# Patient Record
Sex: FEMALE | Race: WHITE | NOT HISPANIC OR LATINO
[De-identification: names, ages, dates, MRNs, and addresses within clinical notes are randomized per-mention and may not be internally consistent; named-entity substitution may affect disease eponyms.]

---

## 2014-12-08 RX ORDER — LOSARTAN POTASSIUM 100 MG/1
1 TABLET, FILM COATED ORAL
Qty: 0 | Refills: 0 | COMMUNITY
Start: 2014-12-08

## 2014-12-08 RX ORDER — ATORVASTATIN CALCIUM 80 MG/1
1 TABLET, FILM COATED ORAL
Qty: 0 | Refills: 0 | COMMUNITY
Start: 2014-12-08

## 2017-01-03 ENCOUNTER — CHART COPY (OUTPATIENT)
Age: 72
End: 2017-01-03

## 2017-02-08 ENCOUNTER — APPOINTMENT (OUTPATIENT)
Dept: PHYSICAL MEDICINE AND REHAB | Facility: CLINIC | Age: 72
End: 2017-02-08

## 2017-02-08 VITALS
TEMPERATURE: 98.4 F | HEART RATE: 95 BPM | SYSTOLIC BLOOD PRESSURE: 170 MMHG | DIASTOLIC BLOOD PRESSURE: 85 MMHG | OXYGEN SATURATION: 98 % | BODY MASS INDEX: 18.89 KG/M2 | HEIGHT: 58 IN | WEIGHT: 90 LBS

## 2017-04-12 ENCOUNTER — APPOINTMENT (OUTPATIENT)
Dept: PHYSICAL MEDICINE AND REHAB | Facility: CLINIC | Age: 72
End: 2017-04-12

## 2017-04-12 VITALS
HEART RATE: 97 BPM | SYSTOLIC BLOOD PRESSURE: 150 MMHG | TEMPERATURE: 98.7 F | OXYGEN SATURATION: 93 % | DIASTOLIC BLOOD PRESSURE: 79 MMHG

## 2017-05-02 ENCOUNTER — APPOINTMENT (OUTPATIENT)
Dept: PHYSICAL MEDICINE AND REHAB | Facility: CLINIC | Age: 72
End: 2017-05-02

## 2017-05-02 VITALS
TEMPERATURE: 98 F | OXYGEN SATURATION: 96 % | SYSTOLIC BLOOD PRESSURE: 154 MMHG | HEART RATE: 99 BPM | DIASTOLIC BLOOD PRESSURE: 81 MMHG

## 2017-05-02 DIAGNOSIS — R47.01 APHASIA: ICD-10-CM

## 2017-05-02 DIAGNOSIS — I63.411 CEREBRAL INFARCTION DUE TO EMBOLISM OF RIGHT MIDDLE CEREBRAL ARTERY: ICD-10-CM

## 2017-05-02 RX ORDER — ALENDRONATE SODIUM 70 MG/1
70 TABLET ORAL
Refills: 0 | Status: ACTIVE | COMMUNITY

## 2017-05-09 ENCOUNTER — RX RENEWAL (OUTPATIENT)
Age: 72
End: 2017-05-09

## 2017-05-09 RX ORDER — BUPROPION HYDROCHLORIDE 150 MG/1
150 TABLET, FILM COATED, EXTENDED RELEASE ORAL DAILY
Qty: 90 | Refills: 0 | Status: ACTIVE | COMMUNITY
Start: 2017-02-08 | End: 1900-01-01

## 2017-05-25 ENCOUNTER — RX RENEWAL (OUTPATIENT)
Age: 72
End: 2017-05-25

## 2017-06-07 ENCOUNTER — APPOINTMENT (OUTPATIENT)
Dept: PHYSICAL MEDICINE AND REHAB | Facility: CLINIC | Age: 72
End: 2017-06-07

## 2017-06-07 VITALS
HEIGHT: 58 IN | HEART RATE: 85 BPM | SYSTOLIC BLOOD PRESSURE: 152 MMHG | DIASTOLIC BLOOD PRESSURE: 91 MMHG | BODY MASS INDEX: 18.26 KG/M2 | TEMPERATURE: 85 F | WEIGHT: 87 LBS | OXYGEN SATURATION: 96 %

## 2017-06-12 ENCOUNTER — CHART COPY (OUTPATIENT)
Age: 72
End: 2017-06-12

## 2017-06-13 ENCOUNTER — CHART COPY (OUTPATIENT)
Age: 72
End: 2017-06-13

## 2017-06-13 ENCOUNTER — FORM ENCOUNTER (OUTPATIENT)
Age: 72
End: 2017-06-13

## 2017-06-13 ENCOUNTER — APPOINTMENT (OUTPATIENT)
Dept: PHYSICAL MEDICINE AND REHAB | Facility: CLINIC | Age: 72
End: 2017-06-13

## 2017-06-13 DIAGNOSIS — M25.532 PAIN IN LEFT WRIST: ICD-10-CM

## 2017-06-13 RX ORDER — CEPHALEXIN 750 MG/1
750 CAPSULE ORAL
Qty: 10 | Refills: 0 | Status: ACTIVE | COMMUNITY
Start: 2017-06-13 | End: 1900-01-01

## 2017-06-14 ENCOUNTER — OUTPATIENT (OUTPATIENT)
Dept: OUTPATIENT SERVICES | Facility: HOSPITAL | Age: 72
LOS: 1 days | End: 2017-06-14
Payer: MEDICARE

## 2017-06-14 ENCOUNTER — CHART COPY (OUTPATIENT)
Age: 72
End: 2017-06-14

## 2017-06-14 ENCOUNTER — APPOINTMENT (OUTPATIENT)
Dept: RADIOLOGY | Facility: CLINIC | Age: 72
End: 2017-06-14

## 2017-06-14 VITALS
HEART RATE: 95 BPM | DIASTOLIC BLOOD PRESSURE: 74 MMHG | SYSTOLIC BLOOD PRESSURE: 135 MMHG | TEMPERATURE: 98.3 F | OXYGEN SATURATION: 94 %

## 2017-06-14 DIAGNOSIS — M25.532 PAIN IN LEFT WRIST: ICD-10-CM

## 2017-06-14 PROCEDURE — 73110 X-RAY EXAM OF WRIST: CPT

## 2017-07-12 ENCOUNTER — APPOINTMENT (OUTPATIENT)
Dept: PHYSICAL MEDICINE AND REHAB | Facility: CLINIC | Age: 72
End: 2017-07-12

## 2017-08-07 ENCOUNTER — RX RENEWAL (OUTPATIENT)
Age: 72
End: 2017-08-07

## 2017-10-11 ENCOUNTER — APPOINTMENT (OUTPATIENT)
Dept: PHYSICAL MEDICINE AND REHAB | Facility: CLINIC | Age: 72
End: 2017-10-11
Payer: MEDICARE

## 2017-10-11 VITALS
SYSTOLIC BLOOD PRESSURE: 184 MMHG | DIASTOLIC BLOOD PRESSURE: 74 MMHG | HEART RATE: 85 BPM | TEMPERATURE: 98 F | OXYGEN SATURATION: 96 %

## 2017-10-11 PROCEDURE — 99213 OFFICE O/P EST LOW 20 MIN: CPT | Mod: GC

## 2017-10-17 ENCOUNTER — RX RENEWAL (OUTPATIENT)
Age: 72
End: 2017-10-17

## 2017-11-07 ENCOUNTER — CHART COPY (OUTPATIENT)
Age: 72
End: 2017-11-07

## 2017-11-13 ENCOUNTER — RX RENEWAL (OUTPATIENT)
Age: 72
End: 2017-11-13

## 2017-11-13 RX ORDER — ONABOTULINUMTOXINA 100 [USP'U]/1
100 INJECTION, POWDER, LYOPHILIZED, FOR SOLUTION INTRADERMAL; INTRAMUSCULAR
Qty: 4 | Refills: 0 | Status: ACTIVE | OUTPATIENT
Start: 2017-11-13

## 2017-11-16 ENCOUNTER — CHART COPY (OUTPATIENT)
Age: 72
End: 2017-11-16

## 2017-11-16 RX ORDER — ONABOTULINUMTOXINA 100 [USP'U]/1
100 INJECTION, POWDER, LYOPHILIZED, FOR SOLUTION INTRADERMAL; INTRAMUSCULAR
Qty: 4 | Refills: 0 | Status: ACTIVE | OUTPATIENT
Start: 2017-11-16

## 2017-12-07 ENCOUNTER — APPOINTMENT (OUTPATIENT)
Dept: PHYSICAL MEDICINE AND REHAB | Facility: CLINIC | Age: 72
End: 2017-12-07
Payer: MEDICARE

## 2017-12-07 VITALS
SYSTOLIC BLOOD PRESSURE: 136 MMHG | TEMPERATURE: 98 F | HEART RATE: 89 BPM | DIASTOLIC BLOOD PRESSURE: 76 MMHG | OXYGEN SATURATION: 96 %

## 2017-12-07 VITALS — WEIGHT: 89 LBS | BODY MASS INDEX: 18.6 KG/M2

## 2017-12-07 PROCEDURE — 64643 CHEMODENERV 1 EXTREM 1-4 EA: CPT

## 2017-12-07 PROCEDURE — 95874 GUIDE NERV DESTR NEEDLE EMG: CPT | Mod: LT

## 2017-12-07 PROCEDURE — 64642 CHEMODENERV 1 EXTREMITY 1-4: CPT

## 2017-12-20 ENCOUNTER — APPOINTMENT (OUTPATIENT)
Dept: PHYSICAL MEDICINE AND REHAB | Facility: CLINIC | Age: 72
End: 2017-12-20

## 2018-01-24 ENCOUNTER — APPOINTMENT (OUTPATIENT)
Dept: PHYSICAL MEDICINE AND REHAB | Facility: CLINIC | Age: 73
End: 2018-01-24
Payer: MEDICARE

## 2018-01-24 VITALS — OXYGEN SATURATION: 93 % | HEART RATE: 105 BPM | TEMPERATURE: 97.9 F

## 2018-01-24 PROCEDURE — 99213 OFFICE O/P EST LOW 20 MIN: CPT | Mod: GC

## 2018-01-24 RX ORDER — ONABOTULINUMTOXINA 100 [USP'U]/1
100 INJECTION, POWDER, LYOPHILIZED, FOR SOLUTION INTRADERMAL; INTRAMUSCULAR
Qty: 4 | Refills: 0 | Status: ACTIVE | OUTPATIENT
Start: 2018-01-24

## 2018-01-30 ENCOUNTER — APPOINTMENT (OUTPATIENT)
Dept: RADIOLOGY | Facility: IMAGING CENTER | Age: 73
End: 2018-01-30
Payer: MEDICARE

## 2018-01-30 ENCOUNTER — OUTPATIENT (OUTPATIENT)
Dept: OUTPATIENT SERVICES | Facility: HOSPITAL | Age: 73
LOS: 1 days | End: 2018-01-30
Payer: MEDICARE

## 2018-01-30 DIAGNOSIS — R05 COUGH: ICD-10-CM

## 2018-01-30 DIAGNOSIS — J47.9 BRONCHIECTASIS, UNCOMPLICATED: ICD-10-CM

## 2018-01-30 PROCEDURE — 71046 X-RAY EXAM CHEST 2 VIEWS: CPT | Mod: 26

## 2018-01-30 PROCEDURE — 71046 X-RAY EXAM CHEST 2 VIEWS: CPT

## 2018-02-05 ENCOUNTER — APPOINTMENT (OUTPATIENT)
Dept: PHYSICAL MEDICINE AND REHAB | Facility: CLINIC | Age: 73
End: 2018-02-05

## 2018-03-27 ENCOUNTER — CHART COPY (OUTPATIENT)
Age: 73
End: 2018-03-27

## 2018-03-27 ENCOUNTER — APPOINTMENT (OUTPATIENT)
Dept: PHYSICAL MEDICINE AND REHAB | Facility: CLINIC | Age: 73
End: 2018-03-27
Payer: MEDICARE

## 2018-03-27 VITALS
SYSTOLIC BLOOD PRESSURE: 154 MMHG | OXYGEN SATURATION: 96 % | TEMPERATURE: 98 F | HEART RATE: 98 BPM | DIASTOLIC BLOOD PRESSURE: 79 MMHG

## 2018-03-27 PROCEDURE — 95874 GUIDE NERV DESTR NEEDLE EMG: CPT | Mod: 59

## 2018-03-27 PROCEDURE — 64642 CHEMODENERV 1 EXTREMITY 1-4: CPT

## 2018-03-27 PROCEDURE — 64643 CHEMODENERV 1 EXTREM 1-4 EA: CPT

## 2018-05-02 ENCOUNTER — APPOINTMENT (OUTPATIENT)
Dept: PHYSICAL MEDICINE AND REHAB | Facility: CLINIC | Age: 73
End: 2018-05-02
Payer: MEDICARE

## 2018-05-02 VITALS — DIASTOLIC BLOOD PRESSURE: 74 MMHG | HEART RATE: 96 BPM | SYSTOLIC BLOOD PRESSURE: 128 MMHG | OXYGEN SATURATION: 94 %

## 2018-05-02 PROCEDURE — 99213 OFFICE O/P EST LOW 20 MIN: CPT

## 2018-05-02 RX ORDER — ONABOTULINUMTOXINA 100 [USP'U]/1
100 INJECTION, POWDER, LYOPHILIZED, FOR SOLUTION INTRADERMAL; INTRAMUSCULAR
Qty: 4 | Refills: 0 | Status: ACTIVE | OUTPATIENT
Start: 2018-05-02

## 2018-06-01 ENCOUNTER — APPOINTMENT (OUTPATIENT)
Dept: RADIOLOGY | Facility: IMAGING CENTER | Age: 73
End: 2018-06-01
Payer: MEDICARE

## 2018-06-01 ENCOUNTER — OUTPATIENT (OUTPATIENT)
Dept: OUTPATIENT SERVICES | Facility: HOSPITAL | Age: 73
LOS: 1 days | End: 2018-06-01
Payer: MEDICARE

## 2018-06-01 DIAGNOSIS — R91.1 SOLITARY PULMONARY NODULE: ICD-10-CM

## 2018-06-01 PROCEDURE — 71046 X-RAY EXAM CHEST 2 VIEWS: CPT | Mod: 26

## 2018-06-01 PROCEDURE — 71046 X-RAY EXAM CHEST 2 VIEWS: CPT

## 2018-06-15 DIAGNOSIS — R91.8 OTHER NONSPECIFIC ABNORMAL FINDING OF LUNG FIELD: ICD-10-CM

## 2018-07-03 ENCOUNTER — APPOINTMENT (OUTPATIENT)
Dept: PHYSICAL MEDICINE AND REHAB | Facility: CLINIC | Age: 73
End: 2018-07-03
Payer: MEDICARE

## 2018-07-03 VITALS
SYSTOLIC BLOOD PRESSURE: 132 MMHG | DIASTOLIC BLOOD PRESSURE: 73 MMHG | TEMPERATURE: 98 F | OXYGEN SATURATION: 95 % | HEART RATE: 82 BPM

## 2018-07-03 VITALS — BODY MASS INDEX: 20.69 KG/M2 | WEIGHT: 99 LBS

## 2018-07-03 PROCEDURE — 95874 GUIDE NERV DESTR NEEDLE EMG: CPT

## 2018-07-03 PROCEDURE — 64643 CHEMODENERV 1 EXTREM 1-4 EA: CPT

## 2018-07-03 PROCEDURE — 64642 CHEMODENERV 1 EXTREMITY 1-4: CPT

## 2018-07-03 RX ORDER — IPRATROPIUM BROMIDE 42 UG/1
0.06 SPRAY NASAL
Qty: 15 | Refills: 0 | Status: ACTIVE | COMMUNITY
Start: 2018-03-25

## 2018-07-03 RX ORDER — FLUTICASONE PROPIONATE AND SALMETEROL XINAFOATE 115; 21 UG/1; UG/1
115-21 AEROSOL, METERED RESPIRATORY (INHALATION)
Qty: 36 | Refills: 0 | Status: ACTIVE | COMMUNITY
Start: 2018-01-31

## 2018-07-03 RX ORDER — TIOTROPIUM BROMIDE INHALATION SPRAY 3.12 UG/1
2.5 SPRAY, METERED RESPIRATORY (INHALATION)
Qty: 4 | Refills: 0 | Status: ACTIVE | COMMUNITY
Start: 2017-12-27

## 2018-07-03 RX ORDER — WARFARIN 1 MG/1
1 TABLET ORAL
Qty: 129 | Refills: 0 | Status: ACTIVE | COMMUNITY
Start: 2017-07-18

## 2018-07-03 RX ORDER — LOSARTAN POTASSIUM 25 MG/1
25 TABLET, FILM COATED ORAL
Qty: 90 | Refills: 0 | Status: ACTIVE | COMMUNITY
Start: 2018-04-18

## 2018-08-22 ENCOUNTER — APPOINTMENT (OUTPATIENT)
Dept: PHYSICAL MEDICINE AND REHAB | Facility: CLINIC | Age: 73
End: 2018-08-22
Payer: MEDICARE

## 2018-08-22 VITALS
SYSTOLIC BLOOD PRESSURE: 153 MMHG | HEART RATE: 111 BPM | OXYGEN SATURATION: 97 % | DIASTOLIC BLOOD PRESSURE: 79 MMHG | TEMPERATURE: 98.1 F

## 2018-08-22 PROCEDURE — 99213 OFFICE O/P EST LOW 20 MIN: CPT

## 2018-08-22 RX ORDER — IPRATROPIUM BROMIDE 21 UG/1
0.03 SPRAY NASAL
Qty: 30 | Refills: 0 | Status: COMPLETED | COMMUNITY
Start: 2018-04-04

## 2018-08-22 RX ORDER — ATORVASTATIN CALCIUM 10 MG/1
10 TABLET, FILM COATED ORAL
Qty: 90 | Refills: 0 | Status: ACTIVE | COMMUNITY
Start: 2018-04-10

## 2018-08-22 RX ORDER — ONABOTULINUMTOXINA 100 [USP'U]/1
100 INJECTION, POWDER, LYOPHILIZED, FOR SOLUTION INTRADERMAL; INTRAMUSCULAR
Qty: 4 | Refills: 0 | Status: ACTIVE | OUTPATIENT
Start: 2018-08-22

## 2018-10-02 ENCOUNTER — APPOINTMENT (OUTPATIENT)
Dept: PHYSICAL MEDICINE AND REHAB | Facility: CLINIC | Age: 73
End: 2018-10-02
Payer: MEDICARE

## 2018-10-02 VITALS — WEIGHT: 98.31 LBS | DIASTOLIC BLOOD PRESSURE: 79 MMHG | BODY MASS INDEX: 20.55 KG/M2 | SYSTOLIC BLOOD PRESSURE: 146 MMHG

## 2018-10-02 PROCEDURE — 99213 OFFICE O/P EST LOW 20 MIN: CPT

## 2018-10-10 ENCOUNTER — INPATIENT (INPATIENT)
Facility: HOSPITAL | Age: 73
LOS: 20 days | Discharge: HOME | DRG: 56 | End: 2018-10-31
Attending: INTERNAL MEDICINE | Admitting: SPECIALIST
Payer: MEDICARE

## 2018-10-10 VITALS
HEART RATE: 116 BPM | SYSTOLIC BLOOD PRESSURE: 169 MMHG | OXYGEN SATURATION: 96 % | RESPIRATION RATE: 18 BRPM | TEMPERATURE: 97 F | HEIGHT: 59 IN | WEIGHT: 95.02 LBS | DIASTOLIC BLOOD PRESSURE: 95 MMHG

## 2018-10-10 DIAGNOSIS — R56.9 UNSPECIFIED CONVULSIONS: ICD-10-CM

## 2018-10-10 LAB
ALBUMIN SERPL ELPH-MCNC: 3.9 G/DL — SIGNIFICANT CHANGE UP (ref 3.3–5)
ALBUMIN SERPL ELPH-MCNC: 4 G/DL — SIGNIFICANT CHANGE UP (ref 3.3–5)
ALP SERPL-CCNC: 106 U/L — SIGNIFICANT CHANGE UP (ref 40–120)
ALP SERPL-CCNC: 116 U/L — SIGNIFICANT CHANGE UP (ref 40–120)
ALT FLD-CCNC: 16 U/L — SIGNIFICANT CHANGE UP (ref 10–45)
ALT FLD-CCNC: 16 U/L — SIGNIFICANT CHANGE UP (ref 10–45)
ANION GAP SERPL CALC-SCNC: 13 MMOL/L — SIGNIFICANT CHANGE UP (ref 5–17)
ANION GAP SERPL CALC-SCNC: 8 MMOL/L — SIGNIFICANT CHANGE UP (ref 5–17)
APPEARANCE UR: CLEAR — SIGNIFICANT CHANGE UP
APTT BLD: 29.9 SEC — SIGNIFICANT CHANGE UP (ref 27.5–37.4)
APTT BLD: 34.5 SEC — SIGNIFICANT CHANGE UP (ref 27.5–37.4)
AST SERPL-CCNC: 21 U/L — SIGNIFICANT CHANGE UP (ref 10–40)
AST SERPL-CCNC: 25 U/L — SIGNIFICANT CHANGE UP (ref 10–40)
BACTERIA # UR AUTO: NEGATIVE — SIGNIFICANT CHANGE UP
BASOPHILS # BLD AUTO: 0 K/UL — SIGNIFICANT CHANGE UP (ref 0–0.2)
BASOPHILS NFR BLD AUTO: 0.5 % — SIGNIFICANT CHANGE UP (ref 0–2)
BILIRUB SERPL-MCNC: 0.4 MG/DL — SIGNIFICANT CHANGE UP (ref 0.2–1.2)
BILIRUB SERPL-MCNC: 0.7 MG/DL — SIGNIFICANT CHANGE UP (ref 0.2–1.2)
BILIRUB UR-MCNC: NEGATIVE — SIGNIFICANT CHANGE UP
BUN SERPL-MCNC: 10 MG/DL — SIGNIFICANT CHANGE UP (ref 7–23)
BUN SERPL-MCNC: 12 MG/DL — SIGNIFICANT CHANGE UP (ref 7–23)
CALCIUM SERPL-MCNC: 8.8 MG/DL — SIGNIFICANT CHANGE UP (ref 8.4–10.5)
CALCIUM SERPL-MCNC: 9.3 MG/DL — SIGNIFICANT CHANGE UP (ref 8.4–10.5)
CHLORIDE SERPL-SCNC: 102 MMOL/L — SIGNIFICANT CHANGE UP (ref 96–108)
CHLORIDE SERPL-SCNC: 105 MMOL/L — SIGNIFICANT CHANGE UP (ref 96–108)
CK MB CFR SERPL CALC: 3.8 NG/ML — SIGNIFICANT CHANGE UP (ref 0–3.8)
CK SERPL-CCNC: 435 U/L — HIGH (ref 25–170)
CO2 SERPL-SCNC: 21 MMOL/L — LOW (ref 22–31)
CO2 SERPL-SCNC: 28 MMOL/L — SIGNIFICANT CHANGE UP (ref 22–31)
COLOR SPEC: COLORLESS — SIGNIFICANT CHANGE UP
CREAT SERPL-MCNC: 0.59 MG/DL — SIGNIFICANT CHANGE UP (ref 0.5–1.3)
CREAT SERPL-MCNC: 0.8 MG/DL — SIGNIFICANT CHANGE UP (ref 0.5–1.3)
DIFF PNL FLD: ABNORMAL
EOSINOPHIL # BLD AUTO: 0.2 K/UL — SIGNIFICANT CHANGE UP (ref 0–0.5)
EOSINOPHIL NFR BLD AUTO: 3.6 % — SIGNIFICANT CHANGE UP (ref 0–6)
EPI CELLS # UR: 0 /HPF — SIGNIFICANT CHANGE UP
ETHANOL SERPL-MCNC: SIGNIFICANT CHANGE UP MG/DL (ref 0–10)
GAS PNL BLDA: SIGNIFICANT CHANGE UP
GLUCOSE BLDC GLUCOMTR-MCNC: 138 MG/DL — HIGH (ref 70–99)
GLUCOSE SERPL-MCNC: 100 MG/DL — HIGH (ref 70–99)
GLUCOSE SERPL-MCNC: 145 MG/DL — HIGH (ref 70–99)
GLUCOSE UR QL: ABNORMAL
HBA1C BLD-MCNC: 5.4 % — SIGNIFICANT CHANGE UP (ref 4–5.6)
HCT VFR BLD CALC: 39.9 % — SIGNIFICANT CHANGE UP (ref 34.5–45)
HCT VFR BLD CALC: 41.6 % — SIGNIFICANT CHANGE UP (ref 34.5–45)
HGB BLD-MCNC: 13.8 G/DL — SIGNIFICANT CHANGE UP (ref 11.5–15.5)
HGB BLD-MCNC: 14.3 G/DL — SIGNIFICANT CHANGE UP (ref 11.5–15.5)
HYALINE CASTS # UR AUTO: 0 /LPF — SIGNIFICANT CHANGE UP (ref 0–2)
INR BLD: 1.11 RATIO — SIGNIFICANT CHANGE UP (ref 0.88–1.16)
INR BLD: 2.63 RATIO — HIGH (ref 0.88–1.16)
KETONES UR-MCNC: NEGATIVE — SIGNIFICANT CHANGE UP
LEUKOCYTE ESTERASE UR-ACNC: NEGATIVE — SIGNIFICANT CHANGE UP
LYMPHOCYTES # BLD AUTO: 1.8 K/UL — SIGNIFICANT CHANGE UP (ref 1–3.3)
LYMPHOCYTES # BLD AUTO: 27.1 % — SIGNIFICANT CHANGE UP (ref 13–44)
MAGNESIUM SERPL-MCNC: 1.7 MG/DL — SIGNIFICANT CHANGE UP (ref 1.6–2.6)
MCHC RBC-ENTMCNC: 32 PG — SIGNIFICANT CHANGE UP (ref 27–34)
MCHC RBC-ENTMCNC: 32.5 PG — SIGNIFICANT CHANGE UP (ref 27–34)
MCHC RBC-ENTMCNC: 34.3 GM/DL — SIGNIFICANT CHANGE UP (ref 32–36)
MCHC RBC-ENTMCNC: 34.5 GM/DL — SIGNIFICANT CHANGE UP (ref 32–36)
MCV RBC AUTO: 93.2 FL — SIGNIFICANT CHANGE UP (ref 80–100)
MCV RBC AUTO: 94.3 FL — SIGNIFICANT CHANGE UP (ref 80–100)
MONOCYTES # BLD AUTO: 0.6 K/UL — SIGNIFICANT CHANGE UP (ref 0–0.9)
MONOCYTES NFR BLD AUTO: 8.8 % — SIGNIFICANT CHANGE UP (ref 2–14)
NEUTROPHILS # BLD AUTO: 4 K/UL — SIGNIFICANT CHANGE UP (ref 1.8–7.4)
NEUTROPHILS NFR BLD AUTO: 59.9 % — SIGNIFICANT CHANGE UP (ref 43–77)
NITRITE UR-MCNC: NEGATIVE — SIGNIFICANT CHANGE UP
PCP SPEC-MCNC: SIGNIFICANT CHANGE UP
PH UR: 7.5 — SIGNIFICANT CHANGE UP (ref 5–8)
PHOSPHATE SERPL-MCNC: 1.5 MG/DL — LOW (ref 2.5–4.5)
PLATELET # BLD AUTO: 267 K/UL — SIGNIFICANT CHANGE UP (ref 150–400)
PLATELET # BLD AUTO: 274 K/UL — SIGNIFICANT CHANGE UP (ref 150–400)
POTASSIUM SERPL-MCNC: 3.7 MMOL/L — SIGNIFICANT CHANGE UP (ref 3.5–5.3)
POTASSIUM SERPL-MCNC: 4 MMOL/L — SIGNIFICANT CHANGE UP (ref 3.5–5.3)
POTASSIUM SERPL-SCNC: 3.7 MMOL/L — SIGNIFICANT CHANGE UP (ref 3.5–5.3)
POTASSIUM SERPL-SCNC: 4 MMOL/L — SIGNIFICANT CHANGE UP (ref 3.5–5.3)
PROT SERPL-MCNC: 7.2 G/DL — SIGNIFICANT CHANGE UP (ref 6–8.3)
PROT SERPL-MCNC: 7.3 G/DL — SIGNIFICANT CHANGE UP (ref 6–8.3)
PROT UR-MCNC: SIGNIFICANT CHANGE UP
PROTHROM AB SERPL-ACNC: 12.1 SEC — SIGNIFICANT CHANGE UP (ref 9.8–12.7)
PROTHROM AB SERPL-ACNC: 29.2 SEC — HIGH (ref 9.8–12.7)
RBC # BLD: 4.23 M/UL — SIGNIFICANT CHANGE UP (ref 3.8–5.2)
RBC # BLD: 4.47 M/UL — SIGNIFICANT CHANGE UP (ref 3.8–5.2)
RBC # FLD: 11.2 % — SIGNIFICANT CHANGE UP (ref 10.3–14.5)
RBC # FLD: 11.3 % — SIGNIFICANT CHANGE UP (ref 10.3–14.5)
RBC CASTS # UR COMP ASSIST: 2 /HPF — SIGNIFICANT CHANGE UP (ref 0–4)
SALICYLATES SERPL-MCNC: <2 MG/DL — LOW (ref 15–30)
SODIUM SERPL-SCNC: 136 MMOL/L — SIGNIFICANT CHANGE UP (ref 135–145)
SODIUM SERPL-SCNC: 141 MMOL/L — SIGNIFICANT CHANGE UP (ref 135–145)
SP GR SPEC: 1.02 — SIGNIFICANT CHANGE UP (ref 1.01–1.02)
TROPONIN T, HIGH SENSITIVITY RESULT: 16 NG/L — SIGNIFICANT CHANGE UP (ref 0–51)
UROBILINOGEN FLD QL: NEGATIVE — SIGNIFICANT CHANGE UP
WBC # BLD: 18.6 K/UL — HIGH (ref 3.8–10.5)
WBC # BLD: 6.6 K/UL — SIGNIFICANT CHANGE UP (ref 3.8–10.5)
WBC # FLD AUTO: 18.6 K/UL — HIGH (ref 3.8–10.5)
WBC # FLD AUTO: 6.6 K/UL — SIGNIFICANT CHANGE UP (ref 3.8–10.5)
WBC UR QL: 0 /HPF — SIGNIFICANT CHANGE UP (ref 0–5)

## 2018-10-10 PROCEDURE — 99291 CRITICAL CARE FIRST HOUR: CPT

## 2018-10-10 PROCEDURE — 72125 CT NECK SPINE W/O DYE: CPT | Mod: 26

## 2018-10-10 PROCEDURE — 93010 ELECTROCARDIOGRAM REPORT: CPT

## 2018-10-10 PROCEDURE — 71260 CT THORAX DX C+: CPT | Mod: 26

## 2018-10-10 PROCEDURE — 31500 INSERT EMERGENCY AIRWAY: CPT

## 2018-10-10 PROCEDURE — 70486 CT MAXILLOFACIAL W/O DYE: CPT | Mod: 26

## 2018-10-10 PROCEDURE — 70450 CT HEAD/BRAIN W/O DYE: CPT | Mod: 26

## 2018-10-10 PROCEDURE — 71045 X-RAY EXAM CHEST 1 VIEW: CPT | Mod: 26

## 2018-10-10 PROCEDURE — 95951: CPT | Mod: 26

## 2018-10-10 PROCEDURE — 95819 EEG AWAKE AND ASLEEP: CPT | Mod: 26

## 2018-10-10 PROCEDURE — 74177 CT ABD & PELVIS W/CONTRAST: CPT | Mod: 26

## 2018-10-10 PROCEDURE — 99291 CRITICAL CARE FIRST HOUR: CPT | Mod: 25

## 2018-10-10 RX ORDER — MONTELUKAST 4 MG/1
1 TABLET, CHEWABLE ORAL
Qty: 0 | Refills: 0 | COMMUNITY

## 2018-10-10 RX ORDER — BUPROPION HYDROCHLORIDE 150 MG/1
150 TABLET, EXTENDED RELEASE ORAL
Qty: 0 | Refills: 0 | Status: DISCONTINUED | OUTPATIENT
Start: 2018-10-10 | End: 2018-10-11

## 2018-10-10 RX ORDER — PROPOFOL 10 MG/ML
10 INJECTION, EMULSION INTRAVENOUS
Qty: 500 | Refills: 0 | Status: DISCONTINUED | OUTPATIENT
Start: 2018-10-10 | End: 2018-10-11

## 2018-10-10 RX ORDER — SODIUM CHLORIDE 9 MG/ML
1000 INJECTION INTRAMUSCULAR; INTRAVENOUS; SUBCUTANEOUS
Qty: 0 | Refills: 0 | Status: DISCONTINUED | OUTPATIENT
Start: 2018-10-10 | End: 2018-10-11

## 2018-10-10 RX ORDER — FENTANYL CITRATE 50 UG/ML
50 INJECTION INTRAVENOUS ONCE
Qty: 0 | Refills: 0 | Status: DISCONTINUED | OUTPATIENT
Start: 2018-10-10 | End: 2018-10-10

## 2018-10-10 RX ORDER — ETOMIDATE 2 MG/ML
20 INJECTION INTRAVENOUS ONCE
Qty: 0 | Refills: 0 | Status: COMPLETED | OUTPATIENT
Start: 2018-10-10 | End: 2018-10-10

## 2018-10-10 RX ORDER — DEXTROSE 50 % IN WATER 50 %
25 SYRINGE (ML) INTRAVENOUS ONCE
Qty: 0 | Refills: 0 | Status: DISCONTINUED | OUTPATIENT
Start: 2018-10-10 | End: 2018-10-31

## 2018-10-10 RX ORDER — PHYTONADIONE (VIT K1) 5 MG
10 TABLET ORAL ONCE
Qty: 0 | Refills: 0 | Status: DISCONTINUED | OUTPATIENT
Start: 2018-10-10 | End: 2018-10-10

## 2018-10-10 RX ORDER — MAGNESIUM SULFATE 500 MG/ML
1 VIAL (ML) INJECTION ONCE
Qty: 0 | Refills: 0 | Status: COMPLETED | OUTPATIENT
Start: 2018-10-10 | End: 2018-10-10

## 2018-10-10 RX ORDER — PHENYLEPHRINE HYDROCHLORIDE 10 MG/ML
0.4 INJECTION INTRAVENOUS
Qty: 160 | Refills: 0 | Status: DISCONTINUED | OUTPATIENT
Start: 2018-10-10 | End: 2018-10-12

## 2018-10-10 RX ORDER — DEXMEDETOMIDINE HYDROCHLORIDE IN 0.9% SODIUM CHLORIDE 4 UG/ML
0.2 INJECTION INTRAVENOUS
Qty: 200 | Refills: 0 | Status: DISCONTINUED | OUTPATIENT
Start: 2018-10-10 | End: 2018-10-12

## 2018-10-10 RX ORDER — SODIUM CHLORIDE 9 MG/ML
1000 INJECTION, SOLUTION INTRAVENOUS
Qty: 0 | Refills: 0 | Status: DISCONTINUED | OUTPATIENT
Start: 2018-10-10 | End: 2018-10-11

## 2018-10-10 RX ORDER — LEVETIRACETAM 250 MG/1
1000 TABLET, FILM COATED ORAL EVERY 12 HOURS
Qty: 0 | Refills: 0 | Status: DISCONTINUED | OUTPATIENT
Start: 2018-10-10 | End: 2018-10-10

## 2018-10-10 RX ORDER — POTASSIUM PHOSPHATE, MONOBASIC POTASSIUM PHOSPHATE, DIBASIC 236; 224 MG/ML; MG/ML
30 INJECTION, SOLUTION INTRAVENOUS ONCE
Qty: 0 | Refills: 0 | Status: COMPLETED | OUTPATIENT
Start: 2018-10-10 | End: 2018-10-10

## 2018-10-10 RX ORDER — INSULIN LISPRO 100/ML
VIAL (ML) SUBCUTANEOUS EVERY 6 HOURS
Qty: 0 | Refills: 0 | Status: DISCONTINUED | OUTPATIENT
Start: 2018-10-10 | End: 2018-10-12

## 2018-10-10 RX ORDER — ACETAMINOPHEN 500 MG
1000 TABLET ORAL ONCE
Qty: 0 | Refills: 0 | Status: DISCONTINUED | OUTPATIENT
Start: 2018-10-10 | End: 2018-10-10

## 2018-10-10 RX ORDER — DEXTROSE 50 % IN WATER 50 %
12.5 SYRINGE (ML) INTRAVENOUS ONCE
Qty: 0 | Refills: 0 | Status: DISCONTINUED | OUTPATIENT
Start: 2018-10-10 | End: 2018-10-31

## 2018-10-10 RX ORDER — MAGNESIUM SULFATE 500 MG/ML
2 VIAL (ML) INJECTION ONCE
Qty: 0 | Refills: 0 | Status: COMPLETED | OUTPATIENT
Start: 2018-10-10 | End: 2018-10-10

## 2018-10-10 RX ORDER — ROCURONIUM BROMIDE 10 MG/ML
100 VIAL (ML) INTRAVENOUS ONCE
Qty: 0 | Refills: 0 | Status: DISCONTINUED | OUTPATIENT
Start: 2018-10-10 | End: 2018-10-10

## 2018-10-10 RX ORDER — PROPOFOL 10 MG/ML
10 INJECTION, EMULSION INTRAVENOUS
Qty: 500 | Refills: 0 | Status: DISCONTINUED | OUTPATIENT
Start: 2018-10-10 | End: 2018-10-10

## 2018-10-10 RX ORDER — DEXTROSE 50 % IN WATER 50 %
15 SYRINGE (ML) INTRAVENOUS ONCE
Qty: 0 | Refills: 0 | Status: DISCONTINUED | OUTPATIENT
Start: 2018-10-10 | End: 2018-10-31

## 2018-10-10 RX ORDER — GLUCAGON INJECTION, SOLUTION 0.5 MG/.1ML
1 INJECTION, SOLUTION SUBCUTANEOUS ONCE
Qty: 0 | Refills: 0 | Status: DISCONTINUED | OUTPATIENT
Start: 2018-10-10 | End: 2018-10-31

## 2018-10-10 RX ORDER — ALENDRONATE SODIUM 70 MG/1
1 TABLET ORAL
Qty: 0 | Refills: 0 | COMMUNITY

## 2018-10-10 RX ORDER — NICARDIPINE HYDROCHLORIDE 30 MG/1
5 CAPSULE, EXTENDED RELEASE ORAL
Qty: 40 | Refills: 0 | Status: DISCONTINUED | OUTPATIENT
Start: 2018-10-10 | End: 2018-10-10

## 2018-10-10 RX ORDER — PROTHROMBIN COMPLEX CONCENTRATE (HUMAN) 25.5; 16.5; 24; 22; 22; 26 [IU]/ML; [IU]/ML; [IU]/ML; [IU]/ML; [IU]/ML; [IU]/ML
1500 POWDER, FOR SOLUTION INTRAVENOUS ONCE
Qty: 0 | Refills: 0 | Status: DISCONTINUED | OUTPATIENT
Start: 2018-10-10 | End: 2018-10-10

## 2018-10-10 RX ORDER — LEVETIRACETAM 250 MG/1
500 TABLET, FILM COATED ORAL EVERY 12 HOURS
Qty: 0 | Refills: 0 | Status: DISCONTINUED | OUTPATIENT
Start: 2018-10-10 | End: 2018-10-15

## 2018-10-10 RX ORDER — BUPROPION HYDROCHLORIDE 150 MG/1
1 TABLET, EXTENDED RELEASE ORAL
Qty: 0 | Refills: 0 | COMMUNITY

## 2018-10-10 RX ORDER — IPRATROPIUM/ALBUTEROL SULFATE 18-103MCG
3 AEROSOL WITH ADAPTER (GRAM) INHALATION EVERY 6 HOURS
Qty: 0 | Refills: 0 | Status: DISCONTINUED | OUTPATIENT
Start: 2018-10-10 | End: 2018-10-13

## 2018-10-10 RX ORDER — SODIUM CHLORIDE 9 MG/ML
1000 INJECTION, SOLUTION INTRAVENOUS
Qty: 0 | Refills: 0 | Status: DISCONTINUED | OUTPATIENT
Start: 2018-10-10 | End: 2018-10-31

## 2018-10-10 RX ADMIN — SODIUM CHLORIDE 75 MILLILITER(S): 9 INJECTION, SOLUTION INTRAVENOUS at 22:19

## 2018-10-10 RX ADMIN — Medication 50 GRAM(S): at 17:46

## 2018-10-10 RX ADMIN — FENTANYL CITRATE 50 MICROGRAM(S): 50 INJECTION INTRAVENOUS at 15:08

## 2018-10-10 RX ADMIN — SODIUM CHLORIDE 75 MILLILITER(S): 9 INJECTION INTRAMUSCULAR; INTRAVENOUS; SUBCUTANEOUS at 17:07

## 2018-10-10 RX ADMIN — PROPOFOL 2.59 MICROGRAM(S)/KG/MIN: 10 INJECTION, EMULSION INTRAVENOUS at 16:03

## 2018-10-10 RX ADMIN — NICARDIPINE HYDROCHLORIDE 25 MG/HR: 30 CAPSULE, EXTENDED RELEASE ORAL at 17:06

## 2018-10-10 RX ADMIN — POTASSIUM PHOSPHATE, MONOBASIC POTASSIUM PHOSPHATE, DIBASIC 83.33 MILLIMOLE(S): 236; 224 INJECTION, SOLUTION INTRAVENOUS at 21:06

## 2018-10-10 RX ADMIN — FENTANYL CITRATE 50 MICROGRAM(S): 50 INJECTION INTRAVENOUS at 15:00

## 2018-10-10 RX ADMIN — LEVETIRACETAM 400 MILLIGRAM(S): 250 TABLET, FILM COATED ORAL at 16:03

## 2018-10-10 RX ADMIN — ETOMIDATE 20 MILLIGRAM(S): 2 INJECTION INTRAVENOUS at 14:53

## 2018-10-10 RX ADMIN — PHENYLEPHRINE HYDROCHLORIDE 3.21 MICROGRAM(S)/KG/MIN: 10 INJECTION INTRAVENOUS at 21:18

## 2018-10-10 RX ADMIN — DEXMEDETOMIDINE HYDROCHLORIDE IN 0.9% SODIUM CHLORIDE 2.14 MICROGRAM(S)/KG/HR: 4 INJECTION INTRAVENOUS at 21:18

## 2018-10-10 RX ADMIN — Medication 2 MILLIGRAM(S): at 19:40

## 2018-10-10 RX ADMIN — PROPOFOL 2.59 MICROGRAM(S)/KG/MIN: 10 INJECTION, EMULSION INTRAVENOUS at 17:07

## 2018-10-10 NOTE — ED ADULT NURSE NOTE - OBJECTIVE STATEMENT
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 Pt has bruising to the left eye and was found down on her left side. + a/c. Pt denies any CP or SOB no N/V/D no cough fever or chills. Pts  and aide at the bedside.

## 2018-10-10 NOTE — CHART NOTE - NSCHARTNOTEFT_GEN_A_CORE
MICU Accept Note    CHIEF COMPLAINT:     HPI / INTERVAL HISTORY:  73t old female PMHx of Afib on coumadin, left spastic hemiparesis presented after a fall, unwitnessed.  Patient was reportedly found by family on the ground.  She was reportedly shaking and then confused, but then returned to baseline per her family. She was transported to the ED. Initial CT head / c-spine which were negative. Approximately three hours later, the patient seized again and was intubated for airway protection and admitted to the SICU.    Patient's vitals were notable for tachycardia and hypertension to the SBP 230s started on Nicardipine ggt. Labs significant for leukocytosis to the 18s with normal lactate of 1.2. Patient started on Keppra mitch neurology. MICU consulted for possible seizures     PAST MEDICAL & SURGICAL HISTORY:  DVT (deep venous thrombosis)  HLD (hyperlipidemia)  COPD (chronic obstructive pulmonary disease)  No significant past surgical history      FAMILY HISTORY:  No pertinent family history      SOCIAL HISTORY:  Smoking: [  ] Never Smoked  [  ] Former Smoker (# packs x # years)  [  ] Current Smoker (# packs x # years)  Substance Use:   EtOH Use:   Marital Status: [  ] Single  [  ]   [  ]   [  ]   Sexual History:   Occupation:  Recent Travel:  Country of Birth:   Advance Directives:     HOME MEDICATIONS:      Allergies    No Known Allergies    Intolerances          REVIEW OF SYSTEMS:  [X ] Unable to assess ROS because intubated and sedated     OBJECTIVE:  ICU Vital Signs Last 24 Hrs  T(C): 36.3 (10 Oct 2018 15:30), Max: 36.9 (10 Oct 2018 12:45)  T(F): 97.3 (10 Oct 2018 15:30), Max: 98.5 (10 Oct 2018 12:45)  HR: 123 (10 Oct 2018 17:30) (78 - 140)  BP: 150/71 (10 Oct 2018 17:30) (143/77 - 226/123)  BP(mean): 101 (10 Oct 2018 17:30) (101 - 162)  ABP: --  ABP(mean): --  RR: 14 (10 Oct 2018 17:30) (14 - 18)  SpO2: 100% (10 Oct 2018 17:30) (95% - 100%)    Mode: AC/ CMV (Assist Control/ Continuous Mandatory Ventilation), RR (machine): 14, TV (machine): 450, FiO2: 100, PEEP: 5, ITime: 1, MAP: 8, PIP: 19    10-10 @ 07:01  -  10-10 @ 17:49  --------------------------------------------------------  IN: 300.6 mL / OUT: 1000 mL / NET: -699.4 mL      CAPILLARY BLOOD GLUCOSE      POCT Blood Glucose.: 100 mg/dL (10 Oct 2018 14:28)      PHYSICAL EXAM:  GENERAL: intubated and sedated.  HEAD:  hematoma over left orbit  EYES: EOMI, PERRLA, conjunctiva and sclera clear  NECK: Supple, No JVD  CHEST/LUNG: Clear to auscultation bilaterally; No wheeze  HEART: Regular rate and rhythm; No murmurs, rubs, or gallops  ABDOMEN: Soft, Nontender, Nondistended; Bowel sounds present  EXTREMITIES:  2+ Peripheral Pulses, No clubbing, cyanosis, or edema  PSYCH: AAOx3  NEUROLOGY: intubated and sedated. left pupil constricted and nonreactive   SKIN: No rashes or lesions      LINES:     HOSPITAL MEDICATIONS:  MEDICATIONS  (STANDING):  acetaminophen  IVPB .. 1000 milliGRAM(s) IV Intermittent once  dextrose 5%. 1000 milliLiter(s) (50 mL/Hr) IV Continuous <Continuous>  dextrose 50% Injectable 12.5 Gram(s) IV Push once  dextrose 50% Injectable 25 Gram(s) IV Push once  dextrose 50% Injectable 25 Gram(s) IV Push once  insulin lispro (HumaLOG) corrective regimen sliding scale   SubCutaneous every 6 hours  levETIRAcetam  IVPB 500 milliGRAM(s) IV Intermittent every 12 hours  multivitamin/thiamine/folic acid in sodium chloride 0.9% 1000 milliLiter(s) (75 mL/Hr) IV Continuous <Continuous>  niCARdipine Infusion 5 mG/Hr (25 mL/Hr) IV Continuous <Continuous>  phytonadione  IVPB 10 milliGRAM(s) IV Intermittent once  potassium phosphate IVPB 30 milliMole(s) IV Intermittent once  propofol Infusion 10 MICROgram(s)/kG/Min (2.586 mL/Hr) IV Continuous <Continuous>  prothrombin complex concentrate IVPB (KCENTRA) 1500 International Unit(s) IV Intermittent once  sodium chloride 0.9%. 1000 milliLiter(s) (75 mL/Hr) IV Continuous <Continuous>    MEDICATIONS  (PRN):  dextrose 40% Gel 15 Gram(s) Oral once PRN Blood Glucose LESS THAN 70 milliGRAM(s)/deciLiter  glucagon  Injectable 1 milliGRAM(s) IntraMuscular once PRN Glucose <70 milliGRAM(s)/deciLiter      LABS:                        14.3   18.6  )-----------( 267      ( 10 Oct 2018 16:27 )             41.6     Hgb Trend: 14.3<--, 13.8<--  10-10    136  |  102  |  10  ----------------------------<  145<H>  3.7   |  21<L>  |  0.59    Ca    8.8      10 Oct 2018 16:27  Phos  1.5     10-10  Mg     1.7     10-10    TPro  7.2  /  Alb  4.0  /  TBili  0.7  /  DBili  x   /  AST  25  /  ALT  16  /  AlkPhos  116  10-10    Creatinine Trend: 0.59<--, 0.80<--  PT/INR - ( 10 Oct 2018 16:27 )   PT: 12.1 sec;   INR: 1.11 ratio         PTT - ( 10 Oct 2018 16:27 )  PTT:29.9 sec  Urinalysis Basic - ( 10 Oct 2018 16:27 )    Color: Colorless / Appearance: Clear / S.020 / pH: x  Gluc: x / Ketone: Negative  / Bili: Negative / Urobili: Negative   Blood: x / Protein: Trace / Nitrite: Negative   Leuk Esterase: Negative / RBC: 2 /hpf / WBC 0 /hpf   Sq Epi: x / Non Sq Epi: 0 /hpf / Bacteria: Negative      Arterial Blood Gas:  10-10 @ 16:28  7.42/38/212/24/100/.6  ABG lactate: --        MICROBIOLOGY:     RADIOLOGY & ADDITIONAL TESTS:        A/P:  73F Hx Afib on coumadin, CVA with residual right weakness presented after unwitnessed fall and seizure activity s/p intubation.     Neuro:  Seizure:  - CT head x 2 did not show any acute changes  - started on Keppra for seizure per neurology, rene recs  - EEG ordered  - Will check urine and blood tox, alcohol and salicylate level  - Propofol for sedation  - Tylenol IV     CV:  HTN:  - Patient currently hypertensive and has history of hypertension, SBP was 230s  - on Nicardipine ggt, with BP goal of 180/100 for now  - Will order TSH level and lipid profile  - trend trop    Respiratory:  - Intubated for airway protection in active seizure  - Continue mechanical ventilation  - Will trend ABG's    GI:  - NPO  - Will place OG tube  - Will start protonix after 48 hours  - CT abd was benign for acute trauma    Renal:  - Stict I's O's  - C/w soria  - Electrolyte WNL, will replete prn    Heme:  - Patient received K-centra and Vitamin K  - INR 2.5 -> 1.1    ID:  - Leukocytosis to 18.6, lactate 1.2  - BCx sent    Endo:  - uses insulin at home  - start SSI with q6hr finger sticks  - check A1C MICU Accept Note    CHIEF COMPLAINT:     HPI / INTERVAL HISTORY:  73t old female PMHx of Afib on coumadin, left spastic hemiparesis presented after a fall, unwitnessed.  Patient was reportedly found by family on the ground.  She was reportedly shaking and then confused, but then returned to baseline per her family. She was transported to the ED. Initial CT head / c-spine which were negative. Approximately three hours later, the patient seized again and was intubated for airway protection and admitted to the SICU.    Patient's vitals were notable for tachycardia and hypertension to the SBP 230s started on Nicardipine ggt. Labs significant for leukocytosis to the 18s with normal lactate of 1.2. Patient started on Keppra mitch neurology. MICU consulted for possible seizures     PAST MEDICAL & SURGICAL HISTORY:  DVT (deep venous thrombosis)  HLD (hyperlipidemia)  COPD (chronic obstructive pulmonary disease)  No significant past surgical history      FAMILY HISTORY:  No pertinent family history      SOCIAL HISTORY:  Smoking: [  ] Never Smoked  [  ] Former Smoker (# packs x # years)  [  ] Current Smoker (# packs x # years)  Substance Use:   EtOH Use:   Marital Status: [  ] Single  [  ]   [  ]   [  ]   Sexual History:   Occupation:  Recent Travel:  Country of Birth:   Advance Directives:     HOME MEDICATIONS:      Allergies    No Known Allergies    Intolerances          REVIEW OF SYSTEMS:  [X ] Unable to assess ROS because intubated and sedated     OBJECTIVE:  ICU Vital Signs Last 24 Hrs  T(C): 36.3 (10 Oct 2018 15:30), Max: 36.9 (10 Oct 2018 12:45)  T(F): 97.3 (10 Oct 2018 15:30), Max: 98.5 (10 Oct 2018 12:45)  HR: 123 (10 Oct 2018 17:30) (78 - 140)  BP: 150/71 (10 Oct 2018 17:30) (143/77 - 226/123)  BP(mean): 101 (10 Oct 2018 17:30) (101 - 162)  ABP: --  ABP(mean): --  RR: 14 (10 Oct 2018 17:30) (14 - 18)  SpO2: 100% (10 Oct 2018 17:30) (95% - 100%)    Mode: AC/ CMV (Assist Control/ Continuous Mandatory Ventilation), RR (machine): 14, TV (machine): 450, FiO2: 100, PEEP: 5, ITime: 1, MAP: 8, PIP: 19    10-10 @ 07:01  -  10-10 @ 17:49  --------------------------------------------------------  IN: 300.6 mL / OUT: 1000 mL / NET: -699.4 mL      CAPILLARY BLOOD GLUCOSE      POCT Blood Glucose.: 100 mg/dL (10 Oct 2018 14:28)      PHYSICAL EXAM:  GENERAL: intubated and sedated.  HEAD:  hematoma over left orbit  EYES: EOMI, PERRLA, conjunctiva and sclera clear  NECK: Supple, No JVD  CHEST/LUNG: Clear to auscultation bilaterally; No wheeze  HEART: Tachycardic. Irregularly irregular rhythm; No murmurs, rubs, or gallops  ABDOMEN: Soft, Nontender, Nondistended; Bowel sounds present  EXTREMITIES:  2+ Peripheral Pulses, No clubbing, cyanosis, or edema  NEUROLOGY: intubated and sedated. left pupil constricted and nonreactive   SKIN: No rashes or lesions      LINES:     HOSPITAL MEDICATIONS:  MEDICATIONS  (STANDING):  acetaminophen  IVPB .. 1000 milliGRAM(s) IV Intermittent once  dextrose 5%. 1000 milliLiter(s) (50 mL/Hr) IV Continuous <Continuous>  dextrose 50% Injectable 12.5 Gram(s) IV Push once  dextrose 50% Injectable 25 Gram(s) IV Push once  dextrose 50% Injectable 25 Gram(s) IV Push once  insulin lispro (HumaLOG) corrective regimen sliding scale   SubCutaneous every 6 hours  levETIRAcetam  IVPB 500 milliGRAM(s) IV Intermittent every 12 hours  multivitamin/thiamine/folic acid in sodium chloride 0.9% 1000 milliLiter(s) (75 mL/Hr) IV Continuous <Continuous>  niCARdipine Infusion 5 mG/Hr (25 mL/Hr) IV Continuous <Continuous>  phytonadione  IVPB 10 milliGRAM(s) IV Intermittent once  potassium phosphate IVPB 30 milliMole(s) IV Intermittent once  propofol Infusion 10 MICROgram(s)/kG/Min (2.586 mL/Hr) IV Continuous <Continuous>  prothrombin complex concentrate IVPB (KCENTRA) 1500 International Unit(s) IV Intermittent once  sodium chloride 0.9%. 1000 milliLiter(s) (75 mL/Hr) IV Continuous <Continuous>    MEDICATIONS  (PRN):  dextrose 40% Gel 15 Gram(s) Oral once PRN Blood Glucose LESS THAN 70 milliGRAM(s)/deciLiter  glucagon  Injectable 1 milliGRAM(s) IntraMuscular once PRN Glucose <70 milliGRAM(s)/deciLiter      LABS:                        14.3   18.6  )-----------( 267      ( 10 Oct 2018 16:27 )             41.6     Hgb Trend: 14.3<--, 13.8<--  10-10    136  |  102  |  10  ----------------------------<  145<H>  3.7   |  21<L>  |  0.59    Ca    8.8      10 Oct 2018 16:27  Phos  1.5     10-10  Mg     1.7     10-10    TPro  7.2  /  Alb  4.0  /  TBili  0.7  /  DBili  x   /  AST  25  /  ALT  16  /  AlkPhos  116  10-10    Creatinine Trend: 0.59<--, 0.80<--  PT/INR - ( 10 Oct 2018 16:27 )   PT: 12.1 sec;   INR: 1.11 ratio         PTT - ( 10 Oct 2018 16:27 )  PTT:29.9 sec  Urinalysis Basic - ( 10 Oct 2018 16:27 )    Color: Colorless / Appearance: Clear / S.020 / pH: x  Gluc: x / Ketone: Negative  / Bili: Negative / Urobili: Negative   Blood: x / Protein: Trace / Nitrite: Negative   Leuk Esterase: Negative / RBC: 2 /hpf / WBC 0 /hpf   Sq Epi: x / Non Sq Epi: 0 /hpf / Bacteria: Negative      Arterial Blood Gas:  10-10 @ 16:28  7.42/38/212/24/100/.6  ABG lactate: --        MICROBIOLOGY:     RADIOLOGY & ADDITIONAL TESTS:      < from: CT Abdomen and Pelvis w/ IV Cont (10.10.18 @ 16:25) >    IMPRESSION:   No acute traumatic injury.     Multiple pulmonary nodules measuring up to 0.4 cm, for which follow-up CT   is recommended in 3-6 months. If stable, consider CT at 2 years and 4   years.      < end of copied text >      < from: CT Head No Cont (10.10.18 @ 16:24) >    IMPRESSION: Age-appropriate involutional and ischemic gliotic changes.   Old right basal ganglia ischemic changes unchanged from 12:51 PM. No   hemorrhage.    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     #Neuro  Seizure  - patient with generalized tremors and subsequent confusion prior to presentation  - CT head x 2 did not show any acute changes  - started on Keppra for seizure per neurology  - vEEG ordered  - Will check urine and blood tox, alcohol and salicylate level  - will obtain MRI brain w/o con, and MRA head w/o cont, and MRA neck with con    #Resp  - Intubated for airway protection in active seizure  - Continue mechanical ventilation  - Will obtain ABG  COPD  - will start     #CV  HTN  - Patient with history of hypertension, currently SBP was 230s  - started on Nicardipine ggt. will hold for now   - BP goal of 180/100   - Will obtain CE, TSH level, and lipid profile  Atrial Fibrillation  - [id number] of 6  - Coumadin reversed in the ED with K-centra and VitK in the setting of possible trauma  - will hold Heparin gtt for now, and start in the AM  - patient currently not rate controlled. Will start Dilt PRN for HR >120  - will obtain TTE    #GI  - CT abd was benign for acute trauma  - will start tube feeds     #Renal  - stable  - c/w soria  - monitor electrolytes    #Heme/Onc  - stable    #Endo  DM  - on home insulin    - will start low dose ISS with q6hr finger sticks  - check A1C    #ID  Leukocytosis  - Leukocytosis to 18.6  - CXR negative for consolidations, UA negative  - likely reactive  - will check blood cx     #DVT ppx  - previously anticoagulated with Warfarin MICU Accept Note    CHIEF COMPLAINT:     HPI / INTERVAL HISTORY:  Patient is 73 year old female PMHx of Afib on coumadin, left spastic hemiparesis presented after a fall, unwitnessed.  Patient was reportedly found by family on the ground.  She was reportedly shaking and then confused, but then returned to baseline per her family. She was transported to the ED. Initial CT head / c-spine which were negative. Approximately three hours later, the patient seized again and was intubated for airway protection and admitted to the SICU.    Patient's vitals were notable for tachycardia and hypertension to the SBP 230s started on Nicardipine ggt. Labs significant for leukocytosis to the 18s with normal lactate of 1.2. Patient started on Keppra mitch neurology. MICU consulted for possible seizures     PAST MEDICAL & SURGICAL HISTORY:  DVT (deep venous thrombosis)  HLD (hyperlipidemia)  COPD (chronic obstructive pulmonary disease)  No significant past surgical history      FAMILY HISTORY:  No pertinent family history      SOCIAL HISTORY:  Smoking: [  ] Never Smoked  [ X ] Former Smoker (1 packs x 40 years)    Substance Use:   EtOH Use: History of EtOH abuse. Quit 25 years   Marital Status: [  ] Single  [  ]   [  ]   [  ]   Sexual History:   Occupation:  Recent Travel:  Country of Birth:   Advance Directives:     HOME MEDICATIONS:      Allergies    No Known Allergies    Intolerances          REVIEW OF SYSTEMS:  [X ] Unable to assess ROS because intubated and sedated     OBJECTIVE:  ICU Vital Signs Last 24 Hrs  T(C): 36.3 (10 Oct 2018 15:30), Max: 36.9 (10 Oct 2018 12:45)  T(F): 97.3 (10 Oct 2018 15:30), Max: 98.5 (10 Oct 2018 12:45)  HR: 123 (10 Oct 2018 17:30) (78 - 140)  BP: 150/71 (10 Oct 2018 17:30) (143/77 - 226/123)  BP(mean): 101 (10 Oct 2018 17:30) (101 - 162)  ABP: --  ABP(mean): --  RR: 14 (10 Oct 2018 17:30) (14 - 18)  SpO2: 100% (10 Oct 2018 17:30) (95% - 100%)    Mode: AC/ CMV (Assist Control/ Continuous Mandatory Ventilation), RR (machine): 14, TV (machine): 450, FiO2: 100, PEEP: 5, ITime: 1, MAP: 8, PIP: 19    10-10 @ 07:01  -  10-10 @ 17:49  --------------------------------------------------------  IN: 300.6 mL / OUT: 1000 mL / NET: -699.4 mL      CAPILLARY BLOOD GLUCOSE      POCT Blood Glucose.: 100 mg/dL (10 Oct 2018 14:28)      PHYSICAL EXAM:  GENERAL: intubated and sedated.  HEAD:  hematoma over left orbit  EYES: EOMI, PERRLA, conjunctiva and sclera clear  NECK: Supple, No JVD  CHEST/LUNG: Clear to auscultation bilaterally; No wheeze  HEART: Tachycardic. Irregularly irregular rhythm; No murmurs, rubs, or gallops  ABDOMEN: Soft, Nontender, Nondistended; Bowel sounds present  EXTREMITIES:  2+ Peripheral Pulses, No clubbing, cyanosis, or edema  NEUROLOGY: intubated and sedated. left pupil constricted and nonreactive   SKIN: No rashes or lesions      LINES:     HOSPITAL MEDICATIONS:  MEDICATIONS  (STANDING):  acetaminophen  IVPB .. 1000 milliGRAM(s) IV Intermittent once  dextrose 5%. 1000 milliLiter(s) (50 mL/Hr) IV Continuous <Continuous>  dextrose 50% Injectable 12.5 Gram(s) IV Push once  dextrose 50% Injectable 25 Gram(s) IV Push once  dextrose 50% Injectable 25 Gram(s) IV Push once  insulin lispro (HumaLOG) corrective regimen sliding scale   SubCutaneous every 6 hours  levETIRAcetam  IVPB 500 milliGRAM(s) IV Intermittent every 12 hours  multivitamin/thiamine/folic acid in sodium chloride 0.9% 1000 milliLiter(s) (75 mL/Hr) IV Continuous <Continuous>  niCARdipine Infusion 5 mG/Hr (25 mL/Hr) IV Continuous <Continuous>  phytonadione  IVPB 10 milliGRAM(s) IV Intermittent once  potassium phosphate IVPB 30 milliMole(s) IV Intermittent once  propofol Infusion 10 MICROgram(s)/kG/Min (2.586 mL/Hr) IV Continuous <Continuous>  prothrombin complex concentrate IVPB (KCENTRA) 1500 International Unit(s) IV Intermittent once  sodium chloride 0.9%. 1000 milliLiter(s) (75 mL/Hr) IV Continuous <Continuous>    MEDICATIONS  (PRN):  dextrose 40% Gel 15 Gram(s) Oral once PRN Blood Glucose LESS THAN 70 milliGRAM(s)/deciLiter  glucagon  Injectable 1 milliGRAM(s) IntraMuscular once PRN Glucose <70 milliGRAM(s)/deciLiter      LABS:                        14.3   18.6  )-----------( 267      ( 10 Oct 2018 16:27 )             41.6     Hgb Trend: 14.3<--, 13.8<--  10-10    136  |  102  |  10  ----------------------------<  145<H>  3.7   |  21<L>  |  0.59    Ca    8.8      10 Oct 2018 16:27  Phos  1.5     10-10  Mg     1.7     10-10    TPro  7.2  /  Alb  4.0  /  TBili  0.7  /  DBili  x   /  AST  25  /  ALT  16  /  AlkPhos  116  10-10    Creatinine Trend: 0.59<--, 0.80<--  PT/INR - ( 10 Oct 2018 16:27 )   PT: 12.1 sec;   INR: 1.11 ratio         PTT - ( 10 Oct 2018 16:27 )  PTT:29.9 sec  Urinalysis Basic - ( 10 Oct 2018 16:27 )    Color: Colorless / Appearance: Clear / S.020 / pH: x  Gluc: x / Ketone: Negative  / Bili: Negative / Urobili: Negative   Blood: x / Protein: Trace / Nitrite: Negative   Leuk Esterase: Negative / RBC: 2 /hpf / WBC 0 /hpf   Sq Epi: x / Non Sq Epi: 0 /hpf / Bacteria: Negative      Arterial Blood Gas:  10-10 @ 16:28  7.42/38/212/24/100/.6  ABG lactate: --        MICROBIOLOGY:     RADIOLOGY & ADDITIONAL TESTS:      < from: CT Abdomen and Pelvis w/ IV Cont (10.10.18 @ 16:25) >    IMPRESSION:   No acute traumatic injury.     Multiple pulmonary nodules measuring up to 0.4 cm, for which follow-up CT   is recommended in 3-6 months. If stable, consider CT at 2 years and 4   years.      < end of copied text >      < from: CT Head No Cont (10.10.18 @ 16:24) >    IMPRESSION: Age-appropriate involutional and ischemic gliotic changes.   Old right basal ganglia ischemic changes unchanged from 12:51 PM. No   hemorrhage.    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     #Neuro  Seizure  - patient with generalized tremors and subsequent confusion prior to presentation  - CT head x 2 did not show any acute changes  - started on Keppra for seizure per neurology  - vEEG ordered  - Will check urine and blood tox, alcohol and salicylate level  - will obtain MRI brain w/o con, and MRA head w/o cont, and MRA neck with con    #Resp  - Intubated for airway protection in active seizure  - Continue mechanical ventilation  - Will obtain ABG  COPD  - will start     #CV  HTN  - Patient with history of hypertension, currently SBP was 230s  - started on Nicardipine ggt. will hold for now   - BP goal of 180/100   - Will obtain CE, TSH level, and lipid profile  Atrial Fibrillation  - [id number] of 6  - Coumadin reversed in the ED with K-centra and VitK in the setting of possible trauma  - will hold Heparin gtt for now, and start in the AM  - patient currently not rate controlled. Will give Dilt PRN for HR >120  - will obtain TTE    #GI  - CT abd was benign for acute trauma  - will start tube feeds     #Renal  - stable  - c/w soria  - monitor electrolytes    #Heme/Onc  - stable    #Endo  DM  - on home insulin    - will start low dose ISS with q6hr finger sticks  - check A1C    #ID  Leukocytosis  - Leukocytosis to 18.6  - CXR negative for consolidations, UA negative  - likely reactive  - will check blood cx     #DVT ppx  - previously anticoagulated with Warfarin MICU Accept Note    CHIEF COMPLAINT:     HPI / INTERVAL HISTORY:  Patient is 73 year old female PMHx of Afib on coumadin, left spastic hemiparesis presented after a fall, unwitnessed.  Patient was reportedly found by family on the ground.  She was reportedly shaking and then confused, but then returned to baseline per her family. She was transported to the ED. Initial CT head / c-spine which were negative. Approximately three hours later, the patient seized again and was intubated for airway protection and admitted to the SICU.    Patient's vitals were notable for tachycardia and hypertension to the SBP 230s started on Nicardipine ggt. Labs significant for leukocytosis to the 18s with normal lactate of 1.2. Patient started on Keppra mitch neurology. MICU consulted for possible seizures     PAST MEDICAL & SURGICAL HISTORY:  DVT (deep venous thrombosis)  HLD (hyperlipidemia)  COPD (chronic obstructive pulmonary disease)  No significant past surgical history      FAMILY HISTORY:  No pertinent family history      SOCIAL HISTORY:  Smoking: [  ] Never Smoked  [ X ] Former Smoker (1 packs x 40 years)    Substance Use:   EtOH Use: History of EtOH abuse. Quit 25 years   Marital Status: [  ] Single  [  ]   [  ]   [  ]   Sexual History:   Occupation:  Recent Travel:  Country of Birth:   Advance Directives:     HOME MEDICATIONS:      Allergies    No Known Allergies    Intolerances          REVIEW OF SYSTEMS:  [X ] Unable to assess ROS because intubated and sedated     OBJECTIVE:  ICU Vital Signs Last 24 Hrs  T(C): 36.3 (10 Oct 2018 15:30), Max: 36.9 (10 Oct 2018 12:45)  T(F): 97.3 (10 Oct 2018 15:30), Max: 98.5 (10 Oct 2018 12:45)  HR: 123 (10 Oct 2018 17:30) (78 - 140)  BP: 150/71 (10 Oct 2018 17:30) (143/77 - 226/123)  BP(mean): 101 (10 Oct 2018 17:30) (101 - 162)  ABP: --  ABP(mean): --  RR: 14 (10 Oct 2018 17:30) (14 - 18)  SpO2: 100% (10 Oct 2018 17:30) (95% - 100%)    Mode: AC/ CMV (Assist Control/ Continuous Mandatory Ventilation), RR (machine): 14, TV (machine): 450, FiO2: 100, PEEP: 5, ITime: 1, MAP: 8, PIP: 19    10-10 @ 07:01  -  10-10 @ 17:49  --------------------------------------------------------  IN: 300.6 mL / OUT: 1000 mL / NET: -699.4 mL      CAPILLARY BLOOD GLUCOSE      POCT Blood Glucose.: 100 mg/dL (10 Oct 2018 14:28)      PHYSICAL EXAM:  GENERAL: intubated and sedated.  HEAD:  hematoma over left orbit  EYES: EOMI, PERRLA, conjunctiva and sclera clear  NECK: Supple, No JVD  CHEST/LUNG: Clear to auscultation bilaterally; No wheeze  HEART: Tachycardic. Irregularly irregular rhythm; No murmurs, rubs, or gallops  ABDOMEN: Soft, Nontender, Nondistended; Bowel sounds present  EXTREMITIES:  2+ Peripheral Pulses, No clubbing, cyanosis, or edema  NEUROLOGY: intubated and sedated. left pupil constricted and nonreactive   SKIN: No rashes or lesions      LINES:     HOSPITAL MEDICATIONS:  MEDICATIONS  (STANDING):  acetaminophen  IVPB .. 1000 milliGRAM(s) IV Intermittent once  dextrose 5%. 1000 milliLiter(s) (50 mL/Hr) IV Continuous <Continuous>  dextrose 50% Injectable 12.5 Gram(s) IV Push once  dextrose 50% Injectable 25 Gram(s) IV Push once  dextrose 50% Injectable 25 Gram(s) IV Push once  insulin lispro (HumaLOG) corrective regimen sliding scale   SubCutaneous every 6 hours  levETIRAcetam  IVPB 500 milliGRAM(s) IV Intermittent every 12 hours  multivitamin/thiamine/folic acid in sodium chloride 0.9% 1000 milliLiter(s) (75 mL/Hr) IV Continuous <Continuous>  niCARdipine Infusion 5 mG/Hr (25 mL/Hr) IV Continuous <Continuous>  phytonadione  IVPB 10 milliGRAM(s) IV Intermittent once  potassium phosphate IVPB 30 milliMole(s) IV Intermittent once  propofol Infusion 10 MICROgram(s)/kG/Min (2.586 mL/Hr) IV Continuous <Continuous>  prothrombin complex concentrate IVPB (KCENTRA) 1500 International Unit(s) IV Intermittent once  sodium chloride 0.9%. 1000 milliLiter(s) (75 mL/Hr) IV Continuous <Continuous>    MEDICATIONS  (PRN):  dextrose 40% Gel 15 Gram(s) Oral once PRN Blood Glucose LESS THAN 70 milliGRAM(s)/deciLiter  glucagon  Injectable 1 milliGRAM(s) IntraMuscular once PRN Glucose <70 milliGRAM(s)/deciLiter      LABS:                        14.3   18.6  )-----------( 267      ( 10 Oct 2018 16:27 )             41.6     Hgb Trend: 14.3<--, 13.8<--  10-10    136  |  102  |  10  ----------------------------<  145<H>  3.7   |  21<L>  |  0.59    Ca    8.8      10 Oct 2018 16:27  Phos  1.5     10-10  Mg     1.7     10-10    TPro  7.2  /  Alb  4.0  /  TBili  0.7  /  DBili  x   /  AST  25  /  ALT  16  /  AlkPhos  116  10-10    Creatinine Trend: 0.59<--, 0.80<--  PT/INR - ( 10 Oct 2018 16:27 )   PT: 12.1 sec;   INR: 1.11 ratio         PTT - ( 10 Oct 2018 16:27 )  PTT:29.9 sec  Urinalysis Basic - ( 10 Oct 2018 16:27 )    Color: Colorless / Appearance: Clear / S.020 / pH: x  Gluc: x / Ketone: Negative  / Bili: Negative / Urobili: Negative   Blood: x / Protein: Trace / Nitrite: Negative   Leuk Esterase: Negative / RBC: 2 /hpf / WBC 0 /hpf   Sq Epi: x / Non Sq Epi: 0 /hpf / Bacteria: Negative      Arterial Blood Gas:  10-10 @ 16:28  7.42/38/212/24/100/.6  ABG lactate: --        MICROBIOLOGY:     RADIOLOGY & ADDITIONAL TESTS:      < from: CT Abdomen and Pelvis w/ IV Cont (10.10.18 @ 16:25) >    IMPRESSION:   No acute traumatic injury.     Multiple pulmonary nodules measuring up to 0.4 cm, for which follow-up CT   is recommended in 3-6 months. If stable, consider CT at 2 years and 4   years.      < end of copied text >      < from: CT Head No Cont (10.10.18 @ 16:24) >    IMPRESSION: Age-appropriate involutional and ischemic gliotic changes.   Old right basal ganglia ischemic changes unchanged from 12:51 PM. No   hemorrhage.    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     #Neuro  Seizure  - patient with generalized tremors and subsequent confusion prior to presentation  - CT head x 2 did not show any acute changes  - started on Keppra for seizure per neurology  - vEEG ordered  - Will check urine and blood tox, alcohol and salicylate level  - will obtain MRI brain w/o con, and MRA head w/o cont, and MRA neck with con    #Resp  - Intubated for airway protection in active seizure  - Continue mechanical ventilation  - Will obtain ABG  COPD  - will start     #CV  HTN  - Patient with history of hypertension, currently SBP was 230s  - started on Nicardipine ggt. will hold for now   - BP goal of 180/100   - Will obtain CE, TSH level, and lipid profile  Atrial Fibrillation  - [id number] of 6  - Coumadin reversed in the ED with K-centra and VitK in the setting of possible trauma  - will hold Heparin gtt for now, and start in the AM  - patient currently not rate controlled. Will give Dilt PRN for HR >120  - will obtain TTE    #GI  - CT abd was benign for acute trauma  - will start tube feeds     #Renal  - stable  - c/w soria  - monitor electrolytes    #Heme/Onc  - stable    #Endo  DM  - on home insulin    - will start low dose ISS with q6hr finger sticks  - check A1C    #ID  Leukocytosis  - Leukocytosis to 18.6  - CXR negative for consolidations, UA negative  - likely reactive  - will check blood cx     #DVT ppx  - previously anticoagulated with Warfarin      Critical Care AttendinF with Hx COPD, Chronic AFib on AC, Rt BG-CVA with baseline Lt Hemiparesis and dysphagia, S/P PEG (revision done) presented with fall, metabolic encephalopathy, HTN urgency, seizure-like activity was intubated and transferred to SICU for R/O Trauma Level1 but initial W/U including CT head negative and seen and followed by Neuro/Stroke Service pending MRI and vEEG.   - Transfer to MICU for CVA management, Ventilator Support, Metabolic Encephalopathy and Seizure Prophylaxis and vEEG monitoring   - COPD medication coverage on ventilator    - Careful post CVA BP control plan and Neuro check per ICU protocol pending Neuro and MRI follow up   - NGT insertion and enteral feeding plan   - S/P Coagulopathy Reversal; Restart anticoagulation for chronic AFib with Heparin gtt in AM     Critical Care Time = 45 Min

## 2018-10-10 NOTE — H&P ADULT - NSHPPHYSICALEXAM_GEN_ALL_CORE
T(C): 37 (10-10-18 @ 19:00), Max: 37 (10-10-18 @ 19:00)  HR: 78 (10-10-18 @ 20:30) (78 - 140)  BP: 115/58 (10-10-18 @ 20:30) (89/44 - 226/123)  RR: 14 (10-10-18 @ 20:30) (14 - 39)  SpO2: 100% (10-10-18 @ 20:30) (95% - 100%)  POCT Blood Glucose.: 100 mg/dL (10 Oct 2018 14:28)      General: intubated, sedated, paralyzed  HEENT: PEERLA, 4mm, left periorbital ecchymosis, ETT in place,   Neck: Soft, midline trachea, atraumatic (cervical collar cleared by ER prior to trauma activation)  Chest: No chest wall tenderness, thorax stable, no ecchymosis.   Cardiac: tachycardic  Respiratory: Bilateral breath sounds, equal bilaterally.   Abdomen: Soft, non-distended, no ecchymosis.   Pelvis: Stable,   Ext: palp radial b/l UE, b/l DP palp in Lower Extrem, LUE with ecchymosis at base of 4th digit and on dorsum of hand  Back: no palpable runoff/stepoff/deformity, no abrasions.   Rectal: No ramirez blood

## 2018-10-10 NOTE — CONSULT NOTE ADULT - ASSESSMENT
72 y/o woman PMH of DM, COPD, CVA with left sided weakness at baseline presenting after unwitnessed fall. Patient was reportedly found on the ground with hematoma to left periorbital area. Family member noticed that pt started shaking whole body which lasted 1 minute and after she was confused for about 10-15 minutes. In the ED, she was back to baseline but then had another witnessed seizure and was intubated and loaded on Keppra. Patient is currently intubated and sedated. No previous seizure activity. CT not showing acute changes.    Plan:  -Start Keppra 500 BID  -vEEG  -MRI brain w/o con when stable

## 2018-10-10 NOTE — ED ADULT TRIAGE NOTE - CHIEF COMPLAINT QUOTE
new onset seizure, fell/ hematoma to face and pain to left hip, flank, nonverbal at baseline hx CVA, takes coumadin

## 2018-10-10 NOTE — H&P ADULT - HISTORY OF PRESENT ILLNESS
HPI: Patient is a 73y old  Female who presented after a fall, unwitnessed.  Patient was reportedly found by family on the ground.  She was reportedly shaking and then confused, but then acted like her self.  Per ER, she had a CT head / c-spine which were negative.  However, after the initial CT scans, she began to have seizure like activity and was intubated for airway protection.  Level 1 trauma was called at time of seizure like activity.      Primary Survey:   A - AMS so intubated, positive color change, bilateral breath sounds, later CXR confirmed good ETT position.  B - bilateral breath sounds  C - initial BP: 161/92 , HR: 78, palpable pulses in all extremities.  Hypertensive to SBPs 230s.    D - GCS 15 on arrival  Exposure obtained    Secondary Survey: ***  General: NAD  HEENT: Normocephalic, atraumatic, EOMI, PEERLA.  Neck: Soft, midline trachea, C-collar in place.   Chest: No chest wall tenderness, thorax stable, no ecchymosis.   Cardiac: S1, S2, RRR.   Respiratory: Bilateral breath sounds, clear and equal bilaterally.   Abdomen: Soft, non-distended, non-tender, no rebound, no guarding, no ecchymosis.   Pelvis: Stable, non-tender.   Ext: palp radial b/l UE, b/l DP palp in Lower Extrem.   Back: no TTP, no palpable runoff/stepoff/deformity, no abrasions.   Rectal: No ramirez blood, ENE with good tone.     He had 2 Fast Exams performed, which failed to reveal any free fluid in the abdomen, or any cardiac effusions/tamponade or any pneumothorax. HPI: Patient is a 73y old  Female who presented after a fall, unwitnessed.  Patient was reportedly found by family on the ground.  She was reportedly shaking and then confused, but then acted like her self.  Per ER, she had a CT head / c-spine which were negative.  However, after the initial CT scans, she began to have seizure like activity and was intubated for airway protection.  Level 1 trauma was called at time of seizure like activity.      Primary Survey:   A - AMS so intubated, positive color change, bilateral breath sounds, later CXR confirmed good ETT position.  B - bilateral breath sounds  C - initial BP: 161/92 , HR: 78, palpable pulses in all extremities.  Hypertensive to SBPs 230s.    D - GCS 15 on arrival  Exposure obtained    Secondary Survey:  General: intubated, sedated, paralyzed  HEENT: PEERLA, 4mm, left periorbital ecchymosis, ETT in place,   Neck: Soft, midline trachea, atraumatic (cervical collar cleared by ER prior to trauma activation)  Chest: No chest wall tenderness, thorax stable, no ecchymosis.   Cardiac: tachycardic  Respiratory: Bilateral breath sounds, equal bilaterally.   Abdomen: Soft, non-distended, no ecchymosis.   Pelvis: Stable,   Ext: palp radial b/l UE, b/l DP palp in Lower Extrem, LUE with ecchymosis at base of 4th digit and on dorsum of hand  Back: no palpable runoff/stepoff/deformity, no abrasions.   Rectal: No ramirez blood    He had Fast Exam performed, which failed to reveal any free fluid in the abdomen HPI: Patient is a 73y old  Female on coumadin who presented after a fall, unwitnessed.  Patient was reportedly found by family on the ground.  She was reportedly shaking and then confused, but then acted like herself.  Per ER, she had a CT head / c-spine which were negative.  However, after the initial CT scans, she began to have seizure like activity and was intubated for airway protection.  Level 1 trauma was called at time of seizure like activity.      Primary Survey:   A - AMS so intubated, positive color change, bilateral breath sounds, later CXR confirmed good ETT position.  B - bilateral breath sounds  C - initial BP: 161/92 , HR: 78, palpable pulses in all extremities.  Hypertensive to SBPs 230s.    D - GCS 15 on arrival, decreased to GCS 10 after ativan for seizure-like activity  Exposure obtained    Secondary Survey:  General: intubated, sedated, paralyzed  HEENT: PEERL, 4mm, left periorbital ecchymosis, ETT in place,   Neck: Soft, midline trachea, atraumatic (cervical collar cleared by ER staff when pt was still mentating well, prior to trauma activation)  Chest: No chest wall tenderness, thorax stable, no ecchymosis.   Cardiac: tachycardic  Respiratory: Bilateral breath sounds, equal bilaterally.   Abdomen: Soft, non-distended, no ecchymosis.   Pelvis: Stable,   Ext: palp radial b/l UE, b/l DP palp in Lower Extrem, LUE with ecchymosis at base of 4th digit and on dorsum of hand  Back: no palpable runoff/stepoff/deformity, no abrasions.   Rectal: No ramirez blood    He had Fast Exam performed, which failed to reveal any free fluid in the abdomen

## 2018-10-10 NOTE — ED PROVIDER NOTE - PMH
COPD (chronic obstructive pulmonary disease)    DVT (deep venous thrombosis)    HLD (hyperlipidemia)

## 2018-10-10 NOTE — CONSULT NOTE ADULT - SUBJECTIVE AND OBJECTIVE BOX
Neurology Consult    Name  DELTA WADE    HPI:  74 y/o woman PMH of DM, COPD, CVA with left sided weakness at baseline presenting after unwitnessed fall. Patient was reportedly found on the ground with hematoma to left periorbital area. Family member noticed that pt started shaking whole body which lasted 1 minute and after she was confused for about 10-15 minutes. In the ED, she was back to baseline but then had another witnessed seizure and was intubated and loaded on Keppra. Patient is currently intubated and sedated. Family denies previous seizure activity other than the 2 episodes today. Of note, patient is taking coumadin but reason is unknown. No bleed seen on CTH done in ED.      MEDICATIONS  (STANDING):  acetaminophen  IVPB .. 1000 milliGRAM(s) IV Intermittent once  fentaNYL    Injectable 50 MICROGram(s) IV Push once  fentaNYL    Injectable 50 MICROGram(s) IV Push once  levETIRAcetam  IVPB 1000 milliGRAM(s) IV Intermittent every 12 hours  LORazepam   Injectable 1 milliGRAM(s) IV Push Once  phytonadione  IVPB 10 milliGRAM(s) IV Intermittent once  prothrombin complex concentrate IVPB (KCENTRA) 1500 International Unit(s) IV Intermittent once  rocuronium Injectable 100 milliGRAM(s) IV Push once    MEDICATIONS  (PRN):      Allergies    No Known Allergies    Intolerances        Objective:   Vital Signs Last 24 Hrs  T(C): 36.9 (10 Oct 2018 15:00), Max: 36.9 (10 Oct 2018 12:45)  T(F): 98.4 (10 Oct 2018 15:00), Max: 98.5 (10 Oct 2018 12:45)  HR: 115 (10 Oct 2018 15:02) (78 - 140)  BP: 171/114 (10 Oct 2018 15:00) (161/92 - 183/95)  BP(mean): --  RR: 16 (10 Oct 2018 15:00) (16 - 18)  SpO2: 98% (10 Oct 2018 15:02) (95% - 100%)    patient is currently on propofol      General Exam:   General appearance: No acute distress                   Neurological Exam:  Mental Status: intubated, sedated, not following any commands, does not open eyes to vocal or noxious stimuli.     Cranial Nerves:  PERRL, facial symmetry intact    Motor: does not move extremities to noxious stimuli.     Sensation: Intact to LT throughout    Coordination: deferred due to MS      Labs:    10-10    141  |  105  |  12  ----------------------------<  100<H>  4.0   |  28  |  0.80    Ca    9.3      10 Oct 2018 13:00    TPro  7.3  /  Alb  3.9  /  TBili  0.4  /  DBili  x   /  AST  21  /  ALT  16  /  AlkPhos  106  10-10    LIVER FUNCTIONS - ( 10 Oct 2018 13:00 )  Alb: 3.9 g/dL / Pro: 7.3 g/dL / ALK PHOS: 106 U/L / ALT: 16 U/L / AST: 21 U/L / GGT: x           CBC Full  -  ( 10 Oct 2018 13:00 )  WBC Count : 6.6 K/uL  Hemoglobin : 13.8 g/dL  Hematocrit : 39.9 %  Platelet Count - Automated : 274 K/uL  Mean Cell Volume : 94.3 fl  Mean Cell Hemoglobin : 32.5 pg  Mean Cell Hemoglobin Concentration : 34.5 gm/dL  Auto Neutrophil # : 4.0 K/uL  Auto Lymphocyte # : 1.8 K/uL  Auto Monocyte # : 0.6 K/uL  Auto Eosinophil # : 0.2 K/uL  Auto Basophil # : 0.0 K/uL  Auto Neutrophil % : 59.9 %  Auto Lymphocyte % : 27.1 %  Auto Monocyte % : 8.8 %  Auto Eosinophil % : 3.6 %  Auto Basophil % : 0.5 %      Radiology  CTH: IMPRESSION: Age-appropriate involutional and ischemic gliotic changes.   Old right basal ganglia infarct with ex vacuo dilatation of the right   lateral ventricle unchanged since 4/13/2015. No facial fractures.

## 2018-10-10 NOTE — ED PROVIDER NOTE - OBJECTIVE STATEMENT
Attending Aiyana Escobar: 72 y/o female h/o DM, COPD, CVA with left sided weakness at baseline presenting after unwitnessed fall. pt reportedly found on the ground with hematoma to left periorbital area. per family member noticed that pt started shaking. lased a minutes and then afterward pt confused for a few minutes. currently acting like herself. no urinary or fecal incontinence. no h/o seizures. no reports of fall prior to today. no new medications. no fevers or chills. is on coumadin

## 2018-10-10 NOTE — H&P ADULT - NSHPLABSRESULTS_GEN_ALL_CORE
CBC (10-10 @ 16:27)                              14.3                           18.6<H>  )----------------(  267        --    % Neutrophils, --    % Lymphocytes, ANC: --                                  41.6    CBC (10-10 @ 13:00)                              13.8                           6.6     )----------------(  274        59.9  % Neutrophils, 27.1  % Lymphocytes, ANC: 4.0                                 39.9      BMP (10-10 @ 16:27)             136     |  102     |  10    		Ca++ --      Ca 8.8                ---------------------------------( 145<H>		Mg 1.7                3.7     |  21<L>   |  0.59  			Ph 1.5<L>  BMP (10-10 @ 13:00)             141     |  105     |  12    		Ca++ --      Ca 9.3                ---------------------------------( 100<H>		Mg --                 4.0     |  28      |  0.80  			Ph --        LFTs (10-10 @ 16:27)      TPro 7.2 / Alb 4.0 / TBili 0.7 / DBili -- / AST 25 / ALT 16 / AlkPhos 116  LFTs (10-10 @ 13:00)      TPro 7.3 / Alb 3.9 / TBili 0.4 / DBili -- / AST 21 / ALT 16 / AlkPhos 106    Coags (10-10 @ 16:27)  aPTT 29.9 / INR 1.11 / PT 12.1  Coags (10-10 @ 13:00)  aPTT 34.5 / INR 2.63<H> / PT 29.2<H>    Cardiac Markers (10-10 @ 19:03)     HSTrop: -- / CKMB: -- / CK: 435    ABG (10-10 @ 16:28)     7.42 / 38 / 212<H> / 24 / .6 / 100<H>%     Lactate:        ---------------------------------------------------------------------------------------------     IMAGING:  < from: CT Cervical Spine No Cont (10.10.18 @ 12:58) >  IMPRESSION: No acute fractures or dislocations. Degenerative changes.  < end of copied text >    < from: CT Head and Maxillofacial No Cont (10.10.18 @ 12:58) >  IMPRESSION: Age-appropriate involutional and ischemic gliotic changes.   Old right basal ganglia infarct with ex vacuo dilatation of the right   lateral ventricle unchanged since 4/13/2015. No facial fractures.   < end of copied text >    < from: CT Chest, Abdomen, Pelvis w/ IV Cont (10.10.18 @ 15:19) >    IMPRESSION:   No acute traumatic injury.     Multiple pulmonary nodules measuring up to 0.4 cm, for which follow-up CT   is recommended in 3-6 months. If stable, consider CT at 2 years and 4   years.  < end of copied text >    < from: CT Head No Cont (10.10.18 @ 16:24) >    IMPRESSION: Age-appropriate involutional and ischemic gliotic changes.   Old right basal ganglia ischemic changes unchanged from 12:51 PM. No   hemorrhage.    < end of copied text >

## 2018-10-10 NOTE — CONSULT NOTE ADULT - ASSESSMENT
73F Hx Afib on coumadin, CVA with residual right weakness presented after unwitnessed fall and seizure activity. 73F Hx Afib on coumadin, CVA with residual right weakness presented after unwitnessed fall and seizure activity.     Neuro:    CV:    Respiratory:    GI:    Renal:    Heme:    I 73F Hx Afib on coumadin, CVA with residual right weakness presented after unwitnessed fall and seizure activity.     Neuro:  EEG ordered  Propofol for sedation  Tylenol IV   Keppra for serisure activity per neurology    CV:  Patient currently hypertensive and has history of hypertension.   Will restart amlodipine and losartan     Respiratory:  Continue mechanical ventilation  Will trend ABG's    GI:  NPO  Will place OG tube  Will start protonix after 48 hours    Renal:  Stict I's O's  C/w soria  Electrolyte WNL, will replete prn    Heme:  Patient received K-centra and Vitamin K  INR ~2.5    Endo:  Blood glucose 100    Dispo:  SICU

## 2018-10-10 NOTE — ED PROVIDER NOTE - PHYSICAL EXAMINATION
Attending Aiyana Escobar: Gen: NAD, heent: left periorbital ecchymoses, swelling left periorbital area, eomi, perrla, mmm, op pink, uvula midline, neck; nttp, no nuchal rigidity, chest: nttp, no crepitus, cv: rrr, no murmurs, lungs: ctab, abd: soft, nontender, nondistended, no peritoneal signs, +BS, no guarding, ext: wwp, neg homans, skin: no rash, neuro: awake and alert, following commands, residual left sided deficits

## 2018-10-10 NOTE — ED PROVIDER NOTE - PROGRESS NOTE DETAILS
Attending Aiyana Escobar: pt had seizure activity with tachycardia placed on NRB. was also complaining of left sided chest wall pain. pt given ativan and loaded with keppa\ra. taken to ct scan for concern for possble rebleed as on blood thinners. while in hallwaypt hypoxic to 70's and declining. taken back to the trauma room and level 1 trauma called. Attending Aiyana Escobar: pt taken to the SICU after ct scans. given kcentra initially as concern with seizure activity and coumadin use for re-bleed. Repeat CT scan showed no sig bleed and neurology consulted who will see pt in the sicu

## 2018-10-10 NOTE — ED PROVIDER NOTE - MEDICAL DECISION MAKING DETAILS
Attending Aiyana Escobar: 74 y/o female h/o CVA on coumadin presenting after possible seizure activity. upon arrival pt well appearing, protecting her airway. has left sided deficits. pt has improved since arrival.  concern for possible intracranial pathology with recent trauma. abd soft and nontender. will obtain labs, CT head, neck, and consider neurology evaluation.

## 2018-10-10 NOTE — H&P ADULT - ASSESSMENT
ASSESSMENT: Patient is a 73y old f with ***    PLAN:  ***  -   -   -   -   - Patient seen/examined with attending.  - Plan to be discussed with Attending, Dr. Chloé Styles MD PGY3  Acute Care Surgery, #[id number] ASSESSMENT: Patient is a 73y old f with seizure-like activity, fall on coumadin    PLAN:   - No evidence of traumatic injury related to fall  - F/u with MICU, neurology regarding workup/treatment of seizure-like activity and altered mental status  - INR reversed during trauma activation with 1500U K centra and 10mg IV Vit. K for acute concerns for bleeding  - Patient seen/examined with and plan discussed with Attending, Dr. Barnett.    Chloé Styles MD PGY4  Acute Care Surgery, #[id number]

## 2018-10-10 NOTE — CONSULT NOTE ADULT - SUBJECTIVE AND OBJECTIVE BOX
SICU Consult  Consulting surgical team: SICU  Consulting attending: Musa    HPI:  HPI: Patient is a 73y old  Female Hx Afib on coumadin, who presented after a fall, unwitnessed.  Patient was reportedly found by family on the ground.  She was reportedly shaking and then confused, but then returned to baseline per her family. She was transported to the ED. Initial CT head / c-spine which were negative. Approximately three hours later, the patient seized again and was intubated for airway protection (level 1 trauma ). After the patient was intubated she was taken for a CT head and subsequently transferred to the SICU. Family reports patient never had seizures in the past.     PAST MEDICAL HISTORY:  Atrial fibrillation  Anxiety/Depression  DVT (deep venous thrombosis)  HLD (hyperlipidemia)  HTN  COPD (chronic obstructive pulmonary disease)    PAST SURGICAL HISTORY:  No significant past surgical history      MEDICATIONS:  acetaminophen  IVPB .. 1000 milliGRAM(s) IV Intermittent once  levETIRAcetam  IVPB 1000 milliGRAM(s) IV Intermittent every 12 hours  levETIRAcetam  IVPB 500 milliGRAM(s) IV Intermittent every 12 hours  LORazepam   Injectable 1 milliGRAM(s) IV Push Once  phytonadione  IVPB 10 milliGRAM(s) IV Intermittent once  propofol Infusion 10 MICROgram(s)/kG/Min IV Continuous <Continuous>  propofol Infusion 10 MICROgram(s)/kG/Min IV Continuous <Continuous>  prothrombin complex concentrate IVPB (KCENTRA) 1500 International Unit(s) IV Intermittent once  rocuronium Injectable 100 milliGRAM(s) IV Push once  sodium chloride 0.9%. 1000 milliLiter(s) IV Continuous <Continuous>      ALLERGIES:  No Known Allergies      VITALS & I/Os:  Vital Signs Last 24 Hrs  T(C): 36.9 (10 Oct 2018 15:00), Max: 36.9 (10 Oct 2018 12:45)  T(F): 98.4 (10 Oct 2018 15:00), Max: 98.5 (10 Oct 2018 12:45)  HR: 132 (10 Oct 2018 15:35) (78 - 140)  BP: 171/114 (10 Oct 2018 15:00) (161/92 - 183/95)  BP(mean): --  RR: 16 (10 Oct 2018 15:00) (16 - 18)  SpO2: 98% (10 Oct 2018 15:35) (95% - 100%)  Mode: AC/ CMV (Assist Control/ Continuous Mandatory Ventilation)  RR (machine): 14  TV (machine): 450  FiO2: 100  PEEP: 5  ITime: 1  MAP: 8  PIP: 19    I&O's Summary      PHYSICAL EXAM:  General: No acute distress. Left radhika-orbital swelling present.  Respiratory: Nonlabored, mechanically ventilated. No rib fractures noted.   Cardiovascular: RRR, S1 S2 present  Abdominal: Soft, nondistended, nontender. No rebound or guarding. No organomegaly, no palpable mass.  Extremities: Warm    LABS:                        13.8   6.6   )-----------( 274      ( 10 Oct 2018 13:00 )             39.9     10-10    141  |  105  |  12  ----------------------------<  100<H>  4.0   |  28  |  0.80    Ca    9.3      10 Oct 2018 13:00    TPro  7.3  /  Alb  3.9  /  TBili  0.4  /  DBili  x   /  AST  21  /  ALT  16  /  AlkPhos  106  10-10    Lactate:    PT/INR - ( 10 Oct 2018 13:00 )   PT: 29.2 sec;   INR: 2.63 ratio         PTT - ( 10 Oct 2018 13:00 )  PTT:34.5 sec      IMAGING:  < from: Xray Chest 1 View- PORTABLE-Urgent (10.10.18 @ 15:08) >  INTERPRETATION:  A single chest x-ray was obtained on October 10, 2018.    Indication: Trauma.    Impression:    The heart is normal in size. The lungs appear to be clear. Endotracheal   tube is in good position. No acute fractures could be identified. No   pneumothorax. If clinically warranted dedicated rib films should be   obtained.    < end of copied text >    < from: CT Maxillofacial No Cont (10.10.18 @ 12:58) >      IMPRESSION: No acute fractures or dislocations. Degenerative changes.    < end of copied text >    < from: CT Cervical Spine No Cont (10.10.18 @ 12:58) >  IMPRESSION: No acute fractures or dislocations. Degenerative changes.    < end of copied text >    < from: CT Head No Cont (10.10.18 @ 12:58) >  IMPRESSION: Age-appropriate involutional and ischemic gliotic changes.   Old right basal ganglia infarct with ex vacuo dilatation of the right   lateral ventricle unchanged since 4/13/2015. No facial fractures.     < end of copied text > SICU Consult  Consulting surgical team: SICU  Consulting attending: Musa    HPI:  HPI: Patient is a 73y old  Female Hx Afib on coumadin, who presented after a fall, unwitnessed.  Patient was reportedly found by family on the ground.  She was reportedly shaking and then confused, but then returned to baseline per her family. She was transported to the ED. Initial CT head / c-spine which were negative. Approximately three hours later, the patient seized again and was intubated for airway protection (level 1 trauma ). After the patient was intubated she was taken for a CT head and subsequently transferred to the SICU. Family reports patient never had seizures in the past.     PAST MEDICAL HISTORY:  Atrial fibrillation  Anxiety/Depression  DVT (deep venous thrombosis)  HLD (hyperlipidemia)  HTN  COPD (chronic obstructive pulmonary disease)    PAST SURGICAL HISTORY:  No significant past surgical history      MEDICATIONS:  acetaminophen  IVPB .. 1000 milliGRAM(s) IV Intermittent once  levETIRAcetam  IVPB 1000 milliGRAM(s) IV Intermittent every 12 hours  levETIRAcetam  IVPB 500 milliGRAM(s) IV Intermittent every 12 hours  LORazepam   Injectable 1 milliGRAM(s) IV Push Once  phytonadione  IVPB 10 milliGRAM(s) IV Intermittent once  propofol Infusion 10 MICROgram(s)/kG/Min IV Continuous <Continuous>  propofol Infusion 10 MICROgram(s)/kG/Min IV Continuous <Continuous>  prothrombin complex concentrate IVPB (KCENTRA) 1500 International Unit(s) IV Intermittent once  rocuronium Injectable 100 milliGRAM(s) IV Push once  sodium chloride 0.9%. 1000 milliLiter(s) IV Continuous <Continuous>      ALLERGIES:  No Known Allergies      VITALS & I/Os:  Vital Signs Last 24 Hrs  T(C): 36.9 (10 Oct 2018 15:00), Max: 36.9 (10 Oct 2018 12:45)  T(F): 98.4 (10 Oct 2018 15:00), Max: 98.5 (10 Oct 2018 12:45)  HR: 132 (10 Oct 2018 15:35) (78 - 140)  BP: 171/114 (10 Oct 2018 15:00) (161/92 - 183/95)  BP(mean): --  RR: 16 (10 Oct 2018 15:00) (16 - 18)  SpO2: 98% (10 Oct 2018 15:35) (95% - 100%)  Mode: AC/ CMV (Assist Control/ Continuous Mandatory Ventilation)  RR (machine): 14  TV (machine): 450  FiO2: 100  PEEP: 5  ITime: 1  MAP: 8  PIP: 19    I&O's Summary      PHYSICAL EXAM:  General: No acute distress. Left radhika-orbital swelling present.  Respiratory: Nonlabored, mechanically ventilated. No rib fractures noted.   Cardiovascular: RRR, S1 S2 present  Abdominal: Soft, nondistended, nontender. No rebound or guarding. No organomegaly, no palpable mass.  Extremities: Warm, no injuries or deformities noted.     LABS:                        13.8   6.6   )-----------( 274      ( 10 Oct 2018 13:00 )             39.9     10-10    141  |  105  |  12  ----------------------------<  100<H>  4.0   |  28  |  0.80    Ca    9.3      10 Oct 2018 13:00    TPro  7.3  /  Alb  3.9  /  TBili  0.4  /  DBili  x   /  AST  21  /  ALT  16  /  AlkPhos  106  10-10    Lactate:    PT/INR - ( 10 Oct 2018 13:00 )   PT: 29.2 sec;   INR: 2.63 ratio         PTT - ( 10 Oct 2018 13:00 )  PTT:34.5 sec      IMAGING:  < from: Xray Chest 1 View- PORTABLE-Urgent (10.10.18 @ 15:08) >  INTERPRETATION:  A single chest x-ray was obtained on October 10, 2018.    Indication: Trauma.    Impression:    The heart is normal in size. The lungs appear to be clear. Endotracheal   tube is in good position. No acute fractures could be identified. No   pneumothorax. If clinically warranted dedicated rib films should be   obtained.    < end of copied text >    < from: CT Maxillofacial No Cont (10.10.18 @ 12:58) >      IMPRESSION: No acute fractures or dislocations. Degenerative changes.    < end of copied text >    < from: CT Cervical Spine No Cont (10.10.18 @ 12:58) >  IMPRESSION: No acute fractures or dislocations. Degenerative changes.    < end of copied text >    < from: CT Head No Cont (10.10.18 @ 12:58) >  IMPRESSION: Age-appropriate involutional and ischemic gliotic changes.   Old right basal ganglia infarct with ex vacuo dilatation of the right   lateral ventricle unchanged since 4/13/2015. No facial fractures.     < end of copied text >

## 2018-10-10 NOTE — CONSULT NOTE ADULT - ASSESSMENT
73F Hx Afib on coumadin, CVA with residual right weakness presented after unwitnessed fall and seizure activity s/p intubation.     Neuro:  Seizure:  - CT head x 2 did not show any acute changes  - started on Keppra for seizure per neurology, rene recs  - EEG ordered  - Will check urine and blood tox, alcohol and salicylate level  - Propofol for sedation  - Tylenol IV     CV:  HTN:  - Patient currently hypertensive and has history of hypertension, SBP was 230s  - on Nicardipine ggt, with BP goal of 180/100 for now  - Will order TSH level and lipid profile  - trend trop    Respiratory:  - Intubated for airway protection in active seizure  - Continue mechanical ventilation  - Will trend ABG's    GI:  - NPO  - Will place OG tube  - Will start protonix after 48 hours  - CT abd was benign for acute trauma    Renal:  - Stict I's O's  - C/w soria  - Electrolyte WNL, will replete prn    Heme:  - Patient received K-centra and Vitamin K  - INR 2.5 -> 1.1    ID:  - Leukocytosis to 18.6, lactate 1.2  - BCx sent    Endo:  - uses insulin at home  - start SSI with q6hr finger sticks  - check A1C

## 2018-10-11 LAB
ALBUMIN SERPL ELPH-MCNC: 4 G/DL — SIGNIFICANT CHANGE UP (ref 3.3–5)
ALP SERPL-CCNC: 121 U/L — HIGH (ref 40–120)
ALT FLD-CCNC: 17 U/L — SIGNIFICANT CHANGE UP (ref 10–45)
ANION GAP SERPL CALC-SCNC: 13 MMOL/L — SIGNIFICANT CHANGE UP (ref 5–17)
APPEARANCE UR: CLEAR — SIGNIFICANT CHANGE UP
APTT BLD: 149.3 SEC — CRITICAL HIGH (ref 27.5–37.4)
APTT BLD: 27.6 SEC — SIGNIFICANT CHANGE UP (ref 27.5–37.4)
AST SERPL-CCNC: 26 U/L — SIGNIFICANT CHANGE UP (ref 10–40)
BASOPHILS # BLD AUTO: 0.1 K/UL — SIGNIFICANT CHANGE UP (ref 0–0.2)
BASOPHILS # BLD AUTO: 0.1 K/UL — SIGNIFICANT CHANGE UP (ref 0–0.2)
BASOPHILS NFR BLD AUTO: 0.3 % — SIGNIFICANT CHANGE UP (ref 0–2)
BASOPHILS NFR BLD AUTO: 0.5 % — SIGNIFICANT CHANGE UP (ref 0–2)
BILIRUB SERPL-MCNC: 0.7 MG/DL — SIGNIFICANT CHANGE UP (ref 0.2–1.2)
BILIRUB UR-MCNC: NEGATIVE — SIGNIFICANT CHANGE UP
BUN SERPL-MCNC: 10 MG/DL — SIGNIFICANT CHANGE UP (ref 7–23)
CALCIUM SERPL-MCNC: 8.4 MG/DL — SIGNIFICANT CHANGE UP (ref 8.4–10.5)
CHLORIDE SERPL-SCNC: 107 MMOL/L — SIGNIFICANT CHANGE UP (ref 96–108)
CHOLEST SERPL-MCNC: 179 MG/DL — SIGNIFICANT CHANGE UP (ref 10–199)
CO2 SERPL-SCNC: 22 MMOL/L — SIGNIFICANT CHANGE UP (ref 22–31)
COLOR SPEC: SIGNIFICANT CHANGE UP
CREAT SERPL-MCNC: 0.73 MG/DL — SIGNIFICANT CHANGE UP (ref 0.5–1.3)
DIFF PNL FLD: ABNORMAL
EOSINOPHIL # BLD AUTO: 0.1 K/UL — SIGNIFICANT CHANGE UP (ref 0–0.5)
EOSINOPHIL # BLD AUTO: 0.1 K/UL — SIGNIFICANT CHANGE UP (ref 0–0.5)
EOSINOPHIL NFR BLD AUTO: 0.4 % — SIGNIFICANT CHANGE UP (ref 0–6)
EOSINOPHIL NFR BLD AUTO: 0.7 % — SIGNIFICANT CHANGE UP (ref 0–6)
GLUCOSE BLDC GLUCOMTR-MCNC: 103 MG/DL — HIGH (ref 70–99)
GLUCOSE BLDC GLUCOMTR-MCNC: 125 MG/DL — HIGH (ref 70–99)
GLUCOSE BLDC GLUCOMTR-MCNC: 134 MG/DL — HIGH (ref 70–99)
GLUCOSE SERPL-MCNC: 154 MG/DL — HIGH (ref 70–99)
GLUCOSE UR QL: ABNORMAL
HCT VFR BLD CALC: 39.8 % — SIGNIFICANT CHANGE UP (ref 34.5–45)
HCT VFR BLD CALC: 45 % — SIGNIFICANT CHANGE UP (ref 34.5–45)
HDLC SERPL-MCNC: 32 MG/DL — LOW
HGB BLD-MCNC: 13.5 G/DL — SIGNIFICANT CHANGE UP (ref 11.5–15.5)
HGB BLD-MCNC: 15.3 G/DL — SIGNIFICANT CHANGE UP (ref 11.5–15.5)
INR BLD: 1.04 RATIO — SIGNIFICANT CHANGE UP (ref 0.88–1.16)
INR BLD: 1.13 RATIO — SIGNIFICANT CHANGE UP (ref 0.88–1.16)
KETONES UR-MCNC: ABNORMAL
LEUKOCYTE ESTERASE UR-ACNC: NEGATIVE — SIGNIFICANT CHANGE UP
LIPID PNL WITH DIRECT LDL SERPL: SIGNIFICANT CHANGE UP
LYMPHOCYTES # BLD AUTO: 1.6 K/UL — SIGNIFICANT CHANGE UP (ref 1–3.3)
LYMPHOCYTES # BLD AUTO: 10.5 % — LOW (ref 13–44)
LYMPHOCYTES # BLD AUTO: 13.9 % — SIGNIFICANT CHANGE UP (ref 13–44)
LYMPHOCYTES # BLD AUTO: 2.1 K/UL — SIGNIFICANT CHANGE UP (ref 1–3.3)
MAGNESIUM SERPL-MCNC: 2.8 MG/DL — HIGH (ref 1.6–2.6)
MCHC RBC-ENTMCNC: 31.7 PG — SIGNIFICANT CHANGE UP (ref 27–34)
MCHC RBC-ENTMCNC: 32.1 PG — SIGNIFICANT CHANGE UP (ref 27–34)
MCHC RBC-ENTMCNC: 33.9 GM/DL — SIGNIFICANT CHANGE UP (ref 32–36)
MCHC RBC-ENTMCNC: 34 GM/DL — SIGNIFICANT CHANGE UP (ref 32–36)
MCV RBC AUTO: 93.6 FL — SIGNIFICANT CHANGE UP (ref 80–100)
MCV RBC AUTO: 94.3 FL — SIGNIFICANT CHANGE UP (ref 80–100)
MONOCYTES # BLD AUTO: 1.2 K/UL — HIGH (ref 0–0.9)
MONOCYTES # BLD AUTO: 1.4 K/UL — HIGH (ref 0–0.9)
MONOCYTES NFR BLD AUTO: 8 % — SIGNIFICANT CHANGE UP (ref 2–14)
MONOCYTES NFR BLD AUTO: 9.6 % — SIGNIFICANT CHANGE UP (ref 2–14)
NEUTROPHILS # BLD AUTO: 11.3 K/UL — HIGH (ref 1.8–7.4)
NEUTROPHILS # BLD AUTO: 12.4 K/UL — HIGH (ref 1.8–7.4)
NEUTROPHILS NFR BLD AUTO: 75.5 % — SIGNIFICANT CHANGE UP (ref 43–77)
NEUTROPHILS NFR BLD AUTO: 80.5 % — HIGH (ref 43–77)
NITRITE UR-MCNC: NEGATIVE — SIGNIFICANT CHANGE UP
PH UR: 6.5 — SIGNIFICANT CHANGE UP (ref 5–8)
PHOSPHATE SERPL-MCNC: 4.8 MG/DL — HIGH (ref 2.5–4.5)
PLATELET # BLD AUTO: 278 K/UL — SIGNIFICANT CHANGE UP (ref 150–400)
PLATELET # BLD AUTO: 351 K/UL — SIGNIFICANT CHANGE UP (ref 150–400)
POTASSIUM SERPL-MCNC: 5.2 MMOL/L — SIGNIFICANT CHANGE UP (ref 3.5–5.3)
POTASSIUM SERPL-SCNC: 5.2 MMOL/L — SIGNIFICANT CHANGE UP (ref 3.5–5.3)
PROT SERPL-MCNC: 7.4 G/DL — SIGNIFICANT CHANGE UP (ref 6–8.3)
PROT UR-MCNC: ABNORMAL
PROTHROM AB SERPL-ACNC: 11.3 SEC — SIGNIFICANT CHANGE UP (ref 9.8–12.7)
PROTHROM AB SERPL-ACNC: 12.3 SEC — SIGNIFICANT CHANGE UP (ref 9.8–12.7)
RBC # BLD: 4.22 M/UL — SIGNIFICANT CHANGE UP (ref 3.8–5.2)
RBC # BLD: 4.81 M/UL — SIGNIFICANT CHANGE UP (ref 3.8–5.2)
RBC # FLD: 11.3 % — SIGNIFICANT CHANGE UP (ref 10.3–14.5)
RBC # FLD: 11.5 % — SIGNIFICANT CHANGE UP (ref 10.3–14.5)
SODIUM SERPL-SCNC: 142 MMOL/L — SIGNIFICANT CHANGE UP (ref 135–145)
SP GR SPEC: 1.02 — SIGNIFICANT CHANGE UP (ref 1.01–1.02)
TOTAL CHOLESTEROL/HDL RATIO MEASUREMENT: 5.6 RATIO — SIGNIFICANT CHANGE UP (ref 3.3–7.1)
TRIGL SERPL-MCNC: 3027 MG/DL — HIGH (ref 10–149)
TSH SERPL-MCNC: 3.63 UIU/ML — SIGNIFICANT CHANGE UP (ref 0.27–4.2)
UROBILINOGEN FLD QL: NEGATIVE — SIGNIFICANT CHANGE UP
WBC # BLD: 15 K/UL — HIGH (ref 3.8–10.5)
WBC # BLD: 15.4 K/UL — HIGH (ref 3.8–10.5)
WBC # FLD AUTO: 15 K/UL — HIGH (ref 3.8–10.5)
WBC # FLD AUTO: 15.4 K/UL — HIGH (ref 3.8–10.5)

## 2018-10-11 PROCEDURE — 71045 X-RAY EXAM CHEST 1 VIEW: CPT | Mod: 26

## 2018-10-11 PROCEDURE — 95951: CPT | Mod: 26

## 2018-10-11 PROCEDURE — 93306 TTE W/DOPPLER COMPLETE: CPT | Mod: 26

## 2018-10-11 PROCEDURE — 99223 1ST HOSP IP/OBS HIGH 75: CPT | Mod: GC

## 2018-10-11 RX ORDER — HEPARIN SODIUM 5000 [USP'U]/ML
5000 INJECTION INTRAVENOUS; SUBCUTANEOUS EVERY 12 HOURS
Qty: 0 | Refills: 0 | Status: DISCONTINUED | OUTPATIENT
Start: 2018-10-11 | End: 2018-10-11

## 2018-10-11 RX ORDER — HEPARIN SODIUM 5000 [USP'U]/ML
700 INJECTION INTRAVENOUS; SUBCUTANEOUS
Qty: 25000 | Refills: 0 | Status: DISCONTINUED | OUTPATIENT
Start: 2018-10-11 | End: 2018-10-12

## 2018-10-11 RX ORDER — POLYETHYLENE GLYCOL 3350 17 G/17G
17 POWDER, FOR SOLUTION ORAL DAILY
Qty: 0 | Refills: 0 | Status: DISCONTINUED | OUTPATIENT
Start: 2018-10-11 | End: 2018-10-14

## 2018-10-11 RX ORDER — WARFARIN SODIUM 2.5 MG/1
2 TABLET ORAL ONCE
Qty: 0 | Refills: 0 | Status: DISCONTINUED | OUTPATIENT
Start: 2018-10-11 | End: 2018-10-11

## 2018-10-11 RX ORDER — HEPARIN SODIUM 5000 [USP'U]/ML
1500 INJECTION INTRAVENOUS; SUBCUTANEOUS EVERY 6 HOURS
Qty: 0 | Refills: 0 | Status: DISCONTINUED | OUTPATIENT
Start: 2018-10-11 | End: 2018-10-12

## 2018-10-11 RX ORDER — HEPARIN SODIUM 5000 [USP'U]/ML
INJECTION INTRAVENOUS; SUBCUTANEOUS
Qty: 25000 | Refills: 0 | Status: DISCONTINUED | OUTPATIENT
Start: 2018-10-11 | End: 2018-10-11

## 2018-10-11 RX ORDER — SODIUM CHLORIDE 9 MG/ML
1000 INJECTION, SOLUTION INTRAVENOUS
Qty: 0 | Refills: 0 | Status: DISCONTINUED | OUTPATIENT
Start: 2018-10-11 | End: 2018-10-11

## 2018-10-11 RX ORDER — PROPOFOL 10 MG/ML
5 INJECTION, EMULSION INTRAVENOUS
Qty: 500 | Refills: 0 | Status: DISCONTINUED | OUTPATIENT
Start: 2018-10-11 | End: 2018-10-15

## 2018-10-11 RX ORDER — ACETAMINOPHEN 500 MG
650 TABLET ORAL EVERY 6 HOURS
Qty: 0 | Refills: 0 | Status: DISCONTINUED | OUTPATIENT
Start: 2018-10-11 | End: 2018-10-31

## 2018-10-11 RX ORDER — SODIUM CHLORIDE 9 MG/ML
1000 INJECTION INTRAMUSCULAR; INTRAVENOUS; SUBCUTANEOUS
Qty: 0 | Refills: 0 | Status: DISCONTINUED | OUTPATIENT
Start: 2018-10-11 | End: 2018-10-11

## 2018-10-11 RX ORDER — PIPERACILLIN AND TAZOBACTAM 4; .5 G/20ML; G/20ML
3.38 INJECTION, POWDER, LYOPHILIZED, FOR SOLUTION INTRAVENOUS EVERY 8 HOURS
Qty: 0 | Refills: 0 | Status: DISCONTINUED | OUTPATIENT
Start: 2018-10-11 | End: 2018-10-12

## 2018-10-11 RX ORDER — SENNA PLUS 8.6 MG/1
10 TABLET ORAL AT BEDTIME
Qty: 0 | Refills: 0 | Status: DISCONTINUED | OUTPATIENT
Start: 2018-10-11 | End: 2018-10-14

## 2018-10-11 RX ORDER — HEPARIN SODIUM 5000 [USP'U]/ML
3500 INJECTION INTRAVENOUS; SUBCUTANEOUS EVERY 6 HOURS
Qty: 0 | Refills: 0 | Status: DISCONTINUED | OUTPATIENT
Start: 2018-10-11 | End: 2018-10-12

## 2018-10-11 RX ORDER — HEPARIN SODIUM 5000 [USP'U]/ML
1500 INJECTION INTRAVENOUS; SUBCUTANEOUS EVERY 6 HOURS
Qty: 0 | Refills: 0 | Status: DISCONTINUED | OUTPATIENT
Start: 2018-10-11 | End: 2018-10-11

## 2018-10-11 RX ORDER — HEPARIN SODIUM 5000 [USP'U]/ML
3500 INJECTION INTRAVENOUS; SUBCUTANEOUS EVERY 6 HOURS
Qty: 0 | Refills: 0 | Status: DISCONTINUED | OUTPATIENT
Start: 2018-10-11 | End: 2018-10-11

## 2018-10-11 RX ADMIN — HEPARIN SODIUM 700 UNIT(S)/HR: 5000 INJECTION INTRAVENOUS; SUBCUTANEOUS at 17:24

## 2018-10-11 RX ADMIN — DEXMEDETOMIDINE HYDROCHLORIDE IN 0.9% SODIUM CHLORIDE 2.14 MICROGRAM(S)/KG/HR: 4 INJECTION INTRAVENOUS at 21:05

## 2018-10-11 RX ADMIN — Medication 650 MILLIGRAM(S): at 20:00

## 2018-10-11 RX ADMIN — Medication 650 MILLIGRAM(S): at 19:07

## 2018-10-11 RX ADMIN — PIPERACILLIN AND TAZOBACTAM 25 GRAM(S): 4; .5 INJECTION, POWDER, LYOPHILIZED, FOR SOLUTION INTRAVENOUS at 22:06

## 2018-10-11 RX ADMIN — LEVETIRACETAM 400 MILLIGRAM(S): 250 TABLET, FILM COATED ORAL at 17:25

## 2018-10-11 RX ADMIN — HEPARIN SODIUM 800 UNIT(S)/HR: 5000 INJECTION INTRAVENOUS; SUBCUTANEOUS at 08:50

## 2018-10-11 RX ADMIN — SENNA PLUS 10 MILLILITER(S): 8.6 TABLET ORAL at 21:05

## 2018-10-11 RX ADMIN — LEVETIRACETAM 400 MILLIGRAM(S): 250 TABLET, FILM COATED ORAL at 05:13

## 2018-10-11 NOTE — PROGRESS NOTE ADULT - ASSESSMENT
Patient is a 72 yo F with PMHx of COPD, Bronchitis, Afib (on home coumadin), Right basal ganglion infarct (with resulting dysphagia s/p PEG and residual left spastic hemiparesis) who presented to the ED after unwitnessed fall and possible seizure activity s/p intubation.     #Neuro  Seizure  - patient with generalized tremors and subsequent confusion prior to presentation  - CT head x 2 did not show any acute changes  - started on Keppra for seizure prophylaxis per neurology  - c/w vEEG. Will f/u report   - Utox, alcohol and salicylate levels all negative   - will obtain MRI brain w/o con, and MRA head w/o cont, and MRA neck with con    #Resp  - Intubated for airway protection in setting of possible seizure  - Continue mechanical ventilation  COPD  - Duonebs q6 PRN    #CV  HTN  - Patient with history of hypertension, with SBP in the 220s on admission  - currently on Phenylephrine with goal SBP of 160  - Will obtain CE, TSH level, and lipid profile  Atrial Fibrillation  - [id number] of 6  - Coumadin reversed in the ED with K-centra and VitK in the setting of possible trauma  - will hold Heparin gtt for now, and start in the AM  - patient currently not rate controlled. Will give Dilt PRN for HR >120  - will obtain TTE    #GI  - CT abd was benign for acute trauma  - will start tube feeds     #Renal  - stable  - c/w soria  - monitor electrolytes    #Heme/Onc  - stable    #Endo  DM  - on home insulin    - will start low dose ISS with q6hr finger sticks  - check A1C    #ID  Leukocytosis  - Leukocytosis to 18.6  - CXR negative for consolidations, UA negative  - likely reactive  - will check blood cx     #DVT ppx  - previously anticoagulated with Warfarin Patient is a 74 yo F with PMHx of COPD, Bronchitis, Afib (on home coumadin), Right basal ganglion infarct (with resulting dysphagia s/p PEG and residual left spastic hemiparesis) who presented to the ED after unwitnessed fall and possible seizure activity s/p intubation.     #Neuro  Seizure  - patient with generalized tremors and subsequent confusion prior to presentation  - CT head x 2 did not show any acute changes  - started on Keppra for seizure prophylaxis per neurology  - c/w vEEG. Will f/u report   - Utox, alcohol and salicylate levels all negative   - will obtain MRI brain w/o con, and MRA head w/o cont, and MRA neck with con    #Resp  - Intubated for airway protection in setting of possible seizure  - Continue mechanical ventilation  COPD  - Duonebs q6 PRN    #CV  HTN  - Patient with history of hypertension, with SBP in the 220s on admission  - currently on Phenylephrine due to BP below goals, currently with goal SBP of 160  - will continue to monitor   Atrial Fibrillation  - [id number] of 6  - Coumadin reversed in the ED with K-centra and VitK in the setting of possible trauma  - will start Hep gtt   - will obtain TTE    #GI  - CT abd was benign for acute trauma  - will start tube feeds     #Renal  - stable  - c/w soria  - monitor electrolytes    #Heme/Onc  - stable    #Endo  DM  - FS WNL  - continue low dose ISS with q6hr finger sticks  - check A1C    #ID  Leukocytosis  - Neutrophil predominant leukocytosis at 18.6 on admission  - now improving  - likely reactive  - CXR negative for consolidations, UA negative  - will check blood cx     #DVT ppx  - will start Hep gtt and bridge to Warfarin

## 2018-10-11 NOTE — PROGRESS NOTE ADULT - SUBJECTIVE AND OBJECTIVE BOX
*******************************  Chao Oly, PGY-2  Pager [phone]  *******************************    INTERVAL HPI/OVERNIGHT EVENTS:    SUBJECTIVE: Patient seen and examined at bedside.     CONSTITUTIONAL: No weakness, fevers or chills  EYES/ENT: No visual changes;  No vertigo or throat pain   NECK: No pain or stiffness  RESPIRATORY: No cough, wheezing, hemoptysis; No shortness of breath  CARDIOVASCULAR: No chest pain or palpitations  GASTROINTESTINAL: No abdominal or epigastric pain. No nausea, vomiting, or hematemesis; No diarrhea or constipation. No melena or hematochezia.  GENITOURINARY: No dysuria, frequency or hematuria  NEUROLOGICAL: No numbness or weakness  SKIN: No itching, rashes    OBJECTIVE:    VITAL SIGNS:  ICU Vital Signs Last 24 Hrs  T(C): 37 (11 Oct 2018 07:30), Max: 37.4 (11 Oct 2018 04:00)  T(F): 98.6 (11 Oct 2018 07:30), Max: 99.3 (11 Oct 2018 04:00)  HR: 70 (11 Oct 2018 07:45) (60 - 140)  BP: 162/83 (11 Oct 2018 07:45) (89/44 - 226/123)  BP(mean): 114 (11 Oct 2018 07:45) (64 - 162)  ABP: --  ABP(mean): --  RR: 15 (11 Oct 2018 07:45) (14 - 39)  SpO2: 100% (11 Oct 2018 07:45) (95% - 100%)    Mode: AC/ CMV (Assist Control/ Continuous Mandatory Ventilation), RR (machine): 14, TV (machine): 450, FiO2: 30, PEEP: 5, ITime: 1, MAP: 9, PIP: 25    10-10 @ 07:01  -  10-11 @ 07:00  --------------------------------------------------------  IN: 2279.7 mL / OUT: 2450 mL / NET: -170.3 mL    10-11 @ 07:01  -  10-11 @ 08:21  --------------------------------------------------------  IN: 92.7 mL / OUT: 75 mL / NET: 17.7 mL      CAPILLARY BLOOD GLUCOSE      POCT Blood Glucose.: 134 mg/dL (11 Oct 2018 05:33)      PHYSICAL EXAM:    General: NAD  HEENT: NC/AT; PERRL, clear conjunctiva  Neck: supple  Respiratory: CTA b/l  Cardiovascular: +S1/S2; RRR  Abdomen: soft, NT/ND; +BS x4  Extremities: WWP, 2+ peripheral pulses b/l; no LE edema  Skin: normal color and turgor; no rash  Neurological:    MEDICATIONS:  MEDICATIONS  (STANDING):  buPROPion XL . 150 milliGRAM(s) Oral <User Schedule>  dexmedetomidine Infusion 0.2 MICROgram(s)/kG/Hr (2.14 mL/Hr) IV Continuous <Continuous>  dextrose 5%. 1000 milliLiter(s) (50 mL/Hr) IV Continuous <Continuous>  dextrose 50% Injectable 12.5 Gram(s) IV Push once  dextrose 50% Injectable 25 Gram(s) IV Push once  dextrose 50% Injectable 25 Gram(s) IV Push once  heparin  Infusion.  Unit(s)/Hr (8 mL/Hr) IV Continuous <Continuous>  insulin lispro (HumaLOG) corrective regimen sliding scale   SubCutaneous every 6 hours  levETIRAcetam  IVPB 500 milliGRAM(s) IV Intermittent every 12 hours  multivitamin/thiamine/folic acid in sodium chloride 0.9% 1000 milliLiter(s) (75 mL/Hr) IV Continuous <Continuous>  phenylephrine    Infusion 0.4 MICROgram(s)/kG/Min (3.21 mL/Hr) IV Continuous <Continuous>  propofol Infusion 10 MICROgram(s)/kG/Min (2.586 mL/Hr) IV Continuous <Continuous>  sodium chloride 0.9%. 1000 milliLiter(s) (75 mL/Hr) IV Continuous <Continuous>    MEDICATIONS  (PRN):  ALBUTerol/ipratropium for Nebulization 3 milliLiter(s) Nebulizer every 6 hours PRN Shortness of Breath and/or Wheezing  dextrose 40% Gel 15 Gram(s) Oral once PRN Blood Glucose LESS THAN 70 milliGRAM(s)/deciLiter  glucagon  Injectable 1 milliGRAM(s) IntraMuscular once PRN Glucose <70 milliGRAM(s)/deciLiter  heparin  Injectable 3500 Unit(s) IV Push every 6 hours PRN For aPTT less than 40  heparin  Injectable 1500 Unit(s) IV Push every 6 hours PRN For aPTT between 40 - 57  LORazepam   Injectable 2 milliGRAM(s) IV Push every 6 hours PRN Agitation      ALLERGIES:  Allergies    No Known Allergies    Intolerances        LABS:                        15.3   15.0  )-----------( 351      ( 11 Oct 2018 01:26 )             45.0     10-11    142  |  107  |  10  ----------------------------<  154<H>  5.2   |  22  |  0.73    Ca    8.4      11 Oct 2018 01:26  Phos  4.8     10-11  Mg     2.8     10-11    TPro  7.4  /  Alb  4.0  /  TBili  0.7  /  DBili  x   /  AST  26  /  ALT  17  /  AlkPhos  121<H>  10-11    PT/INR - ( 11 Oct 2018 01:26 )   PT: 11.3 sec;   INR: 1.04 ratio         PTT - ( 11 Oct 2018 01:26 )  PTT:27.6 sec  Urinalysis Basic - ( 10 Oct 2018 16:27 )    Color: Colorless / Appearance: Clear / S.020 / pH: x  Gluc: x / Ketone: Negative  / Bili: Negative / Urobili: Negative   Blood: x / Protein: Trace / Nitrite: Negative   Leuk Esterase: Negative / RBC: 2 /hpf / WBC 0 /hpf   Sq Epi: x / Non Sq Epi: 0 /hpf / Bacteria: Negative        RADIOLOGY & ADDITIONAL TESTS: Reviewed. *******************************  Chao Oly, PGY-2  Pager [phone]  *******************************    INTERVAL HPI/OVERNIGHT EVENTS:    SUBJECTIVE: Patient seen and examined at bedside.     ROS could not be assessed as patient is intubated and sedated    OBJECTIVE:    VITAL SIGNS:  ICU Vital Signs Last 24 Hrs  T(C): 37 (11 Oct 2018 07:30), Max: 37.4 (11 Oct 2018 04:00)  T(F): 98.6 (11 Oct 2018 07:30), Max: 99.3 (11 Oct 2018 04:00)  HR: 70 (11 Oct 2018 07:45) (60 - 140)  BP: 162/83 (11 Oct 2018 07:45) (89/44 - 226/123)  BP(mean): 114 (11 Oct 2018 07:45) (64 - 162)  ABP: --  ABP(mean): --  RR: 15 (11 Oct 2018 07:45) (14 - 39)  SpO2: 100% (11 Oct 2018 07:45) (95% - 100%)    Mode: AC/ CMV (Assist Control/ Continuous Mandatory Ventilation), RR (machine): 14, TV (machine): 450, FiO2: 30, PEEP: 5, ITime: 1, MAP: 9, PIP: 25    10-10 @ :01  -  10-11 @ 07:00  --------------------------------------------------------  IN: 2279.7 mL / OUT: 2450 mL / NET: -170.3 mL    10-11 @ :01  -  10-11 @ 08:21  --------------------------------------------------------  IN: 92.7 mL / OUT: 75 mL / NET: 17.7 mL      CAPILLARY BLOOD GLUCOSE      POCT Blood Glucose.: 134 mg/dL (11 Oct 2018 05:33)        PHYSICAL EXAM:  GENERAL: NAD, well-developed  HEAD:  Atraumatic, Normocephalic  EYES: EOMI, PERRLA, conjunctiva and sclera clear  NECK: Supple, No JVD  CHEST/LUNG: Clear to auscultation bilaterally; No wheeze  HEART: Irregularly irregular rhythm; No murmurs, rubs, or gallops  ABDOMEN: Soft, Nontender, Nondistended; Bowel sounds present  EXTREMITIES:  2+ Peripheral Pulses, No clubbing, cyanosis, or edema  PSYCH: AAOx3  NEUROLOGY: non-focal  SKIN: No rashes or lesions      MEDICATIONS:  MEDICATIONS  (STANDING):  buPROPion XL . 150 milliGRAM(s) Oral <User Schedule>  dexmedetomidine Infusion 0.2 MICROgram(s)/kG/Hr (2.14 mL/Hr) IV Continuous <Continuous>  dextrose 5%. 1000 milliLiter(s) (50 mL/Hr) IV Continuous <Continuous>  dextrose 50% Injectable 12.5 Gram(s) IV Push once  dextrose 50% Injectable 25 Gram(s) IV Push once  dextrose 50% Injectable 25 Gram(s) IV Push once  heparin  Infusion.  Unit(s)/Hr (8 mL/Hr) IV Continuous <Continuous>  insulin lispro (HumaLOG) corrective regimen sliding scale   SubCutaneous every 6 hours  levETIRAcetam  IVPB 500 milliGRAM(s) IV Intermittent every 12 hours  multivitamin/thiamine/folic acid in sodium chloride 0.9% 1000 milliLiter(s) (75 mL/Hr) IV Continuous <Continuous>  phenylephrine    Infusion 0.4 MICROgram(s)/kG/Min (3.21 mL/Hr) IV Continuous <Continuous>  propofol Infusion 10 MICROgram(s)/kG/Min (2.586 mL/Hr) IV Continuous <Continuous>  sodium chloride 0.9%. 1000 milliLiter(s) (75 mL/Hr) IV Continuous <Continuous>    MEDICATIONS  (PRN):  ALBUTerol/ipratropium for Nebulization 3 milliLiter(s) Nebulizer every 6 hours PRN Shortness of Breath and/or Wheezing  dextrose 40% Gel 15 Gram(s) Oral once PRN Blood Glucose LESS THAN 70 milliGRAM(s)/deciLiter  glucagon  Injectable 1 milliGRAM(s) IntraMuscular once PRN Glucose <70 milliGRAM(s)/deciLiter  heparin  Injectable 3500 Unit(s) IV Push every 6 hours PRN For aPTT less than 40  heparin  Injectable 1500 Unit(s) IV Push every 6 hours PRN For aPTT between 40 - 57  LORazepam   Injectable 2 milliGRAM(s) IV Push every 6 hours PRN Agitation      ALLERGIES:  Allergies    No Known Allergies    Intolerances        LABS:                        15.3   15.0  )-----------( 351      ( 11 Oct 2018 01:26 )             45.0     10-11    142  |  107  |  10  ----------------------------<  154<H>  5.2   |  22  |  0.73    Ca    8.4      11 Oct 2018 01:26  Phos  4.8     10-11  Mg     2.8     10-11    TPro  7.4  /  Alb  4.0  /  TBili  0.7  /  DBili  x   /  AST  26  /  ALT  17  /  AlkPhos  121<H>  10-11    PT/INR - ( 11 Oct 2018 01:26 )   PT: 11.3 sec;   INR: 1.04 ratio         PTT - ( 11 Oct 2018 01:26 )  PTT:27.6 sec  Urinalysis Basic - ( 10 Oct 2018 16:27 )    Color: Colorless / Appearance: Clear / S.020 / pH: x  Gluc: x / Ketone: Negative  / Bili: Negative / Urobili: Negative   Blood: x / Protein: Trace / Nitrite: Negative   Leuk Esterase: Negative / RBC: 2 /hpf / WBC 0 /hpf   Sq Epi: x / Non Sq Epi: 0 /hpf / Bacteria: Negative        RADIOLOGY & ADDITIONAL TESTS: Reviewed.

## 2018-10-11 NOTE — PROGRESS NOTE ADULT - ATTENDING COMMENTS
prior CVA/ new onset SZ/ resp failure  C/W AC ventilation. Can keep  or greater for possible acute CVA thou head CT neg x2, MRI brain today. AV EEG today. C/W Keppra 500 Q12H, Can wean propofol and wake up. Use Dex if needed. Heparin gtt for Afib. Neuro F/U appd cleared by trauma service.

## 2018-10-11 NOTE — EEG REPORT - NS EEG TEXT BOX
[facility]   COMPREHENSIVE EPILEPSY CENTER   REPORT OF ROUTINE VIDEO EEG     [facility]: [address] [place] [address] , [address], [place] [address], Ph#: [phone]  [facility]: [address], [place] [address], Ph#: [phone]  Office: [address] [place] [address], [facility] [address], [place] [address] Ph#: [phone]    Patient Name: DELTA WADE  Age and : 73y ([birth year])  MRN #: [id number]  Location: [facility] 517 W1  Referring Physician: Asher Elliott    Study Date: 10-10-18     _____________________________________________________________  TECHNICAL INFORMATION    Placement and Labeling of Electrodes:  The EEG was performed utilizing 20 channels referential EEG connections (coronal over temporal over parasagittal montage) using all standard 10-20 electrode placements with EKG.  Recording was at a sampling rate of 256 samples per second per channel.  Time synchronized digital video recording was done simultaneously with EEG recording.  A low light infrared camera was used for low light recording.  Adeel and seizure detection algorithms were utilized.    _____________________________________________________________  HISTORY    Patient is a 73y old  Female who presents with a chief complaint of Fall, AMS (11 Oct 2018 08:21)      PERTINENT MEDICATION:  levETIRAcetam  IVPB 500 milliGRAM(s) IV Intermittent every 12 hours  propofol Infusion 10 MICROgram(s)/kG/Min (2.586 mL/Hr) IV Continuous <Continuous>    _____________________________________________________________  STUDY INTERPRETATION    Findings: The background was continuous, spontaneously variable and non-reactive. Entire recording captured drowsiness and sleep. No posterior dominant rhythm seen.    Focal Slowing:   None were present.    Sleep Background:  Drowsiness was characterized by fragmentation, attenuation, and slowing of the background activity.    Sleep was characterized by the presence of asymmetric spindles and K-complexes better formed over the left hemisphere.    Other Non-Epileptiform Findings:  Attenuation of fast activity over the right hemisphere.     Interictal Epileptiform Activity:   None were present.    Events:  Clinical events: None recorded.  Seizures: None recorded.    Activation Procedures:   Hyperventilation was not performed.    Photic stimulation was not performed.     Artifacts:  Intermittent myogenic and movement artifacts were noted.    ECG:  The heart rate on single channel ECG was predominantly between 100-120 BPM.    _____________________________________________________________  EEG SUMMARY/CLASSIFICATION    Abnormal EEG in a sedated patient.  - Attenuation of fast activity over the right hemisphere.   - Moderate to severe generalized slowing.    _____________________________________________________________  EEG IMPRESSION/CLINICAL CORRELATE    Abnormal EEG study.  1. Cortical injury in the right hemisphere.   2. Moderate to severe nonspecific diffuse or multifocal cerebral dysfunction.   3. No epileptiform pattern or seizure seen.      Shayla Sanchez MD  Attending Physician, [facility] Epilepsy [place]
[facility]   COMPREHENSIVE EPILEPSY CENTER   REPORT OF CONTINUOUS VIDEO EEG     [facility]: [address] [place] [address] , [address], [place] [address], Ph#: [phone]  [facility]: [address], [place] [address], Ph#: [phone]  Office: [address] [place] [address], [facility] [address], [place] [address] Ph#: [phone]    Patient Name: DELTA WADE  Age and : 73y (49)  MRN #: [id number]  Location: [facility] 517 W1  Referring Physician: Asher Elliott    Study Date: 10-10-18 - 10-11-18    _____________________________________________________________  HISTORY    Patient is a 73y old  Female who presents with a chief complaint of Fall, AMS (11 Oct 2018 08:21)      PERTINENT MEDICATION:  levETIRAcetam  IVPB 500 milliGRAM(s) IV Intermittent every 12 hours  propofol Infusion 10 MICROgram(s)/kG/Min (2.586 mL/Hr) IV Continuous <Continuous>    _____________________________________________________________  STUDY INTERPRETATION    Findings: The background was continuous, spontaneously variable and reactive. Background predominantly consisted of theta, delta and faster activities. No posterior dominant rhythm seen.    Focal Slowing:   Continuous theta/delta slowing in the right hemisphere.    Sleep Background:  Drowsiness was characterized by fragmentation, attenuation, and slowing of the background activity.    Sleep was characterized by the presence of asymmetric sleep spindles and K-complexes better formed over the left hemisphere.    Other Non-Epileptiform Findings:  Attenuation of fast activity over the right hemisphere.     Interictal Epileptiform Activity:   Frequent right anterior-mid temporal (F8/T8) spike-wave discharges.    Events:  Clinical events: None recorded.  Seizures: None recorded.    Activation Procedures:   Hyperventilation was not performed.    Photic stimulation was not performed.     Artifacts:  Intermittent myogenic and movement artifacts were noted.    ECG:  The heart rate on single channel ECG was predominantly between 100-120 BPM.    _____________________________________________________________  EEG SUMMARY/CLASSIFICATION    Abnormal EEG in a sedated patient.  - Frequent right anterior-mid temporal (F8/T8) spike-wave discharges .  - Continuous theta/delta slowing in the right hemisphere.  - Attenuation of fast activity over the right hemisphere.   - Moderate generalized slowing.    _____________________________________________________________  EEG IMPRESSION/CLINICAL CORRELATE    Abnormal EEG study.  1. Potential epileptogenic focus in the right anterior-mid temporal region.  2. Structural abnormality and cortical injury in the right hemisphere.   3. Moderate nonspecific diffuse or multifocal cerebral dysfunction.   4. No seizure seen.      Shayla Sanchez MD  Attending Physician, [facility] Epilepsy [place]
[facility]   COMPREHENSIVE EPILEPSY CENTER   REPORT OF CONTINUOUS VIDEO EEG     [facility]: [address] [place] [address] , [address], [place] [address], Ph#: [phone]  [facility]: [address], [place] [address], Ph#: [phone]  Office: [address] [place] [address], [facility] [address], [place] [address] Ph#: [phone]    Patient Name: DELTA WADE  Age and : 73y (49)  MRN #: [id number]  Location: [facility] 517 W1  Referring Physician: Asher Elliott    Study Date: 10-11-18    _____________________________________________________________  HISTORY    Patient is a 73y old  Female who presents with a chief complaint of Fall, AMS (11 Oct 2018 08:21)      PERTINENT MEDICATION:  levETIRAcetam  IVPB 500 milliGRAM(s) IV Intermittent every 12 hours  propofol Infusion 10 MICROgram(s)/kG/Min (2.586 mL/Hr) IV Continuous <Continuous>    _____________________________________________________________  STUDY INTERPRETATION    Findings: The background was continuous, spontaneously variable and reactive. Background predominantly consisted of theta, delta and faster activities. No posterior dominant rhythm seen.    Focal Slowing:   Continuous theta/delta slowing in the right hemisphere.    Sleep Background:  Drowsiness was characterized by fragmentation, attenuation, and slowing of the background activity.    Sleep was characterized by the presence of asymmetric sleep spindles and K-complexes better formed over the left hemisphere.    Other Non-Epileptiform Findings:  Attenuation of fast activity over the right hemisphere.     Interictal Epileptiform Activity:   Frequent right anterior-mid temporal (F8/T8) spike-wave discharges.    Events:  Clinical events: None recorded.  Seizures: None recorded.    Activation Procedures:   Hyperventilation was not performed.    Photic stimulation was not performed.     Artifacts:  Intermittent myogenic and movement artifacts were noted.    ECG:  The heart rate on single channel ECG was predominantly between 100-120 BPM.    _____________________________________________________________  EEG SUMMARY/CLASSIFICATION    Abnormal EEG in a sedated patient.  - Frequent right anterior-mid temporal (F8/T8) spike-wave discharges .  - Continuous theta/delta slowing in the right hemisphere.  - Attenuation of fast activity over the right hemisphere.   - Moderate generalized slowing.    _____________________________________________________________  EEG IMPRESSION/CLINICAL CORRELATE    Abnormal EEG study.  1. Potential epileptogenic focus in the right anterior-mid temporal region.  2. Structural abnormality and cortical injury in the right hemisphere.   3. Moderate nonspecific diffuse or multifocal cerebral dysfunction.   4. No seizure seen.      Shayla Sanchez MD  Attending Physician, [facility] Epilepsy [place]

## 2018-10-12 LAB
ALBUMIN SERPL ELPH-MCNC: 3 G/DL — LOW (ref 3.3–5)
ALBUMIN SERPL ELPH-MCNC: 3.3 G/DL — SIGNIFICANT CHANGE UP (ref 3.3–5)
ALP SERPL-CCNC: 105 U/L — SIGNIFICANT CHANGE UP (ref 40–120)
ALP SERPL-CCNC: 87 U/L — SIGNIFICANT CHANGE UP (ref 40–120)
ALT FLD-CCNC: 11 U/L — SIGNIFICANT CHANGE UP (ref 10–45)
ALT FLD-CCNC: 12 U/L — SIGNIFICANT CHANGE UP (ref 10–45)
ANION GAP SERPL CALC-SCNC: 10 MMOL/L — SIGNIFICANT CHANGE UP (ref 5–17)
ANION GAP SERPL CALC-SCNC: 11 MMOL/L — SIGNIFICANT CHANGE UP (ref 5–17)
APPEARANCE CSF: CLEAR — SIGNIFICANT CHANGE UP
APPEARANCE SPUN FLD: COLORLESS — SIGNIFICANT CHANGE UP
APTT BLD: 65.1 SEC — HIGH (ref 27.5–37.4)
APTT BLD: 89.5 SEC — HIGH (ref 27.5–37.4)
AST SERPL-CCNC: 17 U/L — SIGNIFICANT CHANGE UP (ref 10–40)
AST SERPL-CCNC: 24 U/L — SIGNIFICANT CHANGE UP (ref 10–40)
BASE EXCESS BLDV CALC-SCNC: -1.6 MMOL/L — SIGNIFICANT CHANGE UP (ref -2–2)
BASOPHILS # BLD AUTO: 0.1 K/UL — SIGNIFICANT CHANGE UP (ref 0–0.2)
BASOPHILS NFR BLD AUTO: 0.3 % — SIGNIFICANT CHANGE UP (ref 0–2)
BILIRUB SERPL-MCNC: 0.6 MG/DL — SIGNIFICANT CHANGE UP (ref 0.2–1.2)
BILIRUB SERPL-MCNC: 0.7 MG/DL — SIGNIFICANT CHANGE UP (ref 0.2–1.2)
BUN SERPL-MCNC: 13 MG/DL — SIGNIFICANT CHANGE UP (ref 7–23)
BUN SERPL-MCNC: 13 MG/DL — SIGNIFICANT CHANGE UP (ref 7–23)
CALCIUM SERPL-MCNC: 8.2 MG/DL — LOW (ref 8.4–10.5)
CALCIUM SERPL-MCNC: 8.3 MG/DL — LOW (ref 8.4–10.5)
CHLORIDE SERPL-SCNC: 109 MMOL/L — HIGH (ref 96–108)
CHLORIDE SERPL-SCNC: 110 MMOL/L — HIGH (ref 96–108)
CO2 BLDV-SCNC: 26 MMOL/L — SIGNIFICANT CHANGE UP (ref 22–30)
CO2 SERPL-SCNC: 19 MMOL/L — LOW (ref 22–31)
CO2 SERPL-SCNC: 22 MMOL/L — SIGNIFICANT CHANGE UP (ref 22–31)
COLOR CSF: ABNORMAL
CREAT SERPL-MCNC: 0.73 MG/DL — SIGNIFICANT CHANGE UP (ref 0.5–1.3)
CREAT SERPL-MCNC: 0.73 MG/DL — SIGNIFICANT CHANGE UP (ref 0.5–1.3)
EOSINOPHIL # BLD AUTO: 0.1 K/UL — SIGNIFICANT CHANGE UP (ref 0–0.5)
EOSINOPHIL NFR BLD AUTO: 0.8 % — SIGNIFICANT CHANGE UP (ref 0–6)
GAS PNL BLDA: SIGNIFICANT CHANGE UP
GAS PNL BLDV: SIGNIFICANT CHANGE UP
GLUCOSE BLDC GLUCOMTR-MCNC: 135 MG/DL — HIGH (ref 70–99)
GLUCOSE BLDC GLUCOMTR-MCNC: 149 MG/DL — HIGH (ref 70–99)
GLUCOSE CSF-MCNC: 105 MG/DL — HIGH (ref 40–70)
GLUCOSE SERPL-MCNC: 149 MG/DL — HIGH (ref 70–99)
GLUCOSE SERPL-MCNC: 162 MG/DL — HIGH (ref 70–99)
GRAM STN FLD: SIGNIFICANT CHANGE UP
GRAM STN FLD: SIGNIFICANT CHANGE UP
HCO3 BLDV-SCNC: 24 MMOL/L — SIGNIFICANT CHANGE UP (ref 21–29)
HCT VFR BLD CALC: 41.7 % — SIGNIFICANT CHANGE UP (ref 34.5–45)
HGB BLD-MCNC: 13.5 G/DL — SIGNIFICANT CHANGE UP (ref 11.5–15.5)
HOROWITZ INDEX BLDV+IHG-RTO: 60 — SIGNIFICANT CHANGE UP
LDH CSF L TO P-CCNC: 28 U/L — SIGNIFICANT CHANGE UP
LDH FLD-CCNC: 28 U/L — SIGNIFICANT CHANGE UP
LYMPHOCYTES # BLD AUTO: 14.2 % — SIGNIFICANT CHANGE UP (ref 13–44)
LYMPHOCYTES # BLD AUTO: 2.6 K/UL — SIGNIFICANT CHANGE UP (ref 1–3.3)
LYMPHOCYTES # CSF: 18 % — LOW (ref 40–80)
MAGNESIUM SERPL-MCNC: 2.2 MG/DL — SIGNIFICANT CHANGE UP (ref 1.6–2.6)
MCHC RBC-ENTMCNC: 30.6 PG — SIGNIFICANT CHANGE UP (ref 27–34)
MCHC RBC-ENTMCNC: 32.5 GM/DL — SIGNIFICANT CHANGE UP (ref 32–36)
MCV RBC AUTO: 94 FL — SIGNIFICANT CHANGE UP (ref 80–100)
MONOCYTES # BLD AUTO: 1.7 K/UL — HIGH (ref 0–0.9)
MONOCYTES NFR BLD AUTO: 9.2 % — SIGNIFICANT CHANGE UP (ref 2–14)
MONOS+MACROS NFR CSF: 17 % — SIGNIFICANT CHANGE UP (ref 15–45)
NEUTROPHILS # BLD AUTO: 13.9 K/UL — HIGH (ref 1.8–7.4)
NEUTROPHILS # CSF: 65 % — HIGH (ref 0–6)
NEUTROPHILS NFR BLD AUTO: 75.5 % — SIGNIFICANT CHANGE UP (ref 43–77)
NRBC NFR CSF: 2 /UL — SIGNIFICANT CHANGE UP (ref 0–5)
PCO2 BLDV: 47 MMHG — SIGNIFICANT CHANGE UP (ref 35–50)
PH BLDV: 7.33 — LOW (ref 7.35–7.45)
PHOSPHATE SERPL-MCNC: 2.7 MG/DL — SIGNIFICANT CHANGE UP (ref 2.5–4.5)
PLATELET # BLD AUTO: 281 K/UL — SIGNIFICANT CHANGE UP (ref 150–400)
PO2 BLDV: 41 MMHG — SIGNIFICANT CHANGE UP (ref 25–45)
POTASSIUM SERPL-MCNC: 3.6 MMOL/L — SIGNIFICANT CHANGE UP (ref 3.5–5.3)
POTASSIUM SERPL-MCNC: 3.7 MMOL/L — SIGNIFICANT CHANGE UP (ref 3.5–5.3)
POTASSIUM SERPL-SCNC: 3.6 MMOL/L — SIGNIFICANT CHANGE UP (ref 3.5–5.3)
POTASSIUM SERPL-SCNC: 3.7 MMOL/L — SIGNIFICANT CHANGE UP (ref 3.5–5.3)
PROT CSF-MCNC: 30 MG/DL — SIGNIFICANT CHANGE UP (ref 15–45)
PROT SERPL-MCNC: 6.1 G/DL — SIGNIFICANT CHANGE UP (ref 6–8.3)
PROT SERPL-MCNC: 6.8 G/DL — SIGNIFICANT CHANGE UP (ref 6–8.3)
RBC # BLD: 4.43 M/UL — SIGNIFICANT CHANGE UP (ref 3.8–5.2)
RBC # CSF: 850 /UL — HIGH (ref 0–0)
RBC # FLD: 11.4 % — SIGNIFICANT CHANGE UP (ref 10.3–14.5)
SAO2 % BLDV: 72 % — SIGNIFICANT CHANGE UP (ref 67–88)
SODIUM SERPL-SCNC: 139 MMOL/L — SIGNIFICANT CHANGE UP (ref 135–145)
SODIUM SERPL-SCNC: 142 MMOL/L — SIGNIFICANT CHANGE UP (ref 135–145)
SPECIMEN SOURCE: SIGNIFICANT CHANGE UP
SPECIMEN SOURCE: SIGNIFICANT CHANGE UP
TROPONIN T, HIGH SENSITIVITY RESULT: 28 NG/L — SIGNIFICANT CHANGE UP (ref 0–51)
TUBE TYPE: SIGNIFICANT CHANGE UP
WBC # BLD: 18.4 K/UL — HIGH (ref 3.8–10.5)
WBC # FLD AUTO: 18.4 K/UL — HIGH (ref 3.8–10.5)

## 2018-10-12 PROCEDURE — 70553 MRI BRAIN STEM W/O & W/DYE: CPT | Mod: 26

## 2018-10-12 PROCEDURE — 93010 ELECTROCARDIOGRAM REPORT: CPT

## 2018-10-12 PROCEDURE — 70549 MR ANGIOGRAPH NECK W/O&W/DYE: CPT | Mod: 26

## 2018-10-12 PROCEDURE — 71045 X-RAY EXAM CHEST 1 VIEW: CPT | Mod: 26,76

## 2018-10-12 PROCEDURE — 62270 DX LMBR SPI PNXR: CPT | Mod: GC

## 2018-10-12 PROCEDURE — 70548 MR ANGIOGRAPHY NECK W/DYE: CPT | Mod: 26

## 2018-10-12 RX ORDER — MIDAZOLAM HYDROCHLORIDE 1 MG/ML
4 INJECTION, SOLUTION INTRAMUSCULAR; INTRAVENOUS ONCE
Qty: 0 | Refills: 0 | Status: DISCONTINUED | OUTPATIENT
Start: 2018-10-12 | End: 2018-10-12

## 2018-10-12 RX ORDER — ACYCLOVIR SODIUM 500 MG
210 VIAL (EA) INTRAVENOUS ONCE
Qty: 0 | Refills: 0 | Status: COMPLETED | OUTPATIENT
Start: 2018-10-12 | End: 2018-10-12

## 2018-10-12 RX ORDER — HEPARIN SODIUM 5000 [USP'U]/ML
3500 INJECTION INTRAVENOUS; SUBCUTANEOUS EVERY 6 HOURS
Qty: 0 | Refills: 0 | Status: DISCONTINUED | OUTPATIENT
Start: 2018-10-12 | End: 2018-10-20

## 2018-10-12 RX ORDER — ACYCLOVIR SODIUM 500 MG
VIAL (EA) INTRAVENOUS
Qty: 0 | Refills: 0 | Status: DISCONTINUED | OUTPATIENT
Start: 2018-10-12 | End: 2018-10-13

## 2018-10-12 RX ORDER — NOREPINEPHRINE BITARTRATE/D5W 8 MG/250ML
0.05 PLASTIC BAG, INJECTION (ML) INTRAVENOUS
Qty: 8 | Refills: 0 | Status: DISCONTINUED | OUTPATIENT
Start: 2018-10-12 | End: 2018-10-14

## 2018-10-12 RX ORDER — CEFTRIAXONE 500 MG/1
INJECTION, POWDER, FOR SOLUTION INTRAMUSCULAR; INTRAVENOUS
Qty: 0 | Refills: 0 | Status: DISCONTINUED | OUTPATIENT
Start: 2018-10-12 | End: 2018-10-13

## 2018-10-12 RX ORDER — MIDAZOLAM HYDROCHLORIDE 1 MG/ML
2 INJECTION, SOLUTION INTRAMUSCULAR; INTRAVENOUS ONCE
Qty: 0 | Refills: 0 | Status: DISCONTINUED | OUTPATIENT
Start: 2018-10-12 | End: 2018-10-12

## 2018-10-12 RX ORDER — POTASSIUM CHLORIDE 20 MEQ
40 PACKET (EA) ORAL ONCE
Qty: 0 | Refills: 0 | Status: COMPLETED | OUTPATIENT
Start: 2018-10-12 | End: 2018-10-12

## 2018-10-12 RX ORDER — HEPARIN SODIUM 5000 [USP'U]/ML
1500 INJECTION INTRAVENOUS; SUBCUTANEOUS EVERY 6 HOURS
Qty: 0 | Refills: 0 | Status: DISCONTINUED | OUTPATIENT
Start: 2018-10-12 | End: 2018-10-20

## 2018-10-12 RX ORDER — CEFTRIAXONE 500 MG/1
2 INJECTION, POWDER, FOR SOLUTION INTRAMUSCULAR; INTRAVENOUS EVERY 24 HOURS
Qty: 0 | Refills: 0 | Status: DISCONTINUED | OUTPATIENT
Start: 2018-10-13 | End: 2018-10-13

## 2018-10-12 RX ORDER — PHENYLEPHRINE HYDROCHLORIDE 10 MG/ML
0.4 INJECTION INTRAVENOUS
Qty: 160 | Refills: 0 | Status: DISCONTINUED | OUTPATIENT
Start: 2018-10-12 | End: 2018-10-14

## 2018-10-12 RX ORDER — DEXMEDETOMIDINE HYDROCHLORIDE IN 0.9% SODIUM CHLORIDE 4 UG/ML
0.2 INJECTION INTRAVENOUS
Qty: 200 | Refills: 0 | Status: DISCONTINUED | OUTPATIENT
Start: 2018-10-12 | End: 2018-10-14

## 2018-10-12 RX ORDER — HALOPERIDOL DECANOATE 100 MG/ML
1 INJECTION INTRAMUSCULAR EVERY 6 HOURS
Qty: 0 | Refills: 0 | Status: DISCONTINUED | OUTPATIENT
Start: 2018-10-12 | End: 2018-10-24

## 2018-10-12 RX ORDER — HEPARIN SODIUM 5000 [USP'U]/ML
INJECTION INTRAVENOUS; SUBCUTANEOUS
Qty: 25000 | Refills: 0 | Status: DISCONTINUED | OUTPATIENT
Start: 2018-10-12 | End: 2018-10-13

## 2018-10-12 RX ORDER — VANCOMYCIN HCL 1 G
1000 VIAL (EA) INTRAVENOUS ONCE
Qty: 0 | Refills: 0 | Status: COMPLETED | OUTPATIENT
Start: 2018-10-12 | End: 2018-10-12

## 2018-10-12 RX ORDER — HEPARIN SODIUM 5000 [USP'U]/ML
3500 INJECTION INTRAVENOUS; SUBCUTANEOUS ONCE
Qty: 0 | Refills: 0 | Status: COMPLETED | OUTPATIENT
Start: 2018-10-12 | End: 2018-10-12

## 2018-10-12 RX ORDER — ACYCLOVIR SODIUM 500 MG
210 VIAL (EA) INTRAVENOUS EVERY 8 HOURS
Qty: 0 | Refills: 0 | Status: DISCONTINUED | OUTPATIENT
Start: 2018-10-12 | End: 2018-10-13

## 2018-10-12 RX ORDER — PHENYLEPHRINE HYDROCHLORIDE 10 MG/ML
0.4 INJECTION INTRAVENOUS
Qty: 160 | Refills: 0 | Status: DISCONTINUED | OUTPATIENT
Start: 2018-10-12 | End: 2018-10-12

## 2018-10-12 RX ORDER — CEFTRIAXONE 500 MG/1
2 INJECTION, POWDER, FOR SOLUTION INTRAMUSCULAR; INTRAVENOUS ONCE
Qty: 0 | Refills: 0 | Status: COMPLETED | OUTPATIENT
Start: 2018-10-12 | End: 2018-10-12

## 2018-10-12 RX ORDER — VANCOMYCIN HCL 1 G
1000 VIAL (EA) INTRAVENOUS EVERY 12 HOURS
Qty: 0 | Refills: 0 | Status: DISCONTINUED | OUTPATIENT
Start: 2018-10-12 | End: 2018-10-14

## 2018-10-12 RX ORDER — PANTOPRAZOLE SODIUM 20 MG/1
40 TABLET, DELAYED RELEASE ORAL DAILY
Qty: 0 | Refills: 0 | Status: DISCONTINUED | OUTPATIENT
Start: 2018-10-12 | End: 2018-10-12

## 2018-10-12 RX ADMIN — CEFTRIAXONE 100 GRAM(S): 500 INJECTION, POWDER, FOR SOLUTION INTRAMUSCULAR; INTRAVENOUS at 13:17

## 2018-10-12 RX ADMIN — LEVETIRACETAM 400 MILLIGRAM(S): 250 TABLET, FILM COATED ORAL at 18:55

## 2018-10-12 RX ADMIN — POLYETHYLENE GLYCOL 3350 17 GRAM(S): 17 POWDER, FOR SOLUTION ORAL at 13:17

## 2018-10-12 RX ADMIN — MIDAZOLAM HYDROCHLORIDE 2 MILLIGRAM(S): 1 INJECTION, SOLUTION INTRAMUSCULAR; INTRAVENOUS at 18:00

## 2018-10-12 RX ADMIN — HALOPERIDOL DECANOATE 1 MILLIGRAM(S): 100 INJECTION INTRAMUSCULAR at 11:26

## 2018-10-12 RX ADMIN — Medication 104.2 MILLIGRAM(S): at 17:49

## 2018-10-12 RX ADMIN — HEPARIN SODIUM 3500 UNIT(S): 5000 INJECTION INTRAVENOUS; SUBCUTANEOUS at 22:27

## 2018-10-12 RX ADMIN — Medication 250 MILLIGRAM(S): at 13:17

## 2018-10-12 RX ADMIN — PROPOFOL 1.28 MICROGRAM(S)/KG/MIN: 10 INJECTION, EMULSION INTRAVENOUS at 22:28

## 2018-10-12 RX ADMIN — Medication 4.01 MICROGRAM(S)/KG/MIN: at 22:28

## 2018-10-12 RX ADMIN — Medication 40 MILLIEQUIVALENT(S): at 05:45

## 2018-10-12 RX ADMIN — HEPARIN SODIUM 700 UNIT(S)/HR: 5000 INJECTION INTRAVENOUS; SUBCUTANEOUS at 01:33

## 2018-10-12 RX ADMIN — MIDAZOLAM HYDROCHLORIDE 2 MILLIGRAM(S): 1 INJECTION, SOLUTION INTRAMUSCULAR; INTRAVENOUS at 11:30

## 2018-10-12 RX ADMIN — MIDAZOLAM HYDROCHLORIDE 4 MILLIGRAM(S): 1 INJECTION, SOLUTION INTRAMUSCULAR; INTRAVENOUS at 15:00

## 2018-10-12 RX ADMIN — PIPERACILLIN AND TAZOBACTAM 25 GRAM(S): 4; .5 INJECTION, POWDER, LYOPHILIZED, FOR SOLUTION INTRAVENOUS at 05:46

## 2018-10-12 RX ADMIN — SENNA PLUS 10 MILLILITER(S): 8.6 TABLET ORAL at 22:27

## 2018-10-12 RX ADMIN — LEVETIRACETAM 400 MILLIGRAM(S): 250 TABLET, FILM COATED ORAL at 05:45

## 2018-10-12 RX ADMIN — HEPARIN SODIUM 800 UNIT(S)/HR: 5000 INJECTION INTRAVENOUS; SUBCUTANEOUS at 22:29

## 2018-10-12 NOTE — DIETITIAN INITIAL EVALUATION ADULT. - PHYSICAL APPEARANCE
unable to perform full NFPE due to pt's position in bed, medical equipment, noted moderate muscle loss in temporal area

## 2018-10-12 NOTE — DIETITIAN INITIAL EVALUATION ADULT. - PERTINENT MEDS FT
MEDICATIONS  (STANDING):  acyclovir IVPB      acyclovir IVPB 210 milliGRAM(s) IV Intermittent once  acyclovir IVPB 210 milliGRAM(s) IV Intermittent every 8 hours  cefTRIAXone   IVPB      dexmedetomidine Infusion 0.2 MICROgram(s)/kG/Hr (2.14 mL/Hr) IV Continuous <Continuous>  dextrose 5%. 1000 milliLiter(s) (50 mL/Hr) IV Continuous <Continuous>  dextrose 50% Injectable 12.5 Gram(s) IV Push once  dextrose 50% Injectable 25 Gram(s) IV Push once  dextrose 50% Injectable 25 Gram(s) IV Push once  levETIRAcetam  IVPB 500 milliGRAM(s) IV Intermittent every 12 hours  norepinephrine Infusion 0.05 MICROgram(s)/kG/Min (4.013 mL/Hr) IV Continuous <Continuous>  phenylephrine    Infusion 0.4 MICROgram(s)/kG/Min (3.21 mL/Hr) IV Continuous <Continuous>  piperacillin/tazobactam IVPB. 3.375 Gram(s) IV Intermittent every 8 hours  polyethylene glycol 3350 17 Gram(s) Oral daily  propofol Infusion 5 MICROgram(s)/kG/Min (1.284 mL/Hr) IV Continuous <Continuous>  senna Syrup 10 milliLiter(s) Oral at bedtime

## 2018-10-12 NOTE — PATIENT PROFILE ADULT - VISION (WITH CORRECTIVE LENSES IF THE PATIENT USUALLY WEARS THEM):
cannot look to the left/Partially impaired: cannot see medication labels or newsprint, but can see obstacles in path, and the surrounding layout; can count fingers at arm's length

## 2018-10-12 NOTE — DIETITIAN INITIAL EVALUATION ADULT. - NS AS NUTRI INTERV ENTERAL NUTRITION
Glucerna 1.2 50cc/hr x 18 hrs to provide 1080 kcals, 54 gm protein, 724cc free water for 25 kcals/kg, 1.2 gm protein/kg dosing wt 42.8kg

## 2018-10-12 NOTE — PROGRESS NOTE ADULT - ATTENDING COMMENTS
spiking fevers UA neg CXR neg does have some secretions and B line predominance. We D/Madhu the humaira which was keeping her  in setting of possible acute CVA, and she dropped her SBP to 40s, we restarted humaira and gave 300 mcg humaira push dose, BP recovered quickly to 188-200. For MRI today , C/w keppra 500 Q12 H spikes are possible epileptogenic focus but isn't actively seizing. May need LP given fevers and no other clear source will d/w neuro. Has a delirium can use haldol/dex to try to get off propofol

## 2018-10-12 NOTE — PROCEDURE NOTE - NSPROCDETAILS_GEN_ALL_CORE
CSF Obtained/location identified, draped/prepped, sterile technique used, needle inserted/introduced

## 2018-10-12 NOTE — DIETITIAN INITIAL EVALUATION ADULT. - ENERGY NEEDS
Ht: 61"  Wt:  94lb BMI: 17.8 kg/m2   IBW: 105 (+/-10%)     within IBW  Edema:  none       Skin: no pressure injuries documented

## 2018-10-12 NOTE — DIETITIAN INITIAL EVALUATION ADULT. - OTHER INFO
Pt seen for:  MICU Length Of Stay  Adm dx:  AMS,  ?seizure, possible aspiration PNA.    GI issues: no V/D  Last BM:  none since adm   Food allergies: NKFA    Vit/supplement PTA: none noted

## 2018-10-12 NOTE — PROGRESS NOTE ADULT - SUBJECTIVE AND OBJECTIVE BOX
*******************************  Chao Oly, PGY-2  Pager [phone]  *******************************    INTERVAL HPI/OVERNIGHT EVENTS: Overnight, patient started on Zosyn for suspected Aspiration PNA. Propofol restarted due to agitation while patient was on max doses of Precedex gtt.     SUBJECTIVE: Patient seen and examined at bedside.     ROS could not be assessed as patient is intubated and sedated     OBJECTIVE:    VITAL SIGNS:  ICU Vital Signs Last 24 Hrs  T(C): 37.5 (12 Oct 2018 04:00), Max: 38.8 (11 Oct 2018 16:00)  T(F): 99.5 (12 Oct 2018 04:00), Max: 101.8 (11 Oct 2018 16:00)  HR: 62 (12 Oct 2018 07:00) (59 - 105)  BP: 138/62 (12 Oct 2018 07:00) (98/55 - 191/90)  BP(mean): 86 (12 Oct 2018 07:00) (67 - 129)  ABP: --  ABP(mean): --  RR: 14 (12 Oct 2018 07:00) (14 - 44)  SpO2: 100% (12 Oct 2018 07:00) (87% - 100%)    Mode: AC/ CMV (Assist Control/ Continuous Mandatory Ventilation), RR (machine): 14, TV (machine): 450, FiO2: 30, PEEP: 5, ITime: 1, MAP: 9, PIP: 24    10- @ 07:01  -  10-12 @ 07:00  --------------------------------------------------------  IN: 1755.9 mL / OUT: 805 mL / NET: 950.9 mL      CAPILLARY BLOOD GLUCOSE      POCT Blood Glucose.: 135 mg/dL (12 Oct 2018 05:44)        PHYSICAL EXAM:  GENERAL: intubated and sedated   HEAD:  Atraumatic, Normocephalic  EYES: EOMI, PERRLA, conjunctiva and sclera clear  NECK: Supple, No JVD  CHEST/LUNG: Clear to auscultation bilaterally; No wheeze  HEART: Irregularly irregular rhythm; No murmurs, rubs, or gallops  ABDOMEN: Soft, Nontender, Nondistended; Bowel sounds present  EXTREMITIES:  2+ Peripheral Pulses, No clubbing, cyanosis, or edema  NEUROLOGY: non-focal  SKIN: No rashes or lesions        MEDICATIONS:  MEDICATIONS  (STANDING):  dexmedetomidine Infusion 0.2 MICROgram(s)/kG/Hr (2.14 mL/Hr) IV Continuous <Continuous>  dextrose 5%. 1000 milliLiter(s) (50 mL/Hr) IV Continuous <Continuous>  dextrose 50% Injectable 12.5 Gram(s) IV Push once  dextrose 50% Injectable 25 Gram(s) IV Push once  dextrose 50% Injectable 25 Gram(s) IV Push once  heparin  Infusion. 700 Unit(s)/Hr (7 mL/Hr) IV Continuous <Continuous>  insulin lispro (HumaLOG) corrective regimen sliding scale   SubCutaneous every 6 hours  levETIRAcetam  IVPB 500 milliGRAM(s) IV Intermittent every 12 hours  phenylephrine    Infusion 0.4 MICROgram(s)/kG/Min (3.21 mL/Hr) IV Continuous <Continuous>  piperacillin/tazobactam IVPB. 3.375 Gram(s) IV Intermittent every 8 hours  polyethylene glycol 3350 17 Gram(s) Oral daily  propofol Infusion 5 MICROgram(s)/kG/Min (1.284 mL/Hr) IV Continuous <Continuous>  senna Syrup 10 milliLiter(s) Oral at bedtime    MEDICATIONS  (PRN):  acetaminophen   Tablet .. 650 milliGRAM(s) Oral every 6 hours PRN Temp greater or equal to 38C (100.4F)  ALBUTerol/ipratropium for Nebulization 3 milliLiter(s) Nebulizer every 6 hours PRN Shortness of Breath and/or Wheezing  dextrose 40% Gel 15 Gram(s) Oral once PRN Blood Glucose LESS THAN 70 milliGRAM(s)/deciLiter  glucagon  Injectable 1 milliGRAM(s) IntraMuscular once PRN Glucose <70 milliGRAM(s)/deciLiter  heparin  Injectable 3500 Unit(s) IV Push every 6 hours PRN For aPTT less than 40  heparin  Injectable 1500 Unit(s) IV Push every 6 hours PRN For aPTT between 40 - 57  LORazepam   Injectable 2 milliGRAM(s) IV Push every 6 hours PRN Agitation      ALLERGIES:  Allergies    No Known Allergies    Intolerances        LABS:                        13.5   18.4  )-----------( 281      ( 12 Oct 2018 00:45 )             41.7     10-12    139  |  109<H>  |  13  ----------------------------<  162<H>  3.6   |  19<L>  |  0.73    Ca    8.3<L>      12 Oct 2018 00:45  Phos  2.7     10  Mg     2.2     10-12    TPro  6.8  /  Alb  3.3  /  TBili  0.7  /  DBili  x   /  AST  24  /  ALT  12  /  AlkPhos  105  10-12    PT/INR - ( 11 Oct 2018 15:42 )   PT: 12.3 sec;   INR: 1.13 ratio         PTT - ( 12 Oct 2018 00:45 )  PTT:89.5 sec  Urinalysis Basic - ( 11 Oct 2018 18:27 )    Color: Light Yellow / Appearance: Clear / S.021 / pH: x  Gluc: x / Ketone: Small  / Bili: Negative / Urobili: Negative   Blood: x / Protein: 30 mg/dL / Nitrite: Negative   Leuk Esterase: Negative / RBC: 25 /hpf / WBC 5 /hpf   Sq Epi: x / Non Sq Epi: 1 /hpf / Bacteria: Negative        RADIOLOGY & ADDITIONAL TESTS: Reviewed.    < from: Transthoracic Echocardiogram (10.11.18 @ 10:04) >  Conclusions:  1. The posterior leaflet of the mitral valve is not well  visualized. Suboptimal color doppler evaluation. Eccentric  regurgitation jet that is probably moderate to severe. This  may be underestimated.  2. Normal left ventricular internal dimensions and wall  thicknesses.  3. The endocardium is not well visualized. On limited short  axis images, the basal to mid inferior wall appears  hypokinetic.  4. Normal right ventricular size and function.  5. Estimated pulmonary artery systolic pressure equals 17  mm Hg, assuming right atrial pressure equals 8  mm Hg,  consistent with normal pulmonary pressures.    < end of copied text >

## 2018-10-12 NOTE — PROGRESS NOTE ADULT - ASSESSMENT
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     #Neuro  Seizure  - patient with generalized tremors and subsequent confusion prior to presentation  - CT head x 2 negative for acute changes  - Utox, alcohol and salicylate levels all negative   - started on Keppra 500 q12 for seizure prophylaxis per neurology  - vEEG showing potential epileptogenic focus in the right anterior-mid temporal region, however no siezures were seen. Will restart vEEG following MRI  R/O CVA  - patient intubated in the ED for worsening mental status, concerning for possible acute stroke  - will obtain MRI brain w/o con, and MRA head w/o cont, and MRA neck with con  - maintain goal SBP of approx 140    #Resp  - Continue mechanical ventilation  COPD  - Duonebs q6 PRN    #CV  HTN  - Patient with history of hypertension, with SBP in the 220s on admission  - currently on Phenylephrine due to BP below goals, currently with goal SBP of 140  - will continue to monitor   Atrial Fibrillation  - [id number] of 6  - Coumadin reversed in the ED with K-centra and VitK in the setting of possible trauma  - continue with Hep gtt   - TTE showing mod-severe MR, otherwise unremarkable    #GI  - CT abd was benign for acute trauma  - c/w tube feeds     #Renal  - stable  - c/w soria  - monitor electrolytes    #Heme/Onc  - stable    #Endo  Hx of DM  - A1C of 5.4 on admission  - although patient is no longer diabetic, will continue FSq6 while intubated and receiving tube feeds   - continue low dose ISS with q6hr finger sticks  - will d/c once extubated     #ID  Leukocytosis  - Neutrophil predominant leukocytosis at 18.6 on admission, stable.  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     #Neuro  Seizure  - patient with generalized tremors and subsequent confusion prior to presentation  - CT head x 2 negative for acute changes  - Utox, alcohol and salicylate levels all negative   - started on Keppra 500 q12 for seizure prophylaxis per neurology  - vEEG showing potential epileptogenic focus in the right anterior-mid temporal region, however no siezures were seen. Will restart vEEG following MRI  R/O CVA  - patient intubated in the ED for worsening mental status, concerning for possible acute stroke  - will obtain MRI brain w/o con, and MRA head w/o cont, and MRA neck with con  - maintain goal SBP of approx 140    #Resp  - Continue mechanical ventilation  COPD  - Duonebs q6 PRN    #CV  HTN  - Patient with history of hypertension, with SBP in the 220s on admission  - currently on Phenylephrine due to BP below goals, currently with goal SBP of 140  - will continue to monitor   Atrial Fibrillation  - [id number] of 6  - Coumadin reversed in the ED with K-centra and VitK in the setting of possible trauma  - continue with Hep gtt   - TTE showing mod-severe MR, otherwise unremarkable    #GI  - CT abd was benign for acute trauma  - c/w tube feeds     #Renal  - stable  - c/w soria  - monitor electrolytes    #Heme/Onc  - stable    #Endo  Hx of DM  - A1C of 5.4 on admission  - although patient is no longer diabetic, will continue FSq6 while intubated and receiving tube feeds   - continue low dose ISS with q6hr finger sticks  - will d/c once extubated     #ID  Leukocytosis  - Neutrophil predominant leukocytosis at 18.6 on admission, stable.  -Patient febrile on 10/11 to 38.3, and started on Zosyn for suspected asp PNA  - continue Zosyn for now  - UA neg, and blood cx NGTD  - continue to monitor     #DVT ppx  - patient currently anticoagulated on Hep gtt

## 2018-10-13 LAB
ALBUMIN SERPL ELPH-MCNC: 2.9 G/DL — LOW (ref 3.3–5)
ALP SERPL-CCNC: 88 U/L — SIGNIFICANT CHANGE UP (ref 40–120)
ALT FLD-CCNC: 11 U/L — SIGNIFICANT CHANGE UP (ref 10–45)
AMMONIA BLD-MCNC: 36 UMOL/L — SIGNIFICANT CHANGE UP (ref 11–55)
ANION GAP SERPL CALC-SCNC: 9 MMOL/L — SIGNIFICANT CHANGE UP (ref 5–17)
APTT BLD: 172.1 SEC — CRITICAL HIGH (ref 27.5–37.4)
APTT BLD: 66 SEC — HIGH (ref 27.5–37.4)
APTT BLD: 71.1 SEC — HIGH (ref 27.5–37.4)
AST SERPL-CCNC: 18 U/L — SIGNIFICANT CHANGE UP (ref 10–40)
BASOPHILS # BLD AUTO: 0 K/UL — SIGNIFICANT CHANGE UP (ref 0–0.2)
BASOPHILS NFR BLD AUTO: 0.3 % — SIGNIFICANT CHANGE UP (ref 0–2)
BILIRUB SERPL-MCNC: 0.3 MG/DL — SIGNIFICANT CHANGE UP (ref 0.2–1.2)
BUN SERPL-MCNC: 11 MG/DL — SIGNIFICANT CHANGE UP (ref 7–23)
CALCIUM SERPL-MCNC: 8.3 MG/DL — LOW (ref 8.4–10.5)
CHLORIDE SERPL-SCNC: 109 MMOL/L — HIGH (ref 96–108)
CO2 SERPL-SCNC: 22 MMOL/L — SIGNIFICANT CHANGE UP (ref 22–31)
CREAT SERPL-MCNC: 0.67 MG/DL — SIGNIFICANT CHANGE UP (ref 0.5–1.3)
CRYPTOC AG CSF-ACNC: NEGATIVE — SIGNIFICANT CHANGE UP
CSF PCR RESULT: SIGNIFICANT CHANGE UP
CULTURE RESULTS: SIGNIFICANT CHANGE UP
EOSINOPHIL # BLD AUTO: 0.4 K/UL — SIGNIFICANT CHANGE UP (ref 0–0.5)
EOSINOPHIL NFR BLD AUTO: 3.2 % — SIGNIFICANT CHANGE UP (ref 0–6)
GAS PNL BLDA: SIGNIFICANT CHANGE UP
GLUCOSE SERPL-MCNC: 131 MG/DL — HIGH (ref 70–99)
HCT VFR BLD CALC: 31.7 % — LOW (ref 34.5–45)
HCT VFR BLD CALC: 33.1 % — LOW (ref 34.5–45)
HGB BLD-MCNC: 10.6 G/DL — LOW (ref 11.5–15.5)
HGB BLD-MCNC: 11.4 G/DL — LOW (ref 11.5–15.5)
LEGIONELLA AG UR QL: NEGATIVE — SIGNIFICANT CHANGE UP
LIDOCAIN IGE QN: 16 U/L — SIGNIFICANT CHANGE UP (ref 7–60)
LYMPHOCYTES # BLD AUTO: 1 K/UL — SIGNIFICANT CHANGE UP (ref 1–3.3)
LYMPHOCYTES # BLD AUTO: 8.8 % — LOW (ref 13–44)
MAGNESIUM SERPL-MCNC: 1.8 MG/DL — SIGNIFICANT CHANGE UP (ref 1.6–2.6)
MCHC RBC-ENTMCNC: 31.8 PG — SIGNIFICANT CHANGE UP (ref 27–34)
MCHC RBC-ENTMCNC: 32.6 PG — SIGNIFICANT CHANGE UP (ref 27–34)
MCHC RBC-ENTMCNC: 33.5 GM/DL — SIGNIFICANT CHANGE UP (ref 32–36)
MCHC RBC-ENTMCNC: 34.4 GM/DL — SIGNIFICANT CHANGE UP (ref 32–36)
MCV RBC AUTO: 94.8 FL — SIGNIFICANT CHANGE UP (ref 80–100)
MCV RBC AUTO: 95 FL — SIGNIFICANT CHANGE UP (ref 80–100)
MONOCYTES # BLD AUTO: 0.9 K/UL — SIGNIFICANT CHANGE UP (ref 0–0.9)
MONOCYTES NFR BLD AUTO: 7.6 % — SIGNIFICANT CHANGE UP (ref 2–14)
NEUTROPHILS # BLD AUTO: 9.6 K/UL — HIGH (ref 1.8–7.4)
NEUTROPHILS NFR BLD AUTO: 80.1 % — HIGH (ref 43–77)
NIGHT BLUE STAIN TISS: SIGNIFICANT CHANGE UP
PHOSPHATE SERPL-MCNC: 1.9 MG/DL — LOW (ref 2.5–4.5)
PLATELET # BLD AUTO: 202 K/UL — SIGNIFICANT CHANGE UP (ref 150–400)
PLATELET # BLD AUTO: 206 K/UL — SIGNIFICANT CHANGE UP (ref 150–400)
POTASSIUM SERPL-MCNC: 3.6 MMOL/L — SIGNIFICANT CHANGE UP (ref 3.5–5.3)
POTASSIUM SERPL-SCNC: 3.6 MMOL/L — SIGNIFICANT CHANGE UP (ref 3.5–5.3)
PROT SERPL-MCNC: 5.7 G/DL — LOW (ref 6–8.3)
RAPID RVP RESULT: SIGNIFICANT CHANGE UP
RBC # BLD: 3.34 M/UL — LOW (ref 3.8–5.2)
RBC # BLD: 3.49 M/UL — LOW (ref 3.8–5.2)
RBC # FLD: 11.4 % — SIGNIFICANT CHANGE UP (ref 10.3–14.5)
RBC # FLD: 12.1 % — SIGNIFICANT CHANGE UP (ref 10.3–14.5)
SODIUM SERPL-SCNC: 140 MMOL/L — SIGNIFICANT CHANGE UP (ref 135–145)
SPECIMEN SOURCE: SIGNIFICANT CHANGE UP
SPECIMEN SOURCE: SIGNIFICANT CHANGE UP
TRIGL SERPL-MCNC: 109 MG/DL — SIGNIFICANT CHANGE UP (ref 10–149)
WBC # BLD: 11.9 K/UL — HIGH (ref 3.8–10.5)
WBC # BLD: 9.8 K/UL — SIGNIFICANT CHANGE UP (ref 3.8–10.5)
WBC # FLD AUTO: 11.9 K/UL — HIGH (ref 3.8–10.5)
WBC # FLD AUTO: 9.8 K/UL — SIGNIFICANT CHANGE UP (ref 3.8–10.5)

## 2018-10-13 PROCEDURE — 93308 TTE F-UP OR LMTD: CPT | Mod: 26,GC

## 2018-10-13 PROCEDURE — 93010 ELECTROCARDIOGRAM REPORT: CPT

## 2018-10-13 PROCEDURE — 76604 US EXAM CHEST: CPT | Mod: 26,GC

## 2018-10-13 PROCEDURE — 99291 CRITICAL CARE FIRST HOUR: CPT

## 2018-10-13 RX ORDER — POTASSIUM PHOSPHATE, MONOBASIC POTASSIUM PHOSPHATE, DIBASIC 236; 224 MG/ML; MG/ML
30 INJECTION, SOLUTION INTRAVENOUS ONCE
Qty: 0 | Refills: 0 | Status: COMPLETED | OUTPATIENT
Start: 2018-10-13 | End: 2018-10-13

## 2018-10-13 RX ORDER — ACETAMINOPHEN 500 MG
650 TABLET ORAL ONCE
Qty: 0 | Refills: 0 | Status: COMPLETED | OUTPATIENT
Start: 2018-10-13 | End: 2018-10-13

## 2018-10-13 RX ORDER — IPRATROPIUM/ALBUTEROL SULFATE 18-103MCG
3 AEROSOL WITH ADAPTER (GRAM) INHALATION EVERY 6 HOURS
Qty: 0 | Refills: 0 | Status: DISCONTINUED | OUTPATIENT
Start: 2018-10-13 | End: 2018-10-31

## 2018-10-13 RX ORDER — PIPERACILLIN AND TAZOBACTAM 4; .5 G/20ML; G/20ML
3.38 INJECTION, POWDER, LYOPHILIZED, FOR SOLUTION INTRAVENOUS EVERY 8 HOURS
Qty: 0 | Refills: 0 | Status: DISCONTINUED | OUTPATIENT
Start: 2018-10-13 | End: 2018-10-19

## 2018-10-13 RX ORDER — AZITHROMYCIN 500 MG/1
TABLET, FILM COATED ORAL
Qty: 0 | Refills: 0 | Status: DISCONTINUED | OUTPATIENT
Start: 2018-10-13 | End: 2018-10-14

## 2018-10-13 RX ORDER — MAGNESIUM SULFATE 500 MG/ML
2 VIAL (ML) INJECTION ONCE
Qty: 0 | Refills: 0 | Status: COMPLETED | OUTPATIENT
Start: 2018-10-13 | End: 2018-10-13

## 2018-10-13 RX ORDER — AZITHROMYCIN 500 MG/1
500 TABLET, FILM COATED ORAL ONCE
Qty: 0 | Refills: 0 | Status: COMPLETED | OUTPATIENT
Start: 2018-10-13 | End: 2018-10-13

## 2018-10-13 RX ORDER — SODIUM CHLORIDE 9 MG/ML
500 INJECTION INTRAMUSCULAR; INTRAVENOUS; SUBCUTANEOUS ONCE
Qty: 0 | Refills: 0 | Status: COMPLETED | OUTPATIENT
Start: 2018-10-13 | End: 2018-10-13

## 2018-10-13 RX ORDER — HEPARIN SODIUM 5000 [USP'U]/ML
700 INJECTION INTRAVENOUS; SUBCUTANEOUS
Qty: 25000 | Refills: 0 | Status: DISCONTINUED | OUTPATIENT
Start: 2018-10-13 | End: 2018-10-20

## 2018-10-13 RX ORDER — AZITHROMYCIN 500 MG/1
500 TABLET, FILM COATED ORAL EVERY 24 HOURS
Qty: 0 | Refills: 0 | Status: DISCONTINUED | OUTPATIENT
Start: 2018-10-14 | End: 2018-10-14

## 2018-10-13 RX ORDER — POTASSIUM CHLORIDE 20 MEQ
40 PACKET (EA) ORAL ONCE
Qty: 0 | Refills: 0 | Status: COMPLETED | OUTPATIENT
Start: 2018-10-13 | End: 2018-10-13

## 2018-10-13 RX ADMIN — Medication 20 MILLIGRAM(S): at 15:05

## 2018-10-13 RX ADMIN — SODIUM CHLORIDE 3000 MILLILITER(S): 9 INJECTION INTRAMUSCULAR; INTRAVENOUS; SUBCUTANEOUS at 22:56

## 2018-10-13 RX ADMIN — Medication 3 MILLILITER(S): at 18:08

## 2018-10-13 RX ADMIN — Medication 4.01 MICROGRAM(S)/KG/MIN: at 21:33

## 2018-10-13 RX ADMIN — POLYETHYLENE GLYCOL 3350 17 GRAM(S): 17 POWDER, FOR SOLUTION ORAL at 12:27

## 2018-10-13 RX ADMIN — Medication 20 MILLIGRAM(S): at 21:32

## 2018-10-13 RX ADMIN — Medication 650 MILLIGRAM(S): at 01:00

## 2018-10-13 RX ADMIN — PIPERACILLIN AND TAZOBACTAM 25 GRAM(S): 4; .5 INJECTION, POWDER, LYOPHILIZED, FOR SOLUTION INTRAVENOUS at 21:33

## 2018-10-13 RX ADMIN — LEVETIRACETAM 400 MILLIGRAM(S): 250 TABLET, FILM COATED ORAL at 05:54

## 2018-10-13 RX ADMIN — SENNA PLUS 10 MILLILITER(S): 8.6 TABLET ORAL at 21:33

## 2018-10-13 RX ADMIN — Medication 260 MILLIGRAM(S): at 00:40

## 2018-10-13 RX ADMIN — POTASSIUM PHOSPHATE, MONOBASIC POTASSIUM PHOSPHATE, DIBASIC 83.33 MILLIMOLE(S): 236; 224 INJECTION, SOLUTION INTRAVENOUS at 06:33

## 2018-10-13 RX ADMIN — Medication 250 MILLIGRAM(S): at 01:30

## 2018-10-13 RX ADMIN — Medication 104.2 MILLIGRAM(S): at 02:57

## 2018-10-13 RX ADMIN — LEVETIRACETAM 400 MILLIGRAM(S): 250 TABLET, FILM COATED ORAL at 18:43

## 2018-10-13 RX ADMIN — Medication 50 GRAM(S): at 04:51

## 2018-10-13 RX ADMIN — Medication 250 MILLIGRAM(S): at 15:05

## 2018-10-13 RX ADMIN — AZITHROMYCIN 250 MILLIGRAM(S): 500 TABLET, FILM COATED ORAL at 13:00

## 2018-10-13 RX ADMIN — Medication 40 MILLIEQUIVALENT(S): at 04:51

## 2018-10-13 RX ADMIN — PROPOFOL 1.28 MICROGRAM(S)/KG/MIN: 10 INJECTION, EMULSION INTRAVENOUS at 21:33

## 2018-10-13 RX ADMIN — PIPERACILLIN AND TAZOBACTAM 25 GRAM(S): 4; .5 INJECTION, POWDER, LYOPHILIZED, FOR SOLUTION INTRAVENOUS at 15:05

## 2018-10-13 RX ADMIN — HEPARIN SODIUM 0 UNIT(S)/HR: 5000 INJECTION INTRAVENOUS; SUBCUTANEOUS at 05:27

## 2018-10-13 NOTE — CHART NOTE - NSCHARTNOTEFT_GEN_A_CORE
: Amy Tomas    INDICATION: respiratory failure, shock state    PROCEDURE:  [ x] LIMITED ECHO  [ x] LIMITED CHEST  [ ] LIMITED RETROPERITONEAL  [ ] LIMITED ABDOMINAL  [ ] LIMITED DVT  [ ] NEEDLE GUIDANCE VASCULAR  [ ] NEEDLE GUIDANCE THORACENTESIS  [ ] NEEDLE GUIDANCE PARACENTESIS  [ ] NEEDLE GUIDANCE PERICARDIOCENTESIS  [ ] OTHER    FINDINGS:  CHest- A lines anteriorly, small consolidation pattern with dynamic air bronchograms left posterior lateral area  Echo- poor windows anteriorly, normal LV systolic function and Lv/RV size based on subcostal view, indeterminate IVC    INTERPRETATION:  left pneumonia likely cause of septic shock - continue with antitbiotics and will keep on ventilator for today

## 2018-10-13 NOTE — PROGRESS NOTE ADULT - ASSESSMENT
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     #Neuro  Seizure  - patient with generalized tremors and subsequent confusion prior to presentation  - CT head x 2 negative for acute changes  - Utox, alcohol and salicylate levels all negative   - started on Keppra 500 q12 for seizure prophylaxis per neurology  - vEEG showing potential epileptogenic focus in the right anterior-mid temporal region, however no siezures were seen. Will restart vEEG following MRI  R/O CVA  - patient intubated in the ED for worsening mental status, concerning for possible acute stroke  - will obtain MRI brain w/o con, and MRA head w/o cont, and MRA neck with con  - maintain goal SBP of approx 140    #Resp  - Continue mechanical ventilation  COPD  - Duonebs q6 PRN    #CV  HTN  - Patient with history of hypertension, with SBP in the 220s on admission  - currently on Phenylephrine due to BP below goals, currently with goal SBP of 140  - will continue to monitor   Atrial Fibrillation  - [id number] of 6  - Coumadin reversed in the ED with K-centra and VitK in the setting of possible trauma  - continue with Hep gtt   - TTE showing mod-severe MR, otherwise unremarkable    #GI  - CT abd was benign for acute trauma  - c/w tube feeds     #Renal  - stable  - c/w soria  - monitor electrolytes    #Heme/Onc  - stable    #Endo  Hx of DM  - A1C of 5.4 on admission  - although patient is no longer diabetic, will continue FSq6 while intubated and receiving tube feeds   - continue low dose ISS with q6hr finger sticks  - will d/c once extubated     #ID  Leukocytosis  - Neutrophil predominant leukocytosis at 18.6 on admission, stable.  -Patient febrile on 10/11 to 38.3, and started on Zosyn for suspected asp PNA  - s/p lumbar puncture wnl started on cftx and acyclovir, will dc given negative lumbar puncture.   - UA neg, and blood cx NGTD  - continue to monitor     #DVT ppx  - patient currently anticoagulated on Hep gtt Patient is a 72 yo F with PMHx of COPD, Bronchitis, Afib (on home coumadin), Right basal ganglion infarct (with resulting dysphagia s/p PEG and residual left spastic hemiparesis) who presented to the ED after unwitnessed fall and possible seizure activity s/p intubation.     #Neuro  Seizure  - patient with generalized tremors and subsequent confusion prior to presentation  - CT head x 2 negative for acute changes  - Utox, alcohol and salicylate levels all negative   - started on levetiracetam 500 q12 for seizure prophylaxis per neurology  - vEEG showing potential epileptogenic focus in the right anterior-mid temporal region, however no siezures were seen. Will restart vEEG following MRI  R/O CVA  - patient intubated in the ED for worsening mental status, concerning for possible acute stroke  -MRI brain/neck without signs of worsening hemorrhage, edema or infarct, also no enhancement concerning for meningitis. No stenosis/dissection on neck.   - maintain goal SBP of approx 140    #Resp  - Continue mechanical ventilation  COPD  - Duonebs q6 PRN    #CV  HTN  - Patient with history of hypertension, with SBP in the 220s on admission  - had lotus/hypotensive episode w/ dc of phenylephrine then given norepi with improvement of BP and HR, unclear cause potentially septic vs neurogenic?  - will continue to monitor   Atrial Fibrillation  - [id number] of 6  - Coumadin reversed in the ED with K-centra and VitK in the setting of possible trauma  - continue with Hep gtt   - TTE showing mod-severe MR, otherwise unremarkable    #GI  -elevated triglycerides of unclear etiology. Will repeat with lipase. Although unlikely cause of current presentation.   - CT abd was benign for acute trauma  - c/w tube feeds     #Renal  - stable  - c/w soria  - monitor electrolytes    #Heme/Onc  - stable    #Endo  Hx of DM  - A1C of 5.4 on admission  - although patient is no longer diabetic, will continue FSq6 while intubated and receiving tube feeds   - continue low dose ISS with q6hr finger sticks  - will d/c once extubated     #ID  Leukocytosis  - Neutrophil predominant leukocytosis at 18.6 on admission, stable.  -Patient febrile on 10/11 to 38.3, and started on Zosyn for suspected asp PNA  - s/p lumbar puncture wnl started on cftx and acyclovir, will dc given negative lumbar puncture. CSF PCR negative.   -sent sent lumbar fluid for culture/crypto, and blood borrelia, [place], VDRL,   - UA neg, and blood cx NGTD  - continue to monitor     #DVT ppx  - patient currently anticoagulated on Hep gtt Patient is a 74 yo F with PMHx of COPD, Bronchitis, Afib (on home coumadin), Right basal ganglion infarct (with resulting dysphagia s/p PEG and residual left spastic hemiparesis) who presented to the ED after unwitnessed fall and possible seizure activity s/p intubation. Unclear etiology of encephalopathy.     #Neuro  Seizure  - patient with generalized tremors and subsequent confusion prior to presentation  - CT head x 2 negative for acute changes  - Utox, alcohol and salicylate levels all negative   - started on levetiracetam 500 q12 for seizure prophylaxis per neurology  - vEEG showing potential epileptogenic focus in the right anterior-mid temporal region, however no seizures were seen. Will restart vEEG following MRI  R/O CVA  - patient intubated in the ED for worsening mental status, concerning for possible acute stroke  -MRI brain/neck without signs of worsening hemorrhage, edema or infarct, also no enhancement concerning for meningitis. No stenosis/dissection on neck.   - maintain goal SBP of approx 140  -propofol for sedation given unable to tolerate dexmedetomidine sedation.    #Resp  - Continue mechanical ventilation  COPD  -will start methylprednisolone   - Duonebs q6  Small LLL consolidation   -will start pip-tazo and continue vanc  -RVP panel, urine legionella    #CV  HTN  - Patient with history of hypertension, with SBP in the 220s on admission  - had lotus/hypotensive episode w/ dc of phenylephrine then given norepi with improvement of BP and HR, unclear cause potentially septic vs neurogenic? will wean as tolerated  - will continue to monitor   Atrial Fibrillation  - [id number] of 6  - Coumadin reversed in the ED with K-centra and VitK in the setting of possible trauma  - continue with Hep gtt   - TTE showing mod-severe MR, otherwise unremarkable    #GI  -elevated triglycerides of unclear etiology. Will repeat with lipase. Although unlikely cause of current presentation.   - CT abd was benign for acute trauma  - c/w tube feeds     #Renal  - stable  - c/w soria  - monitor electrolytes    #Heme/Onc  - stable    #Endo  Hx of DM  - A1C of 5.4 on admission  - although patient is no longer diabetic, will continue FSq6 while intubated and receiving tube feeds   - continue low dose ISS with q6hr finger sticks will monitor with steroids  - will d/c once extubated     #ID  Leukocytosis  - Neutrophil predominant leukocytosis at 18.6 on admission, stable.  -Patient febrile on 10/11 to 38.3, and started on Zosyn for suspected asp PNA  - s/p lumbar puncture wnl started on cftx and acyclovir, will dc given negative lumbar puncture. CSF PCR negative. Will dc acyclovir and switch cftx to pip-tazo.  -sent sent lumbar fluid for culture/crypto, and blood borrelia, [place], VDRL,   - UA neg, and blood cx NGTD, repeat cultures  - continue to monitor     #DVT ppx  - patient currently anticoagulated on Hep gtt

## 2018-10-13 NOTE — PROGRESS NOTE ADULT - ATTENDING COMMENTS
Pt seen and examined. 73 F with COPD, CVA, L hemiparesis, dysphagia s/p PEG, now admitted post fall, found to have acute resp failure with hypoxia requiring intubation and seizures. No acute changes on CT head and MRI/ MRA of the brain. Cont Keppra BID. Bedside POCUS of the lung showing LLL consolidation with mobile air bronchograms. CSF negative. Will cont broad coverage with IV Vanc, Zosyn and Azithro. D-c acyclovir. Check RVP, Scx and urine legionella Ag. CSF gram stain and PCR negative. Bcxs NGTD. B/L wheezing one xam. Will start Solumedrol 20 mg IV q8H and Duonebs Q6H. Currently BP stable off pressor support. Guarded prognosis.  and daughters updated at bedside.   - Critical Care time Spent: 40 mins

## 2018-10-13 NOTE — PROGRESS NOTE ADULT - SUBJECTIVE AND OBJECTIVE BOX
CHIEF COMPLAINT:    Interval Events:  Restarted on tube feeds and heparin drip.  Had increasing T without temperature so given acetaminiphen.  Given Mg and K for afib and repleated phosp.     REVIEW OF SYSTEMS:  Constitutional: [ ] negative [ ] fevers [ ] chills [ ] weight loss [ ] weight gain  HEENT: [ ] negative [ ] dry eyes [ ] eye irritation [ ] postnasal drip [ ] nasal congestion  CV: [ ] negative  [ ] chest pain [ ] orthopnea [ ] palpitations [ ] murmur  Resp: [ ] negative [ ] cough [ ] shortness of breath [ ] dyspnea [ ] wheezing [ ] sputum [ ] hemoptysis  GI: [ ] negative [ ] nausea [ ] vomiting [ ] diarrhea [ ] constipation [ ] abd pain [ ] dysphagia   : [ ] negative [ ] dysuria [ ] nocturia [ ] hematuria [ ] increased urinary frequency  Musculoskeletal: [ ] negative [ ] back pain [ ] myalgias [ ] arthralgias [ ] fracture  Skin: [ ] negative [ ] rash [ ] itch  Neurological: [ ] negative [ ] headache [ ] dizziness [ ] syncope [ ] weakness [ ] numbness  Psychiatric: [ ] negative [ ] anxiety [ ] depression  Endocrine: [ ] negative [ ] diabetes [ ] thyroid problem  Hematologic/Lymphatic: [ ] negative [ ] anemia [ ] bleeding problem  Allergic/Immunologic: [ ] negative [ ] itchy eyes [ ] nasal discharge [ ] hives [ ] angioedema  [ ] All other systems negative  [ ] Unable to assess ROS because ________    OBJECTIVE:  ICU Vital Signs Last 24 Hrs  T(C): 36.8 (13 Oct 2018 04:00), Max: 37.7 (12 Oct 2018 08:00)  T(F): 98.3 (13 Oct 2018 04:00), Max: 99.9 (12 Oct 2018 08:00)  HR: 107 (13 Oct 2018 07:00) (58 - 130)  BP: 142/67 (13 Oct 2018 07:00) (39/19 - 195/150)  BP(mean): 96 (13 Oct 2018 07:00) (24 - 169)  ABP: --  ABP(mean): --  RR: 21 (13 Oct 2018 07:00) (14 - 33)  SpO2: 100% (13 Oct 2018 07:00) (89% - 100%)    Mode: AC/ CMV (Assist Control/ Continuous Mandatory Ventilation), RR (machine): 14, TV (machine): 400, FiO2: 30, PEEP: 5, ITime: 1, MAP: 10, PIP: 20    10 @ 07:01  -  10-13 @ 07:00  --------------------------------------------------------  IN: 2466 mL / OUT: 605 mL / NET: 1861 mL      CAPILLARY BLOOD GLUCOSE      POCT Blood Glucose.: 135 mg/dL (12 Oct 2018 05:44)      PHYSICAL EXAM:  General:   HEENT:   Lymph Nodes:  Neck:   Respiratory:   Cardiovascular:   Abdomen:   Extremities:   Skin:   Neurological:  Psychiatry:    LINES:    HOSPITAL MEDICATIONS:  Standing Meds:  acyclovir IVPB      acyclovir IVPB 210 milliGRAM(s) IV Intermittent every 8 hours  cefTRIAXone   IVPB 2 Gram(s) IV Intermittent every 24 hours  cefTRIAXone   IVPB      dexmedetomidine Infusion 0.2 MICROgram(s)/kG/Hr IV Continuous <Continuous>  dextrose 5%. 1000 milliLiter(s) IV Continuous <Continuous>  dextrose 50% Injectable 12.5 Gram(s) IV Push once  dextrose 50% Injectable 25 Gram(s) IV Push once  dextrose 50% Injectable 25 Gram(s) IV Push once  heparin  Infusion.  Unit(s)/Hr IV Continuous <Continuous>  levETIRAcetam  IVPB 500 milliGRAM(s) IV Intermittent every 12 hours  norepinephrine Infusion 0.05 MICROgram(s)/kG/Min IV Continuous <Continuous>  phenylephrine    Infusion 0.4 MICROgram(s)/kG/Min IV Continuous <Continuous>  polyethylene glycol 3350 17 Gram(s) Oral daily  propofol Infusion 5 MICROgram(s)/kG/Min IV Continuous <Continuous>  senna Syrup 10 milliLiter(s) Oral at bedtime  vancomycin  IVPB 1000 milliGRAM(s) IV Intermittent every 12 hours      PRN Meds:  acetaminophen   Tablet .. 650 milliGRAM(s) Oral every 6 hours PRN  ALBUTerol/ipratropium for Nebulization 3 milliLiter(s) Nebulizer every 6 hours PRN  dextrose 40% Gel 15 Gram(s) Oral once PRN  glucagon  Injectable 1 milliGRAM(s) IntraMuscular once PRN  haloperidol    Injectable 1 milliGRAM(s) IV Push every 6 hours PRN  heparin  Injectable 3500 Unit(s) IV Push every 6 hours PRN  heparin  Injectable 1500 Unit(s) IV Push every 6 hours PRN      LABS:                        11.4   11.9  )-----------( 206      ( 13 Oct 2018 01:39 )             33.1     Hgb Trend: 11.4<--, 13.5<--, 13.5<--, 15.3<--, 14.3<--  1013    140  |  109<H>  |  11  ----------------------------<  131<H>  3.6   |  22  |  0.67    Ca    8.3<L>      13 Oct 2018 01:39  Phos  1.9     10-13  Mg     1.8     10-13    TPro  5.7<L>  /  Alb  2.9<L>  /  TBili  0.3  /  DBili  x   /  AST  18  /  ALT  11  /  AlkPhos  88  10-13    Creatinine Trend: 0.67<--, 0.73<--, 0.73<--, 0.73<--, 0.59<--, 0.80<--  PT/INR - ( 11 Oct 2018 15:42 )   PT: 12.3 sec;   INR: 1.13 ratio         PTT - ( 13 Oct 2018 04:34 )  PTT:172.1 sec  Urinalysis Basic - ( 11 Oct 2018 18:27 )    Color: Light Yellow / Appearance: Clear / S.021 / pH: x  Gluc: x / Ketone: Small  / Bili: Negative / Urobili: Negative   Blood: x / Protein: 30 mg/dL / Nitrite: Negative   Leuk Esterase: Negative / RBC: 25 /hpf / WBC 5 /hpf   Sq Epi: x / Non Sq Epi: 1 /hpf / Bacteria: Negative      Arterial Blood Gas:  10-12 @ 13:00  7.39/35/262/21/100/-3.0  ABG lactate: --    Venous Blood Gas:  10-12 @ 12:16  7.33/47/41/24/72  VBG Lactate: --      MICROBIOLOGY:     Culture - CSF with Gram Stain (collected 12 Oct 2018 21:07)  Source: .CSF CSF  Gram Stain (12 Oct 2018 21:42):    polymorphonuclear leukocytes seen per low power field    No organisms seen per oil power field by cytocentrifuge    Culture - Sputum (collected 12 Oct 2018 02:30)  Source: .Sputum Sputum  Gram Stain (12 Oct 2018 15:00):    Moderate polymorphonuclear leukocytes per low power field    Moderate squamous epithelial cells per low power field    Numerous Gram positive cocci in pairs, chains and clusters    Moderate Gram Positive Rods per oil power field    Few Gram Negative Rods per oil power field    Few Gram Negative Diplococci per oil power field  Preliminary Report (12 Oct 2018 19:05):    Normal Respiratory Hayde present    Culture - Blood (collected 10 Oct 2018 21:44)  Source: .Blood Blood-Venous  Preliminary Report (11 Oct 2018 22:01):    No growth to date.    Culture - Blood (collected 10 Oct 2018 21:42)  Source: .Blood Blood-Peripheral  Preliminary Report (11 Oct 2018 22:01):    No growth to date.      RADIOLOGY:  [ ] Reviewed and interpreted by me    EKG: CHIEF COMPLAINT:    Interval Events:  Patient had bradycardic and hypotensive episode after dc of phenylephrine yesterday improved with norepinephrine.  Restarted on tube feeds and heparin drip for LP.  Switched antibx to cftx/vanc and added acyclovir.   Had increasing T without temperature so given acetaminiphen.  Given Mg and K for afib and repleated phosp.     REVIEW OF SYSTEMS:  Constitutional: [ ] negative [ ] fevers [ ] chills [ ] weight loss [ ] weight gain  HEENT: [ ] negative [ ] dry eyes [ ] eye irritation [ ] postnasal drip [ ] nasal congestion  CV: [ ] negative  [ ] chest pain [ ] orthopnea [ ] palpitations [ ] murmur  Resp: [ ] negative [ ] cough [ ] shortness of breath [ ] dyspnea [ ] wheezing [ ] sputum [ ] hemoptysis  GI: [ ] negative [ ] nausea [ ] vomiting [ ] diarrhea [ ] constipation [ ] abd pain [ ] dysphagia   : [ ] negative [ ] dysuria [ ] nocturia [ ] hematuria [ ] increased urinary frequency  Musculoskeletal: [ ] negative [ ] back pain [ ] myalgias [ ] arthralgias [ ] fracture  Skin: [ ] negative [ ] rash [ ] itch  Neurological: [ ] negative [ ] headache [ ] dizziness [ ] syncope [ ] weakness [ ] numbness  Psychiatric: [ ] negative [ ] anxiety [ ] depression  Endocrine: [ ] negative [ ] diabetes [ ] thyroid problem  Hematologic/Lymphatic: [ ] negative [ ] anemia [ ] bleeding problem  Allergic/Immunologic: [ ] negative [ ] itchy eyes [ ] nasal discharge [ ] hives [ ] angioedema  [ ] All other systems negative  [X] Unable to assess ROS because ___intubated and sedated_____    OBJECTIVE:  ICU Vital Signs Last 24 Hrs  T(C): 36.8 (13 Oct 2018 04:00), Max: 37.7 (12 Oct 2018 08:00)  T(F): 98.3 (13 Oct 2018 04:00), Max: 99.9 (12 Oct 2018 08:00)  HR: 107 (13 Oct 2018 07:00) (58 - 130)  BP: 142/67 (13 Oct 2018 07:00) (39/19 - 195/150)  BP(mean): 96 (13 Oct 2018 07:00) (24 - 169)  ABP: --  ABP(mean): --  RR: 21 (13 Oct 2018 07:00) (14 - 33)  SpO2: 100% (13 Oct 2018 07:00) (89% - 100%)    Mode: AC/ CMV (Assist Control/ Continuous Mandatory Ventilation), RR (machine): 14, TV (machine): 400, FiO2: 30, PEEP: 5, ITime: 1, MAP: 10, PIP: 20    10- @ 07:01  -  10- @ 07:00  --------------------------------------------------------  IN: 2466 mL / OUT: 605 mL / NET: 1861 mL      CAPILLARY BLOOD GLUCOSE      POCT Blood Glucose.: 135 mg/dL (12 Oct 2018 05:44)      PHYSICAL EXAM:  General: intubated and sedated  HEENT: PEERL, ET and OG tube in place  Lymph Nodes: no cervical lymphadenopathy  Neck: No JVD. R IJ in placed without erythema/edema/discharge  Respiratory: CTA bilaterally  Cardiovascular: irreg, irreg without murmur rub or gallop  Abdomen: nondistended, normoactive BS  Extremities: decerebrate LUE, brace on LLE  Skin: no skin breakdown. Eccymosis under left eye  Neurological: sedated, withdraws to noxious stimuli, + gag.  Psychiatry: sedated    LINES: [facility], [facility]    HOSPITAL MEDICATIONS:  Standing Meds:  acyclovir IVPB      acyclovir IVPB 210 milliGRAM(s) IV Intermittent every 8 hours  cefTRIAXone   IVPB 2 Gram(s) IV Intermittent every 24 hours  cefTRIAXone   IVPB      dexmedetomidine Infusion 0.2 MICROgram(s)/kG/Hr IV Continuous <Continuous>  dextrose 5%. 1000 milliLiter(s) IV Continuous <Continuous>  dextrose 50% Injectable 12.5 Gram(s) IV Push once  dextrose 50% Injectable 25 Gram(s) IV Push once  dextrose 50% Injectable 25 Gram(s) IV Push once  heparin  Infusion.  Unit(s)/Hr IV Continuous <Continuous>  levETIRAcetam  IVPB 500 milliGRAM(s) IV Intermittent every 12 hours  norepinephrine Infusion 0.05 MICROgram(s)/kG/Min IV Continuous <Continuous>  phenylephrine    Infusion 0.4 MICROgram(s)/kG/Min IV Continuous <Continuous>  polyethylene glycol 3350 17 Gram(s) Oral daily  propofol Infusion 5 MICROgram(s)/kG/Min IV Continuous <Continuous>  senna Syrup 10 milliLiter(s) Oral at bedtime  vancomycin  IVPB 1000 milliGRAM(s) IV Intermittent every 12 hours      PRN Meds:  acetaminophen   Tablet .. 650 milliGRAM(s) Oral every 6 hours PRN  ALBUTerol/ipratropium for Nebulization 3 milliLiter(s) Nebulizer every 6 hours PRN  dextrose 40% Gel 15 Gram(s) Oral once PRN  glucagon  Injectable 1 milliGRAM(s) IntraMuscular once PRN  haloperidol    Injectable 1 milliGRAM(s) IV Push every 6 hours PRN  heparin  Injectable 3500 Unit(s) IV Push every 6 hours PRN  heparin  Injectable 1500 Unit(s) IV Push every 6 hours PRN      LABS:                        11.4   11.9  )-----------( 206      ( 13 Oct 2018 01:39 )             33.1     Hgb Trend: 11.4<--, 13.5<--, 13.5<--, 15.3<--, 14.3<--  10-13    140  |  109<H>  |  11  ----------------------------<  131<H>  3.6   |  22  |  0.67    Ca    8.3<L>      13 Oct 2018 01:39  Phos  1.9     10-13  Mg     1.8     10-13    TPro  5.7<L>  /  Alb  2.9<L>  /  TBili  0.3  /  DBili  x   /  AST  18  /  ALT  11  /  AlkPhos  88  10-    Creatinine Trend: 0.67<--, 0.73<--, 0.73<--, 0.73<--, 0.59<--, 0.80<--  PT/INR - ( 11 Oct 2018 15:42 )   PT: 12.3 sec;   INR: 1.13 ratio         PTT - ( 13 Oct 2018 04:34 )  PTT:172.1 sec  Urinalysis Basic - ( 11 Oct 2018 18:27 )    Color: Light Yellow / Appearance: Clear / S.021 / pH: x  Gluc: x / Ketone: Small  / Bili: Negative / Urobili: Negative   Blood: x / Protein: 30 mg/dL / Nitrite: Negative   Leuk Esterase: Negative / RBC: 25 /hpf / WBC 5 /hpf   Sq Epi: x / Non Sq Epi: 1 /hpf / Bacteria: Negative      Arterial Blood Gas:  10-12 @ 13:00  7.39/35/262/21/100/-3.0  ABG lactate: --    Venous Blood Gas:  10-12 @ 12:16  7.33/47/41/24/72  VBG Lactate: --      MICROBIOLOGY:     Culture - CSF with Gram Stain (collected 12 Oct 2018 21:07)  Source: .CSF CSF  Gram Stain (12 Oct 2018 21:42):    polymorphonuclear leukocytes seen per low power field    No organisms seen per oil power field by cytocentrifuge    Culture - Sputum (collected 12 Oct 2018 02:30)  Source: .Sputum Sputum  Gram Stain (12 Oct 2018 15:00):    Moderate polymorphonuclear leukocytes per low power field    Moderate squamous epithelial cells per low power field    Numerous Gram positive cocci in pairs, chains and clusters    Moderate Gram Positive Rods per oil power field    Few Gram Negative Rods per oil power field    Few Gram Negative Diplococci per oil power field  Preliminary Report (12 Oct 2018 19:05):    Normal Respiratory Hayde present    Culture - Blood (collected 10 Oct 2018 21:44)  Source: .Blood Blood-Venous  Preliminary Report (11 Oct 2018 22:01):    No growth to date.    Culture - Blood (collected 10 Oct 2018 21:42)  Source: .Blood Blood-Peripheral  Preliminary Report (11 Oct 2018 22:01):    No growth to date.      RADIOLOGY:  < from: MR Head w/wo IV Cont (10.12.18 @ 16:57) >  IMPRESSION:    MRI BRAIN:    No acute intracranial hemorrhage, mass effect, vasogenic edema, or   evidence of acute territorial infarct.    Chronic right MCA territory infarct with ex vacuo dilatation of the right   lateral ventricle. Chronic hemorrhage in the region of the right basal   ganglia.     No abnormal parenchymal or leptomeningeal enhancement.    MRA BRAIN:    Significantly limited by patient motion and slab artifact.    Evaluation for flow-limiting stenosis or vascular aneurysm is markedly   limited.    MRA NECK:    No flow-limiting stenosisor evidence of arterial dissection. Carotid   bifurcations and origins of the common carotid and vertebral arteries   unremarkable.    < end of copied text >  [ ] Reviewed and interpreted by me CHIEF COMPLAINT: AMS and fall    Interval Events:  Patient had bradycardic and hypotensive episode after dc of phenylephrine yesterday improved with norepinephrine.  Restarted on tube feeds and heparin drip for LP.  Switched antibx to cftx/vanc and added acyclovir.   Had increasing T without temperature so given acetaminiphen.  Given Mg and K for afib and repleated phosp.     REVIEW OF SYSTEMS:  Constitutional: [ ] negative [ ] fevers [ ] chills [ ] weight loss [ ] weight gain  HEENT: [ ] negative [ ] dry eyes [ ] eye irritation [ ] postnasal drip [ ] nasal congestion  CV: [ ] negative  [ ] chest pain [ ] orthopnea [ ] palpitations [ ] murmur  Resp: [ ] negative [ ] cough [ ] shortness of breath [ ] dyspnea [ ] wheezing [ ] sputum [ ] hemoptysis  GI: [ ] negative [ ] nausea [ ] vomiting [ ] diarrhea [ ] constipation [ ] abd pain [ ] dysphagia   : [ ] negative [ ] dysuria [ ] nocturia [ ] hematuria [ ] increased urinary frequency  Musculoskeletal: [ ] negative [ ] back pain [ ] myalgias [ ] arthralgias [ ] fracture  Skin: [ ] negative [ ] rash [ ] itch  Neurological: [ ] negative [ ] headache [ ] dizziness [ ] syncope [ ] weakness [ ] numbness  Psychiatric: [ ] negative [ ] anxiety [ ] depression  Endocrine: [ ] negative [ ] diabetes [ ] thyroid problem  Hematologic/Lymphatic: [ ] negative [ ] anemia [ ] bleeding problem  Allergic/Immunologic: [ ] negative [ ] itchy eyes [ ] nasal discharge [ ] hives [ ] angioedema  [ ] All other systems negative  [X] Unable to assess ROS because ___intubated and sedated_____    OBJECTIVE:  ICU Vital Signs Last 24 Hrs  T(C): 36.8 (13 Oct 2018 04:00), Max: 37.7 (12 Oct 2018 08:00)  T(F): 98.3 (13 Oct 2018 04:00), Max: 99.9 (12 Oct 2018 08:00)  HR: 107 (13 Oct 2018 07:00) (58 - 130)  BP: 142/67 (13 Oct 2018 07:00) (39/19 - 195/150)  BP(mean): 96 (13 Oct 2018 07:00) (24 - 169)  ABP: --  ABP(mean): --  RR: 21 (13 Oct 2018 07:00) (14 - 33)  SpO2: 100% (13 Oct 2018 07:00) (89% - 100%)    Mode: AC/ CMV (Assist Control/ Continuous Mandatory Ventilation), RR (machine): 14, TV (machine): 400, FiO2: 30, PEEP: 5, ITime: 1, MAP: 10, PIP: 20    10- @ 07:01  -  10-13 @ 07:00  --------------------------------------------------------  IN: 2466 mL / OUT: 605 mL / NET: 1861 mL      CAPILLARY BLOOD GLUCOSE      POCT Blood Glucose.: 135 mg/dL (12 Oct 2018 05:44)      PHYSICAL EXAM:  General: intubated and sedated  HEENT: PEERL, ET and OG tube in place  Lymph Nodes: no cervical lymphadenopathy  Neck: No JVD. R IJ in placed without erythema/edema/discharge  Respiratory: CTA bilaterally  Cardiovascular: irreg, irreg without murmur rub or gallop  Abdomen: nondistended, normoactive BS  Extremities: decerebrate LUE, brace on LLE  Skin: no skin breakdown. Eccymosis under left eye  Neurological: sedated, withdraws to noxious stimuli, + gag.  Psychiatry: sedated    LINES: [facility], peripheral IVs    HOSPITAL MEDICATIONS:  Standing Meds:  acyclovir IVPB      acyclovir IVPB 210 milliGRAM(s) IV Intermittent every 8 hours  cefTRIAXone   IVPB 2 Gram(s) IV Intermittent every 24 hours  cefTRIAXone   IVPB      dexmedetomidine Infusion 0.2 MICROgram(s)/kG/Hr IV Continuous <Continuous>  dextrose 5%. 1000 milliLiter(s) IV Continuous <Continuous>  dextrose 50% Injectable 12.5 Gram(s) IV Push once  dextrose 50% Injectable 25 Gram(s) IV Push once  dextrose 50% Injectable 25 Gram(s) IV Push once  heparin  Infusion.  Unit(s)/Hr IV Continuous <Continuous>  levETIRAcetam  IVPB 500 milliGRAM(s) IV Intermittent every 12 hours  norepinephrine Infusion 0.05 MICROgram(s)/kG/Min IV Continuous <Continuous>  phenylephrine    Infusion 0.4 MICROgram(s)/kG/Min IV Continuous <Continuous>  polyethylene glycol 3350 17 Gram(s) Oral daily  propofol Infusion 5 MICROgram(s)/kG/Min IV Continuous <Continuous>  senna Syrup 10 milliLiter(s) Oral at bedtime  vancomycin  IVPB 1000 milliGRAM(s) IV Intermittent every 12 hours      PRN Meds:  acetaminophen   Tablet .. 650 milliGRAM(s) Oral every 6 hours PRN  ALBUTerol/ipratropium for Nebulization 3 milliLiter(s) Nebulizer every 6 hours PRN  dextrose 40% Gel 15 Gram(s) Oral once PRN  glucagon  Injectable 1 milliGRAM(s) IntraMuscular once PRN  haloperidol    Injectable 1 milliGRAM(s) IV Push every 6 hours PRN  heparin  Injectable 3500 Unit(s) IV Push every 6 hours PRN  heparin  Injectable 1500 Unit(s) IV Push every 6 hours PRN      LABS:                        11.4   11.9  )-----------( 206      ( 13 Oct 2018 01:39 )             33.1     Hgb Trend: 11.4<--, 13.5<--, 13.5<--, 15.3<--, 14.3<--  10-13    140  |  109<H>  |  11  ----------------------------<  131<H>  3.6   |  22  |  0.67    Ca    8.3<L>      13 Oct 2018 01:39  Phos  1.9     10-13  Mg     1.8     10-13    TPro  5.7<L>  /  Alb  2.9<L>  /  TBili  0.3  /  DBili  x   /  AST  18  /  ALT  11  /  AlkPhos  88  10-13    Creatinine Trend: 0.67<--, 0.73<--, 0.73<--, 0.73<--, 0.59<--, 0.80<--  PT/INR - ( 11 Oct 2018 15:42 )   PT: 12.3 sec;   INR: 1.13 ratio         PTT - ( 13 Oct 2018 04:34 )  PTT:172.1 sec  Urinalysis Basic - ( 11 Oct 2018 18:27 )    Color: Light Yellow / Appearance: Clear / S.021 / pH: x  Gluc: x / Ketone: Small  / Bili: Negative / Urobili: Negative   Blood: x / Protein: 30 mg/dL / Nitrite: Negative   Leuk Esterase: Negative / RBC: 25 /hpf / WBC 5 /hpf   Sq Epi: x / Non Sq Epi: 1 /hpf / Bacteria: Negative      Arterial Blood Gas:  10-12 @ 13:00  7.39/35/262/21/100/-3.0  ABG lactate: --    Venous Blood Gas:  10-12 @ 12:16  7.33/47/41/24/72  VBG Lactate: --      MICROBIOLOGY:     Culture - CSF with Gram Stain (collected 12 Oct 2018 21:07)  Source: .CSF CSF  Gram Stain (12 Oct 2018 21:42):    polymorphonuclear leukocytes seen per low power field    No organisms seen per oil power field by cytocentrifuge    Culture - Sputum (collected 12 Oct 2018 02:30)  Source: .Sputum Sputum  Gram Stain (12 Oct 2018 15:00):    Moderate polymorphonuclear leukocytes per low power field    Moderate squamous epithelial cells per low power field    Numerous Gram positive cocci in pairs, chains and clusters    Moderate Gram Positive Rods per oil power field    Few Gram Negative Rods per oil power field    Few Gram Negative Diplococci per oil power field  Preliminary Report (12 Oct 2018 19:05):    Normal Respiratory Hayde present    Culture - Blood (collected 10 Oct 2018 21:44)  Source: .Blood Blood-Venous  Preliminary Report (11 Oct 2018 22:01):    No growth to date.    Culture - Blood (collected 10 Oct 2018 21:42)  Source: .Blood Blood-Peripheral  Preliminary Report (11 Oct 2018 22:01):    No growth to date.      RADIOLOGY:  < from: MR Head w/wo IV Cont (10.12.18 @ 16:57) >  IMPRESSION:    MRI BRAIN:    No acute intracranial hemorrhage, mass effect, vasogenic edema, or   evidence of acute territorial infarct.    Chronic right MCA territory infarct with ex vacuo dilatation of the right   lateral ventricle. Chronic hemorrhage in the region of the right basal   ganglia.     No abnormal parenchymal or leptomeningeal enhancement.    MRA BRAIN:    Significantly limited by patient motion and slab artifact.    Evaluation for flow-limiting stenosis or vascular aneurysm is markedly   limited.    MRA NECK:    No flow-limiting stenosisor evidence of arterial dissection. Carotid   bifurcations and origins of the common carotid and vertebral arteries   unremarkable.    < end of copied text >  [ ] Reviewed and interpreted by me

## 2018-10-14 LAB
ALBUMIN SERPL ELPH-MCNC: 2.6 G/DL — LOW (ref 3.3–5)
ALP SERPL-CCNC: 77 U/L — SIGNIFICANT CHANGE UP (ref 40–120)
ALT FLD-CCNC: 11 U/L — SIGNIFICANT CHANGE UP (ref 10–45)
ANION GAP SERPL CALC-SCNC: 10 MMOL/L — SIGNIFICANT CHANGE UP (ref 5–17)
APTT BLD: 72.4 SEC — HIGH (ref 27.5–37.4)
AST SERPL-CCNC: 14 U/L — SIGNIFICANT CHANGE UP (ref 10–40)
BASOPHILS # BLD AUTO: 0 K/UL — SIGNIFICANT CHANGE UP (ref 0–0.2)
BASOPHILS NFR BLD AUTO: 0 % — SIGNIFICANT CHANGE UP (ref 0–2)
BILIRUB SERPL-MCNC: 0.2 MG/DL — SIGNIFICANT CHANGE UP (ref 0.2–1.2)
BUN SERPL-MCNC: 12 MG/DL — SIGNIFICANT CHANGE UP (ref 7–23)
CALCIUM SERPL-MCNC: 7.9 MG/DL — LOW (ref 8.4–10.5)
CHLORIDE SERPL-SCNC: 113 MMOL/L — HIGH (ref 96–108)
CO2 SERPL-SCNC: 21 MMOL/L — LOW (ref 22–31)
CREAT SERPL-MCNC: 0.76 MG/DL — SIGNIFICANT CHANGE UP (ref 0.5–1.3)
EOSINOPHIL # BLD AUTO: 0 K/UL — SIGNIFICANT CHANGE UP (ref 0–0.5)
EOSINOPHIL NFR BLD AUTO: 0.3 % — SIGNIFICANT CHANGE UP (ref 0–6)
GAS PNL BLDA: SIGNIFICANT CHANGE UP
GLUCOSE BLDC GLUCOMTR-MCNC: 137 MG/DL — HIGH (ref 70–99)
GLUCOSE BLDC GLUCOMTR-MCNC: 164 MG/DL — HIGH (ref 70–99)
GLUCOSE SERPL-MCNC: 225 MG/DL — HIGH (ref 70–99)
HAPTOGLOB SERPL-MCNC: 277 MG/DL — HIGH (ref 34–200)
HCT VFR BLD CALC: 29.9 % — LOW (ref 34.5–45)
HGB BLD-MCNC: 9.9 G/DL — LOW (ref 11.5–15.5)
INR BLD: 1.15 RATIO — SIGNIFICANT CHANGE UP (ref 0.88–1.16)
LDH SERPL L TO P-CCNC: 349 U/L — HIGH (ref 50–242)
LYMPHOCYTES # BLD AUTO: 0.3 K/UL — LOW (ref 1–3.3)
LYMPHOCYTES # BLD AUTO: 4.8 % — LOW (ref 13–44)
MAGNESIUM SERPL-MCNC: 2.2 MG/DL — SIGNIFICANT CHANGE UP (ref 1.6–2.6)
MCHC RBC-ENTMCNC: 31.8 PG — SIGNIFICANT CHANGE UP (ref 27–34)
MCHC RBC-ENTMCNC: 33.2 GM/DL — SIGNIFICANT CHANGE UP (ref 32–36)
MCV RBC AUTO: 95.8 FL — SIGNIFICANT CHANGE UP (ref 80–100)
MONOCYTES # BLD AUTO: 0.1 K/UL — SIGNIFICANT CHANGE UP (ref 0–0.9)
MONOCYTES NFR BLD AUTO: 1.4 % — LOW (ref 2–14)
NEUTROPHILS # BLD AUTO: 6.1 K/UL — SIGNIFICANT CHANGE UP (ref 1.8–7.4)
NEUTROPHILS NFR BLD AUTO: 93.5 % — HIGH (ref 43–77)
PHOSPHATE SERPL-MCNC: 2.6 MG/DL — SIGNIFICANT CHANGE UP (ref 2.5–4.5)
PLATELET # BLD AUTO: 210 K/UL — SIGNIFICANT CHANGE UP (ref 150–400)
POTASSIUM SERPL-MCNC: 4.7 MMOL/L — SIGNIFICANT CHANGE UP (ref 3.5–5.3)
POTASSIUM SERPL-SCNC: 4.7 MMOL/L — SIGNIFICANT CHANGE UP (ref 3.5–5.3)
PROT SERPL-MCNC: 5.5 G/DL — LOW (ref 6–8.3)
PROTHROM AB SERPL-ACNC: 12.5 SEC — SIGNIFICANT CHANGE UP (ref 9.8–12.7)
RBC # BLD: 3.05 M/UL — LOW (ref 3.8–5.2)
RBC # BLD: 3.12 M/UL — LOW (ref 3.8–5.2)
RBC # FLD: 11.9 % — SIGNIFICANT CHANGE UP (ref 10.3–14.5)
RETICS #: 61.2 K/UL — SIGNIFICANT CHANGE UP (ref 25–125)
RETICS/RBC NFR: 2 % — SIGNIFICANT CHANGE UP (ref 0.5–2.5)
SODIUM SERPL-SCNC: 144 MMOL/L — SIGNIFICANT CHANGE UP (ref 135–145)
VANCOMYCIN TROUGH SERPL-MCNC: 15 UG/ML — SIGNIFICANT CHANGE UP (ref 10–20)
WBC # BLD: 6.5 K/UL — SIGNIFICANT CHANGE UP (ref 3.8–10.5)
WBC # FLD AUTO: 6.5 K/UL — SIGNIFICANT CHANGE UP (ref 3.8–10.5)

## 2018-10-14 PROCEDURE — 99291 CRITICAL CARE FIRST HOUR: CPT

## 2018-10-14 PROCEDURE — 93970 EXTREMITY STUDY: CPT | Mod: 26

## 2018-10-14 RX ORDER — INSULIN LISPRO 100/ML
VIAL (ML) SUBCUTANEOUS EVERY 6 HOURS
Qty: 0 | Refills: 0 | Status: DISCONTINUED | OUTPATIENT
Start: 2018-10-14 | End: 2018-10-21

## 2018-10-14 RX ORDER — MIDODRINE HYDROCHLORIDE 2.5 MG/1
5 TABLET ORAL EVERY 8 HOURS
Qty: 0 | Refills: 0 | Status: DISCONTINUED | OUTPATIENT
Start: 2018-10-14 | End: 2018-10-15

## 2018-10-14 RX ORDER — MIDODRINE HYDROCHLORIDE 2.5 MG/1
5 TABLET ORAL EVERY 8 HOURS
Qty: 0 | Refills: 0 | Status: DISCONTINUED | OUTPATIENT
Start: 2018-10-14 | End: 2018-10-14

## 2018-10-14 RX ADMIN — Medication 1: at 17:32

## 2018-10-14 RX ADMIN — Medication 250 MILLIGRAM(S): at 12:46

## 2018-10-14 RX ADMIN — Medication 3 MILLILITER(S): at 11:56

## 2018-10-14 RX ADMIN — LEVETIRACETAM 400 MILLIGRAM(S): 250 TABLET, FILM COATED ORAL at 05:35

## 2018-10-14 RX ADMIN — Medication 3 MILLILITER(S): at 00:18

## 2018-10-14 RX ADMIN — Medication 20 MILLIGRAM(S): at 14:50

## 2018-10-14 RX ADMIN — MIDODRINE HYDROCHLORIDE 5 MILLIGRAM(S): 2.5 TABLET ORAL at 12:46

## 2018-10-14 RX ADMIN — MIDODRINE HYDROCHLORIDE 5 MILLIGRAM(S): 2.5 TABLET ORAL at 21:01

## 2018-10-14 RX ADMIN — LEVETIRACETAM 400 MILLIGRAM(S): 250 TABLET, FILM COATED ORAL at 18:50

## 2018-10-14 RX ADMIN — Medication 3 MILLILITER(S): at 17:54

## 2018-10-14 RX ADMIN — PIPERACILLIN AND TAZOBACTAM 25 GRAM(S): 4; .5 INJECTION, POWDER, LYOPHILIZED, FOR SOLUTION INTRAVENOUS at 21:01

## 2018-10-14 RX ADMIN — PIPERACILLIN AND TAZOBACTAM 25 GRAM(S): 4; .5 INJECTION, POWDER, LYOPHILIZED, FOR SOLUTION INTRAVENOUS at 06:25

## 2018-10-14 RX ADMIN — Medication 20 MILLIGRAM(S): at 21:01

## 2018-10-14 RX ADMIN — PIPERACILLIN AND TAZOBACTAM 25 GRAM(S): 4; .5 INJECTION, POWDER, LYOPHILIZED, FOR SOLUTION INTRAVENOUS at 14:50

## 2018-10-14 RX ADMIN — HEPARIN SODIUM 700 UNIT(S)/HR: 5000 INJECTION INTRAVENOUS; SUBCUTANEOUS at 02:10

## 2018-10-14 RX ADMIN — Medication 3 MILLILITER(S): at 05:56

## 2018-10-14 RX ADMIN — PROPOFOL 1.28 MICROGRAM(S)/KG/MIN: 10 INJECTION, EMULSION INTRAVENOUS at 17:17

## 2018-10-14 RX ADMIN — Medication 250 MILLIGRAM(S): at 03:10

## 2018-10-14 RX ADMIN — PROPOFOL 1.28 MICROGRAM(S)/KG/MIN: 10 INJECTION, EMULSION INTRAVENOUS at 21:02

## 2018-10-14 RX ADMIN — Medication 20 MILLIGRAM(S): at 05:35

## 2018-10-14 NOTE — PROGRESS NOTE ADULT - ASSESSMENT
Patient is a 72 yo F with PMHx of COPD, Bronchitis, Afib (on home coumadin), Right basal ganglion infarct (with resulting dysphagia s/p PEG and residual left spastic hemiparesis) who presented to the ED after unwitnessed fall and possible seizure activity s/p intubation. Unclear etiology of encephalopathy.     #Neuro  Seizure  - patient with generalized tremors and subsequent confusion prior to presentation  - CT head x 2 negative for acute changes  - Utox, alcohol and salicylate levels all negative   - started on levetiracetam 500 q12 for seizure prophylaxis per neurology  - vEEG showing potential epileptogenic focus in the right anterior-mid temporal region, however no seizures were seen. Will restart vEEG following MRI  - will f/u with Neuro   R/O CVA  - patient intubated in the ED for worsening mental status, concerning for possible acute stroke  - MRI brain/neck without signs of worsening hemorrhage, edema or infarct, also no enhancement concerning for meningitis. No stenosis/dissection on neck.   - maintain goal SBP of approx 140  - propofol for sedation as patient is unable to tolerate dexmedetomidine gtt    #Resp  - Continue mechanical ventilation  COPD  - patient started on methylprednisolone 20 q8 x5 days. Continue   - Duonebs q6  Small LLL consolidation   - continue Vanc and Zosyn  - RVP panel and urine legionella both negative     #CV  HTN  - Patient with history of hypertension, with SBP in the 220s on admission  - had lotus/hypotensive episode w/ dc of phenylephrine then given norepi with improvement of BP and HR, unclear cause potentially septic vs neurogenic? will wean as tolerated  - will continue to monitor   Atrial Fibrillation  - [id number] of 6  - Coumadin reversed in the ED with K-centra and VitK in the setting of possible trauma  - continue with Hep gtt   - TTE showing mod-severe MR, otherwise unremarkable    #GI  - originally had elevated triglycerides of unclear etiology. Repeat WNL  - CT abd was benign for acute trauma  - c/w tube feeds     #Renal  - stable  - c/w soria  - monitor electrolytes    #Heme/Onc  - stable    #Endo  Hx of DM  - A1C of 5.4 on admission  - although patient is no longer diabetic, will continue FSq6 while receiving steroid therapy  - continue low dose ISS with q6hr finger sticks  - will d/c once extubated     #ID  Leukocytosis  - Neutrophil predominant leukocytosis at 18.6 on admission, now resolved.  - Patient febrile on 10/11 to 38.3, and started on Zosyn for suspected asp PNA  - s/p lumbar puncture which was unremarkable  - sent lumbar fluid for culture/crypto, and blood borrelia, [place], VDRL,   - UA neg, and blood cx NGTD, repeat cultures  - continue to monitor     #DVT ppx  - patient currently anticoagulated on Hep gtt

## 2018-10-14 NOTE — PROGRESS NOTE ADULT - ATTENDING COMMENTS
Pt seen and examined. 73 F with COPD, CVA, L hemiparesis, dysphagia s/p PEG, now admitted post fall, found to have acute resp failure with hypoxia requiring intubation and seizures. Stable on Keppra BID, will repeat VEEG. Bcxs, CSF Cxs and CSF PCR negative. Septic shock likely 2/2 an aspiration pneumonia. Will d-c Vanco, cont Zosyn for 5 days. Unable to wean due to copious secretions.  and daughters updated at bedside.   - Critical Care time Spent: 40 mins Pt seen and examined. 73 F with COPD, CVA, L hemiparesis, dysphagia s/p PEG, now admitted post fall, found to have acute resp failure with hypoxia requiring intubation and seizures. Stable on Keppra BID, will repeat VEEG. Bcxs, CSF Cxs and CSF PCR negative. Septic shock likely 2/2 aspiration pneumonia. Will d-c Vanco, cont Zosyn for 5 days. Unable to wean today due to copious secretions. Cont aggressive chest PT/ pulm toilet, steroids and bronchodilators. BP stable off pressors.  and daughters updated at bedside.   - Critical Care time Spent: 35 mins

## 2018-10-14 NOTE — PROGRESS NOTE ADULT - SUBJECTIVE AND OBJECTIVE BOX
*******************************  Chao TrevinomosTammie, PGY-2  Pager [phone]  *******************************    INTERVAL HPI/OVERNIGHT EVENTS: Patient restarted on Hep gtt. Urine legionella negative, and azithro d/sully. Patient given 500cc NS bolus in an attempt to wean pressors.    SUBJECTIVE: Patient seen and examined at bedside.     ROS could not be assessed as patient is intubated and sedated.     OBJECTIVE:    VITAL SIGNS:  ICU Vital Signs Last 24 Hrs  T(C): 36.9 (14 Oct 2018 04:00), Max: 37.8 (13 Oct 2018 17:00)  T(F): 98.4 (14 Oct 2018 04:00), Max: 100 (13 Oct 2018 17:00)  HR: 83 (14 Oct 2018 07:30) (83 - 128)  BP: 99/47 (14 Oct 2018 07:30) (80/52 - 173/70)  BP(mean): 68 (14 Oct 2018 07:30) (58 - 111)  ABP: --  ABP(mean): --  RR: 14 (14 Oct 2018 07:00) (14 - 30)  SpO2: 100% (14 Oct 2018 07:30) (95% - 100%)    Mode: AC/ CMV (Assist Control/ Continuous Mandatory Ventilation), RR (machine): 14, TV (machine): 450, FiO2: 30, PEEP: 5, ITime: 1, MAP: 8, PIP: 24    10-13 @ 07:01  -  10-14 @ 07:00  --------------------------------------------------------  IN: 3283.1 mL / OUT: 1540 mL / NET: 1743.1 mL      CAPILLARY BLOOD GLUCOSE          PHYSICAL EXAM:  GENERAL: NAD, well-developed  HEAD:  Atraumatic, Normocephalic  EYES: EOMI, PERRLA, conjunctiva and sclera clear  NECK: Supple, No JVD  CHEST/LUNG: Clear to auscultation bilaterally; No wheeze  HEART: Regular rate. Regularly irregular rhythm; No murmurs, rubs, or gallops  ABDOMEN: Soft, Nontender, Nondistended; Bowel sounds present  EXTREMITIES:  2+ Peripheral Pulses. 1+ b/l LE pitting edema   SKIN: No rashes or lesions        MEDICATIONS:  MEDICATIONS  (STANDING):  ALBUTerol/ipratropium for Nebulization 3 milliLiter(s) Nebulizer every 6 hours  dexmedetomidine Infusion 0.2 MICROgram(s)/kG/Hr (2.14 mL/Hr) IV Continuous <Continuous>  dextrose 5%. 1000 milliLiter(s) (50 mL/Hr) IV Continuous <Continuous>  dextrose 50% Injectable 12.5 Gram(s) IV Push once  dextrose 50% Injectable 25 Gram(s) IV Push once  dextrose 50% Injectable 25 Gram(s) IV Push once  heparin  Infusion. 700 Unit(s)/Hr (7 mL/Hr) IV Continuous <Continuous>  levETIRAcetam  IVPB 500 milliGRAM(s) IV Intermittent every 12 hours  methylPREDNISolone sodium succinate Injectable 20 milliGRAM(s) IV Push every 8 hours  norepinephrine Infusion 0.05 MICROgram(s)/kG/Min (4.013 mL/Hr) IV Continuous <Continuous>  phenylephrine    Infusion 0.4 MICROgram(s)/kG/Min (3.21 mL/Hr) IV Continuous <Continuous>  piperacillin/tazobactam IVPB. 3.375 Gram(s) IV Intermittent every 8 hours  polyethylene glycol 3350 17 Gram(s) Oral daily  propofol Infusion 5 MICROgram(s)/kG/Min (1.284 mL/Hr) IV Continuous <Continuous>  senna Syrup 10 milliLiter(s) Oral at bedtime  vancomycin  IVPB 1000 milliGRAM(s) IV Intermittent every 12 hours    MEDICATIONS  (PRN):  acetaminophen   Tablet .. 650 milliGRAM(s) Oral every 6 hours PRN Temp greater or equal to 38C (100.4F)  dextrose 40% Gel 15 Gram(s) Oral once PRN Blood Glucose LESS THAN 70 milliGRAM(s)/deciLiter  glucagon  Injectable 1 milliGRAM(s) IntraMuscular once PRN Glucose <70 milliGRAM(s)/deciLiter  haloperidol    Injectable 1 milliGRAM(s) IV Push every 6 hours PRN Agitation  heparin  Injectable 3500 Unit(s) IV Push every 6 hours PRN For aPTT less than 40  heparin  Injectable 1500 Unit(s) IV Push every 6 hours PRN For aPTT between 40 - 57      ALLERGIES:  Allergies    No Known Allergies    Intolerances        LABS:                        9.9    6.5   )-----------( 210      ( 14 Oct 2018 01:08 )             29.9     10-14    144  |  113<H>  |  12  ----------------------------<  225<H>  4.7   |  21<L>  |  0.76    Ca    7.9<L>      14 Oct 2018 01:08  Phos  2.6     10-14  Mg     2.2     10-14    TPro  5.5<L>  /  Alb  2.6<L>  /  TBili  0.2  /  DBili  x   /  AST  14  /  ALT  11  /  AlkPhos  77  10-14    PT/INR - ( 14 Oct 2018 01:08 )   PT: 12.5 sec;   INR: 1.15 ratio         PTT - ( 14 Oct 2018 01:08 )  PTT:72.4 sec      RADIOLOGY & ADDITIONAL TESTS: Reviewed.

## 2018-10-15 LAB
ALBUMIN SERPL ELPH-MCNC: 2.6 G/DL — LOW (ref 3.3–5)
ALP SERPL-CCNC: 67 U/L — SIGNIFICANT CHANGE UP (ref 40–120)
ALT FLD-CCNC: 10 U/L — SIGNIFICANT CHANGE UP (ref 10–45)
ANION GAP SERPL CALC-SCNC: 9 MMOL/L — SIGNIFICANT CHANGE UP (ref 5–17)
APTT BLD: 62.5 SEC — HIGH (ref 27.5–37.4)
AST SERPL-CCNC: 11 U/L — SIGNIFICANT CHANGE UP (ref 10–40)
BASOPHILS # BLD AUTO: 0.1 K/UL — SIGNIFICANT CHANGE UP (ref 0–0.2)
BASOPHILS NFR BLD AUTO: 0.9 % — SIGNIFICANT CHANGE UP (ref 0–2)
BILIRUB SERPL-MCNC: 0.2 MG/DL — SIGNIFICANT CHANGE UP (ref 0.2–1.2)
BLD GP AB SCN SERPL QL: NEGATIVE — SIGNIFICANT CHANGE UP
BUN SERPL-MCNC: 19 MG/DL — SIGNIFICANT CHANGE UP (ref 7–23)
CALCIUM SERPL-MCNC: 8.7 MG/DL — SIGNIFICANT CHANGE UP (ref 8.4–10.5)
CHLORIDE SERPL-SCNC: 111 MMOL/L — HIGH (ref 96–108)
CO2 SERPL-SCNC: 25 MMOL/L — SIGNIFICANT CHANGE UP (ref 22–31)
CREAT SERPL-MCNC: 0.81 MG/DL — SIGNIFICANT CHANGE UP (ref 0.5–1.3)
CULTURE RESULTS: SIGNIFICANT CHANGE UP
EOSINOPHIL # BLD AUTO: 0 K/UL — SIGNIFICANT CHANGE UP (ref 0–0.5)
EOSINOPHIL NFR BLD AUTO: 0.5 % — SIGNIFICANT CHANGE UP (ref 0–6)
GLUCOSE BLDC GLUCOMTR-MCNC: 111 MG/DL — HIGH (ref 70–99)
GLUCOSE BLDC GLUCOMTR-MCNC: 147 MG/DL — HIGH (ref 70–99)
GLUCOSE BLDC GLUCOMTR-MCNC: 155 MG/DL — HIGH (ref 70–99)
GLUCOSE BLDC GLUCOMTR-MCNC: 165 MG/DL — HIGH (ref 70–99)
GLUCOSE SERPL-MCNC: 178 MG/DL — HIGH (ref 70–99)
HCT VFR BLD CALC: 28.2 % — LOW (ref 34.5–45)
HGB BLD-MCNC: 9.5 G/DL — LOW (ref 11.5–15.5)
INR BLD: 1.11 RATIO — SIGNIFICANT CHANGE UP (ref 0.88–1.16)
LYMPHOCYTES # BLD AUTO: 0.6 K/UL — LOW (ref 1–3.3)
LYMPHOCYTES # BLD AUTO: 5.9 % — LOW (ref 13–44)
MAGNESIUM SERPL-MCNC: 2.2 MG/DL — SIGNIFICANT CHANGE UP (ref 1.6–2.6)
MCHC RBC-ENTMCNC: 32.5 PG — SIGNIFICANT CHANGE UP (ref 27–34)
MCHC RBC-ENTMCNC: 33.7 GM/DL — SIGNIFICANT CHANGE UP (ref 32–36)
MCV RBC AUTO: 96.3 FL — SIGNIFICANT CHANGE UP (ref 80–100)
MONOCYTES # BLD AUTO: 0.9 K/UL — SIGNIFICANT CHANGE UP (ref 0–0.9)
MONOCYTES NFR BLD AUTO: 9 % — SIGNIFICANT CHANGE UP (ref 2–14)
NEUTROPHILS # BLD AUTO: 8 K/UL — HIGH (ref 1.8–7.4)
NEUTROPHILS NFR BLD AUTO: 83.7 % — HIGH (ref 43–77)
PHOSPHATE SERPL-MCNC: 2.9 MG/DL — SIGNIFICANT CHANGE UP (ref 2.5–4.5)
PLATELET # BLD AUTO: 205 K/UL — SIGNIFICANT CHANGE UP (ref 150–400)
POTASSIUM SERPL-MCNC: 4.3 MMOL/L — SIGNIFICANT CHANGE UP (ref 3.5–5.3)
POTASSIUM SERPL-SCNC: 4.3 MMOL/L — SIGNIFICANT CHANGE UP (ref 3.5–5.3)
PROT SERPL-MCNC: 5.4 G/DL — LOW (ref 6–8.3)
PROTHROM AB SERPL-ACNC: 12.1 SEC — SIGNIFICANT CHANGE UP (ref 9.8–12.7)
RBC # BLD: 2.93 M/UL — LOW (ref 3.8–5.2)
RBC # FLD: 12 % — SIGNIFICANT CHANGE UP (ref 10.3–14.5)
RH IG SCN BLD-IMP: POSITIVE — SIGNIFICANT CHANGE UP
SODIUM SERPL-SCNC: 145 MMOL/L — SIGNIFICANT CHANGE UP (ref 135–145)
SPECIMEN SOURCE: SIGNIFICANT CHANGE UP
VDRL CSF-TITR: NEGATIVE — SIGNIFICANT CHANGE UP
WBC # BLD: 9.5 K/UL — SIGNIFICANT CHANGE UP (ref 3.8–10.5)
WBC # FLD AUTO: 9.5 K/UL — SIGNIFICANT CHANGE UP (ref 3.8–10.5)

## 2018-10-15 PROCEDURE — 99233 SBSQ HOSP IP/OBS HIGH 50: CPT | Mod: GC

## 2018-10-15 RX ORDER — LEVETIRACETAM 250 MG/1
500 TABLET, FILM COATED ORAL EVERY 12 HOURS
Qty: 0 | Refills: 0 | Status: DISCONTINUED | OUTPATIENT
Start: 2018-10-15 | End: 2018-10-21

## 2018-10-15 RX ORDER — HYDRALAZINE HCL 50 MG
10 TABLET ORAL ONCE
Qty: 0 | Refills: 0 | Status: COMPLETED | OUTPATIENT
Start: 2018-10-15 | End: 2018-10-15

## 2018-10-15 RX ORDER — SODIUM CHLORIDE 9 MG/ML
1000 INJECTION, SOLUTION INTRAVENOUS
Qty: 0 | Refills: 0 | Status: DISCONTINUED | OUTPATIENT
Start: 2018-10-15 | End: 2018-10-18

## 2018-10-15 RX ADMIN — HEPARIN SODIUM 700 UNIT(S)/HR: 5000 INJECTION INTRAVENOUS; SUBCUTANEOUS at 00:56

## 2018-10-15 RX ADMIN — Medication 1: at 05:09

## 2018-10-15 RX ADMIN — MIDODRINE HYDROCHLORIDE 5 MILLIGRAM(S): 2.5 TABLET ORAL at 05:03

## 2018-10-15 RX ADMIN — Medication 10 MILLIGRAM(S): at 19:28

## 2018-10-15 RX ADMIN — Medication 1: at 00:56

## 2018-10-15 RX ADMIN — Medication 3 MILLILITER(S): at 05:26

## 2018-10-15 RX ADMIN — Medication 20 MILLIGRAM(S): at 13:50

## 2018-10-15 RX ADMIN — LEVETIRACETAM 400 MILLIGRAM(S): 250 TABLET, FILM COATED ORAL at 16:36

## 2018-10-15 RX ADMIN — PIPERACILLIN AND TAZOBACTAM 25 GRAM(S): 4; .5 INJECTION, POWDER, LYOPHILIZED, FOR SOLUTION INTRAVENOUS at 05:03

## 2018-10-15 RX ADMIN — SODIUM CHLORIDE 50 MILLILITER(S): 9 INJECTION, SOLUTION INTRAVENOUS at 10:45

## 2018-10-15 RX ADMIN — PIPERACILLIN AND TAZOBACTAM 25 GRAM(S): 4; .5 INJECTION, POWDER, LYOPHILIZED, FOR SOLUTION INTRAVENOUS at 13:50

## 2018-10-15 RX ADMIN — Medication 20 MILLIGRAM(S): at 05:03

## 2018-10-15 RX ADMIN — Medication 3 MILLILITER(S): at 00:11

## 2018-10-15 RX ADMIN — Medication 3 MILLILITER(S): at 11:26

## 2018-10-15 RX ADMIN — PIPERACILLIN AND TAZOBACTAM 25 GRAM(S): 4; .5 INJECTION, POWDER, LYOPHILIZED, FOR SOLUTION INTRAVENOUS at 22:46

## 2018-10-15 RX ADMIN — Medication 3 MILLILITER(S): at 18:26

## 2018-10-15 RX ADMIN — LEVETIRACETAM 400 MILLIGRAM(S): 250 TABLET, FILM COATED ORAL at 05:03

## 2018-10-15 NOTE — AIRWAY REMOVAL NOTE  ADULT & PEDS - ARTIFICAL AIRWAY REMOVAL COMMENTS
Written order for extubation verified. The patient was identified by full name and birth date compared to the identification band. Present during the procedure was aidee gomez RN

## 2018-10-15 NOTE — PROGRESS NOTE ADULT - SUBJECTIVE AND OBJECTIVE BOX
CHIEF COMPLAINT: "No"    Interval Events:    Overnight patient extubated. Patient seen and examined. Responding with 'No' to all questions.      REVIEW OF SYSTEMS:  Constitutional: [ ] negative [ ] fevers [ ] chills [ ] weight loss [ ] weight gain  HEENT: [ ] negative [ ] dry eyes [ ] eye irritation [ ] postnasal drip [ ] nasal congestion  CV: [ ] negative  [ ] chest pain [ ] orthopnea [ ] palpitations [ ] murmur  Resp: [ ] negative [ ] cough [ ] shortness of breath [ ] dyspnea [ ] wheezing [ ] sputum [ ] hemoptysis  GI: [ ] negative [ ] nausea [ ] vomiting [ ] diarrhea [ ] constipation [ ] abd pain [ ] dysphagia   : [ ] negative [ ] dysuria [ ] nocturia [ ] hematuria [ ] increased urinary frequency  Musculoskeletal: [ ] negative [ ] back pain [ ] myalgias [ ] arthralgias [ ] fracture  Skin: [ ] negative [ ] rash [ ] itch  Neurological: [ ] negative [ ] headache [ ] dizziness [ ] syncope [ ] weakness [ ] numbness  Psychiatric: [ ] negative [ ] anxiety [ ] depression  Endocrine: [ ] negative [ ] diabetes [ ] thyroid problem  Hematologic/Lymphatic: [ ] negative [ ] anemia [ ] bleeding problem  Allergic/Immunologic: [ ] negative [ ] itchy eyes [ ] nasal discharge [ ] hives [ ] angioedema  [ ] All other systems negative  [X ] Unable to assess ROS because Encephalopathy    OBJECTIVE:  ICU Vital Signs Last 24 Hrs  T(C): 37 (15 Oct 2018 04:00), Max: 37.3 (14 Oct 2018 20:00)  T(F): 98.6 (15 Oct 2018 04:00), Max: 99.1 (14 Oct 2018 20:00)  HR: 100 (15 Oct 2018 07:00) (83 - 124)  BP: 156/69 (15 Oct 2018 07:00) (105/54 - 171/79)  BP(mean): 99 (15 Oct 2018 07:00) (76 - 116)  ABP: --  ABP(mean): --  RR: 20 (15 Oct 2018 07:00) (14 - 28)  SpO2: 100% (15 Oct 2018 07:00) (94% - 100%)    Mode: CPAP with PS, FiO2: 30, PEEP: 5, PS: 5, MAP: 7, PIP: 11    10-14 @ 07:01  -  10-15 @ 07:00  --------------------------------------------------------  IN: 1811.7 mL / OUT: 1165 mL / NET: 646.7 mL      CAPILLARY BLOOD GLUCOSE      POCT Blood Glucose.: 155 mg/dL (15 Oct 2018 05:09)      PHYSICAL EXAM:  General: NAD  HEENT: Ecchymosis to L orbit. significant secretions in oropharynx  Lymph Nodes: No ant or post C LAD  Neck: supple, No JVD  Respiratory:  CTA b/l No WRR  Cardiovascular: RRR S1/S2 No MGR. No LE edema  Abdomen: s/nt/nd BS+  Extremities: AROM  Skin: ecchymosis over face  Neurological: non-focal, AOX0  Psychiatry:    LINES:    HOSPITAL MEDICATIONS:  heparin  Infusion. 700 Unit(s)/Hr IV Continuous <Continuous>    piperacillin/tazobactam IVPB. 3.375 Gram(s) IV Intermittent every 8 hours    midodrine 5 milliGRAM(s) Oral every 8 hours    dextrose 40% Gel 15 Gram(s) Oral once PRN  dextrose 50% Injectable 12.5 Gram(s) IV Push once  dextrose 50% Injectable 25 Gram(s) IV Push once  dextrose 50% Injectable 25 Gram(s) IV Push once  glucagon  Injectable 1 milliGRAM(s) IntraMuscular once PRN  insulin lispro (HumaLOG) corrective regimen sliding scale   SubCutaneous every 6 hours  methylPREDNISolone sodium succinate Injectable 20 milliGRAM(s) IV Push every 8 hours    ALBUTerol/ipratropium for Nebulization 3 milliLiter(s) Nebulizer every 6 hours    acetaminophen   Tablet .. 650 milliGRAM(s) Oral every 6 hours PRN  haloperidol    Injectable 1 milliGRAM(s) IV Push every 6 hours PRN          dextrose 5%. 1000 milliLiter(s) IV Continuous <Continuous>            LABS:                        9.5    9.5   )-----------( 205      ( 15 Oct 2018 00:12 )             28.2     Hgb Trend: 9.5<--, 9.9<--, 10.6<--, 11.4<--, 13.5<--  10-15    145  |  111<H>  |  19  ----------------------------<  178<H>  4.3   |  25  |  0.81    Ca    8.7      15 Oct 2018 00:12  Phos  2.9     10-15  Mg     2.2     10-15    TPro  5.4<L>  /  Alb  2.6<L>  /  TBili  0.2  /  DBili  x   /  AST  11  /  ALT  10  /  AlkPhos  67  10-15    Creatinine Trend: 0.81<--, 0.76<--, 0.67<--, 0.73<--, 0.73<--, 0.73<--  PT/INR - ( 15 Oct 2018 00:12 )   PT: 12.1 sec;   INR: 1.11 ratio         PTT - ( 15 Oct 2018 00:12 )  PTT:62.5 sec    Arterial Blood Gas:  10-14 @ 15:28  7.39/43/64/26/93/.9  ABG lactate: --  Arterial Blood Gas:  10-13 @ 15:36  7.43/35/129/23/100/-.3  ABG lactate: --        MICROBIOLOGY:     RADIOLOGY:  [ ] Reviewed and interpreted by me    EKG:

## 2018-10-15 NOTE — CHART NOTE - NSCHARTNOTEFT_GEN_A_CORE
MICU Transfer Note    Transfer from: MICU    Transfer to: (X) Medicine    (  ) Telemetry     (   ) RCU        (    ) Palliative         (   ) Stroke Unit          (   ) __________________    Accepting Physician:  Signout given to:     MICU COURSE:    Patient is a 73F with COPD, CVA (residual L hemiparesis), h/o dysphagia and PEG (PEG removed in past) presented on 10/10 following witnessed seizure and fall, found to be hypoxic on presentation requiring intubation. Patient has had no further seizures since receiving Keppra on multiple EEG reads. Patient also with question of aspiration PNA, for which she was started on stress dose steroids, and Zosyn. While in ICU, infectious w/u included blood culture, LP with culture, which have remained negative. Patient with copious secretions, improved with nebulizer therapy, and was successfully extubated on 10/15/2018.    PLAN:     #Neuro  - c/w Keppra, f/u further neuro recs and will need outpatient neuro f/u for seizure management  - Pending s/s prior to restarting oral diet  - Patient with some encephalopathy, use delirium precautions  - Seizure precautions, fall precautions    #Pulmonary  - s/p extubation for hypoxic failure during seizures  - Bedside U/S c/w consolidation possibly asp PNA, c/w Zosyn for 5 day course  - c/w Duonebs for secretions    #CV  - Patient hypotensive while in ICU, and was started on stress dose steroids, midodrine  - midodrine d/c 10/15 for hypertension  - c/w Solu-medrol 20 mg IV Q8H until 10/17. Can either d/c or wean at that time, pending clinical status    #Metabolic  - No acute issues, Cr stable        FOR FOLLOW UP:  [ ] Speech and swallow eval  [ ] Neuro recs   [ ] PT for dispo      RG, PGY-3 MICU Transfer Note    Transfer from: MICU    Transfer to: (X) Medicine    (  ) Telemetry     (   ) RCU        (    ) Palliative         (   ) Stroke Unit          (   ) __________________    Accepting Physician: Dr. Styles  Signout given to:     MICU COURSE:    Patient is a 73F with COPD, CVA (residual L hemiparesis), h/o dysphagia and PEG (PEG removed in past) presented on 10/10 following witnessed seizure and fall, found to be hypoxic on presentation requiring intubation. Patient has had no further seizures since receiving Keppra on multiple EEG reads. Patient also with question of aspiration PNA, for which she was started on stress dose steroids, and Zosyn. While in ICU, infectious w/u included blood culture, LP with culture, which have remained negative. Patient with copious secretions, improved with nebulizer therapy, and was successfully extubated on 10/15/2018.    PLAN:     #Neuro  - c/w Keppra, f/u further neuro recs and will need outpatient neuro f/u for seizure management  - Pending s/s prior to restarting oral diet  - Patient with some encephalopathy, use delirium precautions  - Seizure precautions, fall precautions    #Pulmonary  - s/p extubation for hypoxic failure during seizures  - Bedside U/S c/w consolidation possibly asp PNA, c/w Zosyn for 5 day course  - c/w Duonebs for secretions    #CV  - Patient hypotensive while in ICU, and was started on stress dose steroids, midodrine  - midodrine d/c 10/15 for hypertension  - c/w Solu-medrol 20 mg IV Q8H until 10/17. Can either d/c or wean at that time, pending clinical status    #Metabolic  - No acute issues, Cr stable        FOR FOLLOW UP:  [ ] Speech and swallow eval  [ ] Neuro recs   [ ] PT for dispo      [facility], PGY-3

## 2018-10-15 NOTE — PROGRESS NOTE ADULT - ASSESSMENT
Patient is a 74 yo F with PMHx of COPD, Bronchitis, Afib (on home coumadin), Right basal ganglion infarct (with resulting dysphagia s/p PEG and residual left spastic hemiparesis) who presented to the ED after unwitnessed fall and possible seizure activity s/p extubation 10/15.     #Neuro  Seizure  - patient with generalized tremors and subsequent confusion prior to presentation  - CT head x 2 negative for acute changes  - Utox, alcohol and salicylate levels all negative   - started on levetiracetam 500 q12 for seizure prophylaxis per neurology  - vEEG showing potential epileptogenic focus in the right anterior-mid temporal region, however no seizures were seen after multiple EEG reads  - Keppra d/c, will continue to monitor mentation  - MRI brain/neck without signs of worsening hemorrhage, edema or infarct, also no enhancement concerning for meningitis. No stenosis/dissection on neck.     #Resp  - extubated today, c/w pulmonary toilet therapy  -c/w methylprednisolone 20 q8 x day 3/5 for ?COPD exacerbation along with Zosyn for asp PNA  - Duonebs q6  - RVP panel and urine legionella both negative     #CV  HTN  - Patient with history of hypertension, with SBP in the 220s on admission however since admission, has required pressors for hypotension, currently on midodrine 5 Q8H, with SBP now in 150s, will wean mido as tolerated  - Unclear if neurologic etiology, or possible that hypotension was 2/2 medications  - AF with [id number] of 6  - Coumadin reversed in the ED with K-centra and VitK in the setting of possible trauma  - continue with Hep gtt will need to re-initiate coumadin, once tolerating PO diet    #GI  - CT abd was benign for acute trauma  - s/s eval prior to starting oral feeds     #Renal  - stable  - c/w soria  - monitor electrolytes    #Heme/Onc  - stable    #Endo  Hx of DM  - A1C of 5.4 on admission  - although patient is no longer diabetic, will continue FSq6 while receiving steroid therapy  - continue low dose ISS with q6hr finger sticks    #ID  - Patient febrile on 10/11 to 38.3, and started on Zosyn for suspected asp PNA  - s/p lumbar puncture which was unremarkable  - sent lumbar fluid for culture/crypto, and blood borrelia, [place], VDRL,   - UA neg, and blood cx NGTD, repeat cultures  - continue to monitor     #DVT ppx  - patient currently anticoagulated on Hep gtt Patient is a 74 yo F with PMHx of COPD, Bronchitis, Afib (on home coumadin), Right basal ganglion infarct (with resulting dysphagia s/p PEG and residual left spastic hemiparesis) who presented to the ED after unwitnessed fall and possible seizure activity s/p extubation 10/15.     #Neuro  Seizure  - patient with generalized tremors and subsequent confusion prior to presentation  - CT head x 2 negative for acute changes  - Utox, alcohol and salicylate levels all negative   - started on levetiracetam 500 q12 for seizure prophylaxis per neurology  - vEEG showing potential epileptogenic focus in the right anterior-mid temporal region, however no seizures were seen after multiple EEG reads  - Keppra d/c, will continue to monitor mentation  - MRI brain/neck without signs of worsening hemorrhage, edema or infarct, also no enhancement concerning for meningitis. No stenosis/dissection on neck.     #Resp  - extubated today, c/w pulmonary toilet therapy  -c/w Zosyn for asp PNA  - Duonebs q6  - RVP panel and urine legionella both negative     #CV  HTN  - Patient with history of hypertension, with SBP in the 220s on admission however since admission, has required pressors for hypotension, currently on midodrine 5 Q8H and solu-medrol stress dose, with SBP now in 150s,  - Unclear if neurologic etiology, or possible that hypotension was 2/2 medications  - AF with [id number] of 6  - Coumadin reversed in the ED with K-centra and VitK in the setting of possible trauma  - continue with Hep gtt will need to re-initiate coumadin, once tolerating PO diet  - As shock state resolving, d/c midodrine, and c/w solu-medrol stress dosing (20mg IV Q8H) for total 5 days (3/5)    #GI  - CT abd was benign for acute trauma  - s/s eval prior to starting oral feeds     #Renal  - stable  - c/w soria  - monitor electrolytes    #Heme/Onc  - stable    #Endo  Hx of DM  - A1C of 5.4 on admission  - although patient is no longer diabetic, will continue FSq6 while receiving steroid therapy  - continue low dose ISS with q6hr finger sticks    #ID  - Patient febrile on 10/11 to 38.3, and started on Zosyn for suspected asp PNA  - s/p lumbar puncture which was unremarkable  - sent lumbar fluid for culture/crypto, and blood borrelia, [place], VDRL,   - UA neg, and blood cx NGTD, repeat cultures  - continue to monitor     #DVT ppx  - patient currently anticoagulated on Hep gtt

## 2018-10-16 ENCOUNTER — TRANSCRIPTION ENCOUNTER (OUTPATIENT)
Age: 73
End: 2018-10-16

## 2018-10-16 DIAGNOSIS — J44.1 CHRONIC OBSTRUCTIVE PULMONARY DISEASE WITH (ACUTE) EXACERBATION: ICD-10-CM

## 2018-10-16 DIAGNOSIS — I63.9 CEREBRAL INFARCTION, UNSPECIFIED: ICD-10-CM

## 2018-10-16 DIAGNOSIS — J15.9 UNSPECIFIED BACTERIAL PNEUMONIA: ICD-10-CM

## 2018-10-16 DIAGNOSIS — I82.409 ACUTE EMBOLISM AND THROMBOSIS OF UNSPECIFIED DEEP VEINS OF UNSPECIFIED LOWER EXTREMITY: ICD-10-CM

## 2018-10-16 DIAGNOSIS — R56.9 UNSPECIFIED CONVULSIONS: ICD-10-CM

## 2018-10-16 DIAGNOSIS — J96.00 ACUTE RESPIRATORY FAILURE, UNSPECIFIED WHETHER WITH HYPOXIA OR HYPERCAPNIA: ICD-10-CM

## 2018-10-16 DIAGNOSIS — J44.9 CHRONIC OBSTRUCTIVE PULMONARY DISEASE, UNSPECIFIED: ICD-10-CM

## 2018-10-16 LAB
ALBUMIN SERPL ELPH-MCNC: 3.4 G/DL — SIGNIFICANT CHANGE UP (ref 3.3–5)
ALP SERPL-CCNC: 81 U/L — SIGNIFICANT CHANGE UP (ref 40–120)
ALT FLD-CCNC: 29 U/L — SIGNIFICANT CHANGE UP (ref 10–45)
ANION GAP SERPL CALC-SCNC: 13 MMOL/L — SIGNIFICANT CHANGE UP (ref 5–17)
APTT BLD: 46.5 SEC — HIGH (ref 27.5–37.4)
APTT BLD: 68.4 SEC — HIGH (ref 27.5–37.4)
APTT BLD: 86.1 SEC — HIGH (ref 27.5–37.4)
AST SERPL-CCNC: 40 U/L — SIGNIFICANT CHANGE UP (ref 10–40)
BASE EXCESS BLDA CALC-SCNC: 3.8 MMOL/L — HIGH (ref -2–2)
BASOPHILS # BLD AUTO: 0 K/UL — SIGNIFICANT CHANGE UP (ref 0–0.2)
BASOPHILS NFR BLD AUTO: 0.1 % — SIGNIFICANT CHANGE UP (ref 0–2)
BILIRUB SERPL-MCNC: 0.4 MG/DL — SIGNIFICANT CHANGE UP (ref 0.2–1.2)
BUN SERPL-MCNC: 16 MG/DL — SIGNIFICANT CHANGE UP (ref 7–23)
CALCIUM SERPL-MCNC: 9 MG/DL — SIGNIFICANT CHANGE UP (ref 8.4–10.5)
CHLORIDE SERPL-SCNC: 103 MMOL/L — SIGNIFICANT CHANGE UP (ref 96–108)
CO2 BLDA-SCNC: 28 MMOL/L — SIGNIFICANT CHANGE UP (ref 22–30)
CO2 SERPL-SCNC: 26 MMOL/L — SIGNIFICANT CHANGE UP (ref 22–31)
CREAT SERPL-MCNC: 0.7 MG/DL — SIGNIFICANT CHANGE UP (ref 0.5–1.3)
EOSINOPHIL # BLD AUTO: 0 K/UL — SIGNIFICANT CHANGE UP (ref 0–0.5)
EOSINOPHIL NFR BLD AUTO: 0.5 % — SIGNIFICANT CHANGE UP (ref 0–6)
GAS PNL BLDA: SIGNIFICANT CHANGE UP
GLUCOSE BLDC GLUCOMTR-MCNC: 156 MG/DL — HIGH (ref 70–99)
GLUCOSE BLDC GLUCOMTR-MCNC: 164 MG/DL — HIGH (ref 70–99)
GLUCOSE BLDC GLUCOMTR-MCNC: 215 MG/DL — HIGH (ref 70–99)
GLUCOSE SERPL-MCNC: 170 MG/DL — HIGH (ref 70–99)
HCO3 BLDA-SCNC: 27 MMOL/L — SIGNIFICANT CHANGE UP (ref 21–29)
HCT VFR BLD CALC: 36.3 % — SIGNIFICANT CHANGE UP (ref 34.5–45)
HGB BLD-MCNC: 12.1 G/DL — SIGNIFICANT CHANGE UP (ref 11.5–15.5)
HOROWITZ INDEX BLDA+IHG-RTO: 35 — SIGNIFICANT CHANGE UP
INR BLD: 1.05 RATIO — SIGNIFICANT CHANGE UP (ref 0.88–1.16)
LYMPHOCYTES # BLD AUTO: 0.8 K/UL — LOW (ref 1–3.3)
LYMPHOCYTES # BLD AUTO: 7.8 % — LOW (ref 13–44)
MAGNESIUM SERPL-MCNC: 1.9 MG/DL — SIGNIFICANT CHANGE UP (ref 1.6–2.6)
MCHC RBC-ENTMCNC: 31.9 PG — SIGNIFICANT CHANGE UP (ref 27–34)
MCHC RBC-ENTMCNC: 33.3 GM/DL — SIGNIFICANT CHANGE UP (ref 32–36)
MCV RBC AUTO: 95.7 FL — SIGNIFICANT CHANGE UP (ref 80–100)
MONOCYTES # BLD AUTO: 0.6 K/UL — SIGNIFICANT CHANGE UP (ref 0–0.9)
MONOCYTES NFR BLD AUTO: 5.4 % — SIGNIFICANT CHANGE UP (ref 2–14)
NEUTROPHILS # BLD AUTO: 9.1 K/UL — HIGH (ref 1.8–7.4)
NEUTROPHILS NFR BLD AUTO: 86.2 % — HIGH (ref 43–77)
PCO2 BLDA: 36 MMHG — SIGNIFICANT CHANGE UP (ref 32–46)
PH BLDA: 7.48 — HIGH (ref 7.35–7.45)
PHOSPHATE SERPL-MCNC: 2.8 MG/DL — SIGNIFICANT CHANGE UP (ref 2.5–4.5)
PLATELET # BLD AUTO: 229 K/UL — SIGNIFICANT CHANGE UP (ref 150–400)
PO2 BLDA: 107 MMHG — SIGNIFICANT CHANGE UP (ref 74–108)
POTASSIUM SERPL-MCNC: 4 MMOL/L — SIGNIFICANT CHANGE UP (ref 3.5–5.3)
POTASSIUM SERPL-SCNC: 4 MMOL/L — SIGNIFICANT CHANGE UP (ref 3.5–5.3)
PROT SERPL-MCNC: 6.8 G/DL — SIGNIFICANT CHANGE UP (ref 6–8.3)
PROTHROM AB SERPL-ACNC: 11.3 SEC — SIGNIFICANT CHANGE UP (ref 9.8–12.7)
RBC # BLD: 3.8 M/UL — SIGNIFICANT CHANGE UP (ref 3.8–5.2)
RBC # FLD: 11.9 % — SIGNIFICANT CHANGE UP (ref 10.3–14.5)
SAO2 % BLDA: 98 % — HIGH (ref 92–96)
SODIUM SERPL-SCNC: 142 MMOL/L — SIGNIFICANT CHANGE UP (ref 135–145)
WBC # BLD: 10.6 K/UL — HIGH (ref 3.8–10.5)
WBC # FLD AUTO: 10.6 K/UL — HIGH (ref 3.8–10.5)

## 2018-10-16 PROCEDURE — 71045 X-RAY EXAM CHEST 1 VIEW: CPT | Mod: 26

## 2018-10-16 PROCEDURE — 93010 ELECTROCARDIOGRAM REPORT: CPT

## 2018-10-16 PROCEDURE — 99222 1ST HOSP IP/OBS MODERATE 55: CPT

## 2018-10-16 RX ORDER — METOPROLOL TARTRATE 50 MG
10 TABLET ORAL EVERY 6 HOURS
Qty: 0 | Refills: 0 | Status: DISCONTINUED | OUTPATIENT
Start: 2018-10-16 | End: 2018-10-21

## 2018-10-16 RX ORDER — VANCOMYCIN HCL 1 G
VIAL (EA) INTRAVENOUS
Qty: 0 | Refills: 0 | Status: DISCONTINUED | OUTPATIENT
Start: 2018-10-16 | End: 2018-10-17

## 2018-10-16 RX ORDER — VANCOMYCIN HCL 1 G
1000 VIAL (EA) INTRAVENOUS EVERY 12 HOURS
Qty: 0 | Refills: 0 | Status: DISCONTINUED | OUTPATIENT
Start: 2018-10-17 | End: 2018-10-17

## 2018-10-16 RX ORDER — ONDANSETRON 8 MG/1
4 TABLET, FILM COATED ORAL ONCE
Qty: 0 | Refills: 0 | Status: COMPLETED | OUTPATIENT
Start: 2018-10-16 | End: 2018-10-16

## 2018-10-16 RX ORDER — VANCOMYCIN HCL 1 G
1000 VIAL (EA) INTRAVENOUS ONCE
Qty: 0 | Refills: 0 | Status: COMPLETED | OUTPATIENT
Start: 2018-10-16 | End: 2018-10-16

## 2018-10-16 RX ORDER — WARFARIN SODIUM 2.5 MG/1
2 TABLET ORAL ONCE
Qty: 0 | Refills: 0 | Status: COMPLETED | OUTPATIENT
Start: 2018-10-16 | End: 2018-10-16

## 2018-10-16 RX ADMIN — HEPARIN SODIUM 800 UNIT(S)/HR: 5000 INJECTION INTRAVENOUS; SUBCUTANEOUS at 15:28

## 2018-10-16 RX ADMIN — Medication 3 MILLILITER(S): at 12:52

## 2018-10-16 RX ADMIN — HEPARIN SODIUM 800 UNIT(S)/HR: 5000 INJECTION INTRAVENOUS; SUBCUTANEOUS at 21:51

## 2018-10-16 RX ADMIN — Medication 3 MILLILITER(S): at 05:28

## 2018-10-16 RX ADMIN — Medication 1: at 00:00

## 2018-10-16 RX ADMIN — PIPERACILLIN AND TAZOBACTAM 25 GRAM(S): 4; .5 INJECTION, POWDER, LYOPHILIZED, FOR SOLUTION INTRAVENOUS at 13:24

## 2018-10-16 RX ADMIN — Medication 3 MILLILITER(S): at 00:00

## 2018-10-16 RX ADMIN — Medication 1: at 12:52

## 2018-10-16 RX ADMIN — HEPARIN SODIUM 800 UNIT(S)/HR: 5000 INJECTION INTRAVENOUS; SUBCUTANEOUS at 08:23

## 2018-10-16 RX ADMIN — Medication 3 MILLILITER(S): at 17:46

## 2018-10-16 RX ADMIN — Medication 2: at 17:46

## 2018-10-16 RX ADMIN — Medication 20 MILLIGRAM(S): at 13:08

## 2018-10-16 RX ADMIN — PIPERACILLIN AND TAZOBACTAM 25 GRAM(S): 4; .5 INJECTION, POWDER, LYOPHILIZED, FOR SOLUTION INTRAVENOUS at 06:06

## 2018-10-16 RX ADMIN — HEPARIN SODIUM 1500 UNIT(S): 5000 INJECTION INTRAVENOUS; SUBCUTANEOUS at 08:25

## 2018-10-16 RX ADMIN — ONDANSETRON 4 MILLIGRAM(S): 8 TABLET, FILM COATED ORAL at 20:16

## 2018-10-16 RX ADMIN — Medication 20 MILLIGRAM(S): at 02:32

## 2018-10-16 RX ADMIN — PIPERACILLIN AND TAZOBACTAM 25 GRAM(S): 4; .5 INJECTION, POWDER, LYOPHILIZED, FOR SOLUTION INTRAVENOUS at 22:36

## 2018-10-16 RX ADMIN — Medication 250 MILLIGRAM(S): at 20:05

## 2018-10-16 RX ADMIN — Medication 1: at 06:34

## 2018-10-16 RX ADMIN — LEVETIRACETAM 400 MILLIGRAM(S): 250 TABLET, FILM COATED ORAL at 17:46

## 2018-10-16 RX ADMIN — LEVETIRACETAM 400 MILLIGRAM(S): 250 TABLET, FILM COATED ORAL at 05:46

## 2018-10-16 NOTE — CONSULT NOTE ADULT - SUBJECTIVE AND OBJECTIVE BOX
Patient is a 73y old  Female who presents with a chief complaint of Fall, AMS (16 Oct 2018 11:26)    From HPI" HPI:  HPI: Patient is a 73y old  Female on coumadin who presented after a fall, unwitnessed.  Patient was reportedly found by family on the ground.  She was reportedly shaking and then confused, but then acted like herself.  Per ER, she had a CT head / c-spine which were negative.  However, after the initial CT scans, she began to have seizure like activity and was intubated for airway protection.  Level 1 trauma was called at time of seizure like activity.      Hospital course:  Patient has had no further seizures since receiving Keppra on multiple EEG reads. Patient also with question of aspiration PNA, for which she was started on stress dose steroids, and Zosyn. While in ICU, infectious w/u included blood culture, LP with culture, which have remained negative. Patient with copious secretions, improved with nebulizer therapy, and was successfully extubated on 10/15/2018.    ID consulted     PAST MEDICAL & SURGICAL HISTORY:  DVT (deep venous thrombosis)  HLD (hyperlipidemia)  COPD (chronic obstructive pulmonary disease)  No significant past surgical history      Social history:   ex  Smoking, no   ETOH,   no   IVDU       FAMILY HISTORY:  No pertinent family history  - Family history of medical problems in all first degree relatives reviewed.None of these were found to be related to patients current illness.    REVIEW OF SYSTEMS  General:	Denies any malaise fatigue or chills. Fevers absent    Skin:No rash  	  Ophthalmologic:Denies any visual complaints,discharge redness or photophobia  	  ENMT:No nasal discharge,headache,sinus congestion or throat pain.No dental complaints    Respiratory and Thorax:+ cough,some sputum no  chest pain.Denies shortness of breath  	  Cardiovascular:	No chest pain,palpitaions or dizziness    Gastrointestinal:	NO nausea,abdominal pain or diarrhea.    Genitourinary:	No dysuria,frequency. No flank pain    Musculoskeletal:	No joint swelling or pain.No weakness    Neurological:No confusion,diziness.No extremity weakness.No bladder or bowel incontinence	    Psychiatric:No delusions or hallucinations	    Hematology/Lymphatics:	No LN swelling.No gum bleeding     Endocrine:	No recent weight gain or loss.No abnormal heat/cold intolerance    Allergic/Immunologic:	No hives or rash   Allergies    No Known Allergies    Intolerances        Antimicrobials:    piperacillin/tazobactam IVPB. 3.375 Gram(s) IV Intermittent every 8 hours-since 10/13    Other medications reviewed      Vital Signs Last 24 Hrs  T(C): 36.7 (16 Oct 2018 10:51), Max: 37.2 (15 Oct 2018 16:00)  T(F): 98.1 (16 Oct 2018 10:51), Max: 98.9 (15 Oct 2018 16:00)  HR: 102 (16 Oct 2018 10:51) (91 - 115)  BP: 144/75 (16 Oct 2018 10:51) (139/72 - 181/86)  BP(mean): 91 (15 Oct 2018 20:00) (91 - 123)  RR: 18 (16 Oct 2018 10:51) (18 - 33)  SpO2: 98% (16 Oct 2018 10:51) (94% - 99%)    PHYSICAL EXAM:Pleasant patient in no acute distress.      Constitutional:Comfortable.Awake and alert  No cachexia     Eyes:PERRL EOMI.NO discharge or conjunctival injection  left facial and periorbital hematoma/ecchymosis    ENMT:No sinus tenderness.No thrush.No pharyngeal exudate or erythema.Fair dental hygiene    Neck:Supple,No LN,no JVD      Respiratory:Good air entry bilaterally,bilateral crackles and wheezes    Cardiovascular:S1 S2 wnl, No murmurs,rub or gallops    Gastrointestinal:Soft BS(+) no tenderness no masses ,No rebound or guarding    Genitourinary:No CVA tendereness         Extremities:No cyanosis,clubbing or edema.  ecchymosis Left forearm     Vascular:peripheral pulses felt    Neurological:AAO X 3,No grossly focal deficits      Lymph Nodes:No palpable LNs    Musculoskeletal:No joint swelling or LOM    Psychiatric:Affect normal.          Labs:                            12.1   10.6  )-----------( 229      ( 16 Oct 2018 07:18 )             36.3     WBC Count: 10.6 (10-16-18 @ 07:18)  WBC Count: 9.5 (10-15-18 @ 00:12)  WBC Count: 6.5 (10-14-18 @ 01:08)  WBC Count: 9.8 (10-13-18 @ 13:51)  WBC Count: 11.9 (10-13-18 @ 01:39)  WBC Count: 18.4 (10-12-18 @ 00:45)  WBC Count: 15.4 (10-11-18 @ 15:42)  WBC Count: 15.0 (10-11-18 @ 01:26)  WBC Count: 18.6 (10-10-18 @ 16:27)  WBC Count: 6.6 (10-10-18 @ 13:00)      10-16    142  |  103  |  16  ----------------------------<  170<H>  4.0   |  26  |  0.70    Ca    9.0      16 Oct 2018 07:17  Phos  2.8     10-16  Mg     1.9     10-16    TPro  6.8  /  Alb  3.4  /  TBili  0.4  /  DBili  x   /  AST  40  /  ALT  29  /  AlkPhos  81  10-16            Culture - Acid Fast - CSF (collected 12 Oct 2018 21:07)  Source: .CSF    Culture - Fungal, CSF (collected 12 Oct 2018 21:07)  Source: .CSF CSF  Preliminary Report (15 Oct 2018 09:29):    Testing in progress    Culture - CSF with Gram Stain (collected 12 Oct 2018 21:07)  Source: .CSF CSF  Gram Stain (12 Oct 2018 21:42):    polymorphonuclear leukocytes seen per low power field    No organisms seen per oil power field by cytocentrifuge  Final Report (15 Oct 2018 16:26):    No growth at 3 days.    Culture - Blood (collected 12 Oct 2018 16:33)  Source: .Blood Blood-Venous  Preliminary Report (13 Oct 2018 17:01):    No growth to date.    Culture - Blood (collected 12 Oct 2018 16:33)  Source: .Blood Blood  Preliminary Report (13 Oct 2018 17:01):    No growth to date.    Culture - Sputum (collected 12 Oct 2018 02:30)  Source: .Sputum Sputum  Gram Stain (12 Oct 2018 15:00):    Moderate polymorphonuclear leukocytes per low power field    Moderate squamous epithelial cells per low power field    Numerous Gram positive cocci in pairs, chains and clusters    Moderate Gram Positive Rods per oil power field    Few Gram Negative Rods per oil power field    Few Gram Negative Diplococci per oil power field  Final Report (13 Oct 2018 18:39):    Normal Respiratory Hayde present      CSF PCR Panel (10.12.18 @ 21:17)    CSF PCR Result: [facility]: The meningitis/encephalitis (ME) panel (CSFPCR) is a PCR based assay that  screens for: Escherichia coli; Haemophilus influenzae; Listeria  monocytogenes; Neisseria meningitidis; Streptococcus agalactiae;  Streptococcus pneumoniae; CMV; Enterovirus; HSV-1; HSV-2; Human  herpesvirus 6; Parechovirus; VZV and Cryptococcus. Result should be  interpreted in context of clinical presentation, imaging and other lab  tests. Positive predictive value may be lower in patients with normal CSF  chemistry and cell count.        Protein, CSF (10.12.18 @ 19:04)    Protein, CSF: 30 mg/dL    Glucose, CSF (10.12.18 @ 19:04)    Glucose, CSF: 105 mg/dL    Cerebrospinal Fluid Cell Count-1 (10.12.18 @ 19:04)    Total Nucleated Cell Count, CSF: 2 /uL    CSF Color: Pink    CSF Appearance: Clear    CSF Lymphocytes: 18 %    CSF Monocytes/Macrophages: 17 %    CSF Segmented Neutrophils: 65: Differential is based on 100 cells counted after cytocentrifugation. %      < from: MR Head w/wo IV Cont (10.12.18 @ 16:57) >    MRI BRAIN:    No acute intracranial hemorrhage, mass effect, vasogenic edema, or   evidence of acute territorial infarct.    Chronic right MCA territory infarct with ex vacuo dilatation of the right   lateral ventricle. Chronic hemorrhage in the region of the right basal   ganglia.     No abnormal parenchymal or leptomeningeal enhancement.    MRA BRAIN:    Significantly limited by patient motion and slab artifact.    Evaluation for flow-limiting stenosis or vascular aneurysm is markedly   limited.    MRA NECK:    No flow-limiting stenosisor evidence of arterial dissection. Carotid   bifurcations and origins of the common carotid and vertebral arteries   unremarkable.      < end of copied text >      < from: Xray Chest 1 View- PORTABLE-Urgent (10.12.18 @ 12:51) >    IMPRESSION:     Interval placement of right IJ central venous catheter with tip   terminating in the SVC.    Clear lungs.        < end of copied text >      < from: CT Abdomen and Pelvis w/ IV Cont (10.10.18 @ 16:25) >    IMPRESSION:   No acute traumatic injury.     Multiple pulmonary nodules measuring up to 0.4 cm, for which follow-up CT   is recommended in 3-6 months. If stable, consider CT at 2 years and 4   years.            < end of copied text >      < from: CT Chest w/ IV Cont (10.10.18 @ 15:19) >  IMPRESSION:   No acute traumatic injury.     Multiple pulmonary nodules measuring up to 0.4 cm, for which follow-up CT   is recommended in 3-6 months. If stable, consider CT at 2 years and 4   years.        < end of copied text > Patient is a 73y old  Female who presents with a chief complaint of Fall, AMS (16 Oct 2018 11:26)    From HPI" HPI:  HPI: Patient is a 73y old  Female on coumadin who presented after a fall, unwitnessed.  Patient was reportedly found by family on the ground.  She was reportedly shaking and then confused, but then acted like herself.  Per ER, she had a CT head / c-spine which were negative.  However, after the initial CT scans, she began to have seizure like activity and was intubated for airway protection.  Level 1 trauma was called at time of seizure like activity.      Hospital course:  Patient has had no further seizures since receiving Keppra on multiple EEG reads. Patient also with question of aspiration PNA, for which she was started on stress dose steroids, and Zosyn. While in ICU, infectious w/u included blood culture, LP with culture, which have remained negative. Patient with copious secretions, improved with nebulizer therapy, and was successfully extubated on 10/15/2018.    ID consulted     PAST MEDICAL & SURGICAL HISTORY:  DVT (deep venous thrombosis)  HLD (hyperlipidemia)  COPD (chronic obstructive pulmonary disease)  No significant past surgical history      Social history:   ex  Smoking, no   ETOH,   no   IVDU       FAMILY HISTORY:  No pertinent family history  - Family history of medical problems in all first degree relatives reviewed.None of these were found to be related to patients current illness.    REVIEW OF SYSTEMS  General:	Denies any malaise fatigue or chills. Fevers absent    Skin:No rash  	  Ophthalmologic:Denies any visual complaints,discharge redness or photophobia  	  ENMT:No nasal discharge,headache,sinus congestion or throat pain.No dental complaints    Respiratory and Thorax:+ cough,some sputum no  chest pain.Denies shortness of breath  	  Cardiovascular:	No chest pain,palpitaions or dizziness    Gastrointestinal:	NO nausea,abdominal pain or diarrhea.    Genitourinary:	No dysuria,frequency. No flank pain    Musculoskeletal:	No joint swelling or pain.No weakness    Neurological:No confusion,diziness.No extremity weakness.No bladder or bowel incontinence	    Psychiatric:No delusions or hallucinations	    Hematology/Lymphatics:	No LN swelling.No gum bleeding     Endocrine:	No recent weight gain or loss.No abnormal heat/cold intolerance    Allergic/Immunologic:	No hives or rash   Allergies    No Known Allergies    Intolerances        Antimicrobials:    piperacillin/tazobactam IVPB. 3.375 Gram(s) IV Intermittent every 8 hours-since 10/13    Antimicrobial history    MEDICATIONS  (STANDING):    acyclovir IVPB   104.2 mL/Hr IV Intermittent (10-12-18 @ 17:49)    acyclovir IVPB   104.2 mL/Hr IV Intermittent (10-13-18 @ 02:57)    azithromycin  IVPB   250 mL/Hr IV Intermittent (10-13-18 @ 13:00)    cefTRIAXone   IVPB   100 mL/Hr IV Intermittent (10-12-18 @ 13:17)    piperacillin/tazobactam IVPB.   25 mL/Hr IV Intermittent (10-12-18 @ 05:46)   25 mL/Hr IV Intermittent (10-11-18 @ 22:06)    piperacillin/tazobactam IVPB.   25 mL/Hr IV Intermittent (10-16-18 @ 13:24)   25 mL/Hr IV Intermittent (10-16-18 @ 06:06)   25 mL/Hr IV Intermittent (10-15-18 @ 22:46)   25 mL/Hr IV Intermittent (10-15-18 @ 13:50)   25 mL/Hr IV Intermittent (10-15-18 @ 05:03)   25 mL/Hr IV Intermittent (10-14-18 @ 21:01)   25 mL/Hr IV Intermittent (10-14-18 @ 14:50)   25 mL/Hr IV Intermittent (10-14-18 @ 06:25)   25 mL/Hr IV Intermittent (10-13-18 @ 21:33)   25 mL/Hr IV Intermittent (10-13-18 @ 15:05)    vancomycin  IVPB   250 mL/Hr IV Intermittent (10-12-18 @ 13:17)    vancomycin  IVPB   250 mL/Hr IV Intermittent (10-14-18 @ 12:46)   250 mL/Hr IV Intermittent (10-14-18 @ 03:10)   250 mL/Hr IV Intermittent (10-13-18 @ 15:05)   250 mL/Hr IV Intermittent (10-13-18 @ 01:30)      Other medications reviewed      Vital Signs Last 24 Hrs  T(C): 36.7 (16 Oct 2018 10:51), Max: 37.2 (15 Oct 2018 16:00)  T(F): 98.1 (16 Oct 2018 10:51), Max: 98.9 (15 Oct 2018 16:00)  HR: 102 (16 Oct 2018 10:51) (91 - 115)  BP: 144/75 (16 Oct 2018 10:51) (139/72 - 181/86)  BP(mean): 91 (15 Oct 2018 20:00) (91 - 123)  RR: 18 (16 Oct 2018 10:51) (18 - 33)  SpO2: 98% (16 Oct 2018 10:51) (94% - 99%)    PHYSICAL EXAM:Pleasant patient in no acute distress.      Constitutional:Comfortable.Awake and alert  No cachexia     Eyes:PERRL EOMI.NO discharge or conjunctival injection  left facial and periorbital hematoma/ecchymosis    ENMT:No sinus tenderness.No thrush.No pharyngeal exudate or erythema.Fair dental hygiene    Neck:Supple,No LN,no JVD      Respiratory:Good air entry bilaterally,bilateral crackles and wheezes    Cardiovascular:S1 S2 wnl, No murmurs,rub or gallops    Gastrointestinal:Soft BS(+) no tenderness no masses ,No rebound or guarding    Genitourinary:No CVA tendereness         Extremities:No cyanosis,clubbing or edema.  ecchymosis Left forearm     Vascular:peripheral pulses felt    Neurological:AAO X 3,No grossly focal deficits      Lymph Nodes:No palpable LNs    Musculoskeletal:No joint swelling or LOM    Psychiatric:Affect normal.          Labs:                            12.1   10.6  )-----------( 229      ( 16 Oct 2018 07:18 )             36.3     WBC Count: 10.6 (10-16-18 @ 07:18)  WBC Count: 9.5 (10-15-18 @ 00:12)  WBC Count: 6.5 (10-14-18 @ 01:08)  WBC Count: 9.8 (10-13-18 @ 13:51)  WBC Count: 11.9 (10-13-18 @ 01:39)  WBC Count: 18.4 (10-12-18 @ 00:45)  WBC Count: 15.4 (10-11-18 @ 15:42)  WBC Count: 15.0 (10-11-18 @ 01:26)  WBC Count: 18.6 (10-10-18 @ 16:27)  WBC Count: 6.6 (10-10-18 @ 13:00)      10-16    142  |  103  |  16  ----------------------------<  170<H>  4.0   |  26  |  0.70    Ca    9.0      16 Oct 2018 07:17  Phos  2.8     10-16  Mg     1.9     10-16    TPro  6.8  /  Alb  3.4  /  TBili  0.4  /  DBili  x   /  AST  40  /  ALT  29  /  AlkPhos  81  10-16            Culture - Acid Fast - CSF (collected 12 Oct 2018 21:07)  Source: .CSF    Culture - Fungal, CSF (collected 12 Oct 2018 21:07)  Source: .CSF CSF  Preliminary Report (15 Oct 2018 09:29):    Testing in progress    Culture - CSF with Gram Stain (collected 12 Oct 2018 21:07)  Source: .CSF CSF  Gram Stain (12 Oct 2018 21:42):    polymorphonuclear leukocytes seen per low power field    No organisms seen per oil power field by cytocentrifuge  Final Report (15 Oct 2018 16:26):    No growth at 3 days.    Culture - Blood (collected 12 Oct 2018 16:33)  Source: .Blood Blood-Venous  Preliminary Report (13 Oct 2018 17:01):    No growth to date.    Culture - Blood (collected 12 Oct 2018 16:33)  Source: .Blood Blood  Preliminary Report (13 Oct 2018 17:01):    No growth to date.    Culture - Sputum (collected 12 Oct 2018 02:30)  Source: .Sputum Sputum  Gram Stain (12 Oct 2018 15:00):    Moderate polymorphonuclear leukocytes per low power field    Moderate squamous epithelial cells per low power field    Numerous Gram positive cocci in pairs, chains and clusters    Moderate Gram Positive Rods per oil power field    Few Gram Negative Rods per oil power field    Few Gram Negative Diplococci per oil power field  Final Report (13 Oct 2018 18:39):    Normal Respiratory Hayde present      CSF PCR Panel (10.12.18 @ 21:17)    CSF PCR Result: [facility]: The meningitis/encephalitis (ME) panel (CSFPCR) is a PCR based assay that  screens for: Escherichia coli; Haemophilus influenzae; Listeria  monocytogenes; Neisseria meningitidis; Streptococcus agalactiae;  Streptococcus pneumoniae; CMV; Enterovirus; HSV-1; HSV-2; Human  herpesvirus 6; Parechovirus; VZV and Cryptococcus. Result should be  interpreted in context of clinical presentation, imaging and other lab  tests. Positive predictive value may be lower in patients with normal CSF  chemistry and cell count.        Protein, CSF (10.12.18 @ 19:04)    Protein, CSF: 30 mg/dL    Glucose, CSF (10.12.18 @ 19:04)    Glucose, CSF: 105 mg/dL    Cerebrospinal Fluid Cell Count-1 (10.12.18 @ 19:04)    Total Nucleated Cell Count, CSF: 2 /uL    CSF Color: Pink    CSF Appearance: Clear    CSF Lymphocytes: 18 %    CSF Monocytes/Macrophages: 17 %    CSF Segmented Neutrophils: 65: Differential is based on 100 cells counted after cytocentrifugation. %      < from: MR Head w/wo IV Cont (10.12.18 @ 16:57) >    MRI BRAIN:    No acute intracranial hemorrhage, mass effect, vasogenic edema, or   evidence of acute territorial infarct.    Chronic right MCA territory infarct with ex vacuo dilatation of the right   lateral ventricle. Chronic hemorrhage in the region of the right basal   ganglia.     No abnormal parenchymal or leptomeningeal enhancement.    MRA BRAIN:    Significantly limited by patient motion and slab artifact.    Evaluation for flow-limiting stenosis or vascular aneurysm is markedly   limited.    MRA NECK:    No flow-limiting stenosisor evidence of arterial dissection. Carotid   bifurcations and origins of the common carotid and vertebral arteries   unremarkable.      < end of copied text >      < from: Xray Chest 1 View- PORTABLE-Urgent (10.12.18 @ 12:51) >    IMPRESSION:     Interval placement of right IJ central venous catheter with tip   terminating in the SVC.    Clear lungs.        < end of copied text >      < from: CT Abdomen and Pelvis w/ IV Cont (10.10.18 @ 16:25) >    IMPRESSION:   No acute traumatic injury.     Multiple pulmonary nodules measuring up to 0.4 cm, for which follow-up CT   is recommended in 3-6 months. If stable, consider CT at 2 years and 4   years.            < end of copied text >      < from: CT Chest w/ IV Cont (10.10.18 @ 15:19) >  IMPRESSION:   No acute traumatic injury.     Multiple pulmonary nodules measuring up to 0.4 cm, for which follow-up CT   is recommended in 3-6 months. If stable, consider CT at 2 years and 4   years.        < end of copied text >

## 2018-10-16 NOTE — PHYSICAL THERAPY INITIAL EVALUATION ADULT - PRECAUTIONS/LIMITATIONS, REHAB EVAL
CT Head:  Age-appropriate involutional and ischemic gliotic changes. Old right basal ganglia ischemic changes CT Cervical Spine: (-)CT Maxillofacial: (-)MRI Brain: No acute intracranial hemorrhage, mass effect, vasogenic edema, or evidence of acute territorial infarct. Chronic right MCA territory infarct with ex vacuo dilatation of the right lateral ventricle. Chronic hemorrhage in the region of the right basal ganglia./fall precautions

## 2018-10-16 NOTE — DISCHARGE NOTE ADULT - MEDICATION SUMMARY - MEDICATIONS TO STOP TAKING
I will STOP taking the medications listed below when I get home from the hospital:  None I will STOP taking the medications listed below when I get home from the hospital:    losartan 25 mg oral tablet  -- 1 tab(s) by mouth once a day

## 2018-10-16 NOTE — OCCUPATIONAL THERAPY INITIAL EVALUATION ADULT - PERTINENT HX OF CURRENT PROBLEM, REHAB EVAL
74 yo F presented after a fall, unwitnessed. Pt was reportedly found by family on the ground.  She was reportedly shaking and then confused, but then acted like herself.  Per ER, she had a CT head / c-spine which were negative.  However, after the initial CT scans, she began to have seizure like activity and was intubated for airway protection.  Level 1 trauma was called at time of seizure like activity.   See below

## 2018-10-16 NOTE — DISCHARGE NOTE ADULT - HOSPITAL COURSE
Patient is a 73F with COPD, CVA (residual L hemiparesis), h/o dysphagia and PEG (PEG removed in past) presented on 10/10 following witnessed seizure and fall, found to be hypoxic on presentation requiring intubation. Patient has had no further seizures since receiving Keppra on multiple EEG reads. Patient also with question of aspiration PNA, for which she was started on stress dose steroids, and Zosyn. While in ICU, infectious w/u included blood culture, LP with culture, which have remained negative. Patient with copious secretions, improved with nebulizer therapy, and was successfully extubated on 10/15/2018. Patient is a 73F with COPD, CVA (residual L hemiparesis), h/o dysphagia and PEG (PEG removed in past) presented on 10/10 following witnessed seizure and fall, found to be hypoxic on presentation requiring intubation. Patient has had no further seizures since receiving Keppra on multiple EEG reads. Patient also with question of aspiration PNA, for which she was started on stress dose steroids, and Zosyn. While in ICU, infectious w/u included blood culture, LP with culture, which have remained negative. Patient with copious secretions, improved with nebulizer therapy, and was successfully extubated on 10/15/2018.  s/p MBS - Dysphagia 2 - Nectar thickened   Soria removed but patient noted to retain, so soria reinserted. Patient is a 73F with COPD, CVA (residual L hemiparesis), h/o dysphagia and PEG (PEG removed in past) presented on 10/10 following witnessed seizure and fall, found to be hypoxic on presentation requiring intubation. Patient has had no further seizures since receiving Keppra on multiple EEG reads. Patient also with question of aspiration PNA, for which she was started on stress dose steroids, and Zosyn. While in ICU, infectious w/u included blood culture, LP with culture, which have remained negative. Patient with copious secretions, improved with nebulizer therapy, and was successfully extubated on 10/15/2018.  s/p MBS - Dysphagia 2 - Nectar thickened liquids  Soria removed but patient noted to retain, so soria reinserted.  Stable for discharge to rehab with PMD, neuro followup. Patient is a 73F with COPD, CVA (residual L hemiparesis), h/o dysphagia and PEG (PEG removed in past) presented on 10/10 following witnessed seizure and fall, found to be hypoxic on presentation requiring intubation. Patient has had no further seizures since receiving Keppra on multiple EEG reads. Patient also with question of aspiration PNA, for which she was started on stress dose steroids, and Zosyn. While in ICU, infectious w/u included blood culture, LP with culture, which have remained negative. Patient with copious secretions, improved with nebulizer therapy, and was successfully extubated on 10/15/2018.  s/p MBS - Dysphagia 2 - Nectar thickened liquids; had peg tube placed on 10/29/18 for additional feeds to meet nutritional needs. follow up by nutrition/speech and swallow eval.  Soria removed but patient noted to retain, so soria reinserted.  Stable for discharge to rehab and follow up by md and neuro followup.

## 2018-10-16 NOTE — PROGRESS NOTE ADULT - ASSESSMENT
Patient is a 72 yo F with PMHx of COPD, Bronchitis, Afib (on home coumadin), Right basal ganglion infarct (with resulting dysphagia s/p PEG and residual left spastic hemiparesis) who presented to the ED after unwitnessed fall and possible seizure activity s/p extubation 10/15.

## 2018-10-16 NOTE — DISCHARGE NOTE ADULT - CARE PLAN
Principal Discharge DX:	Seizure Principal Discharge DX:	Seizure  Goal:	No seizure activity on antiseizure medications  Secondary Diagnosis:	Chronic atrial fibrillation  Assessment and plan of treatment:	Atrial fibrillation is the most common heart rhythm problem.  The condition puts you at risk for has stroke and heart attack  It helps if you control your blood pressure, not drink more than 1-2 alcohol drinks per day, cut down on caffeine, getting treatment for over active thyroid gland, and get regular exercise  Call your doctor if you feel your heart racing or beating unusually, chest tightness or pain, lightheaded, faint, shortness of breath especially with exercise  It is important to take your heart medication as prescribed  You may be on anticoagulation which is very important to take as directed - you may need blood work to monitor drug levels  Secondary Diagnosis:	Chronic obstructive pulmonary disease with acute exacerbation  Assessment and plan of treatment:	Continue with current medication regimen.  Secondary Diagnosis:	HTN (hypertension)  Assessment and plan of treatment:	Low salt diet  Activity as tolerated.  Take all medication as prescribed.  Follow up with your medical doctor for routine blood pressure monitoring at your next visit.  Notify your doctor if you have any of the following symptoms:   Dizziness, Lightheadedness, Blurry vision, Headache, Chest pain, Shortness of breath Principal Discharge DX:	Seizure  Goal:	No seizure activity on antiseizure medications  Assessment and plan of treatment:	You will need to follow up with your neurologist, Dr. Sanchez, [phone], within 1-2 weeks of discharge - please call to make an appointment.  Secondary Diagnosis:	Chronic atrial fibrillation  Assessment and plan of treatment:	Atrial fibrillation is the most common heart rhythm problem.  The condition puts you at risk for has stroke and heart attack  It helps if you control your blood pressure, not drink more than 1-2 alcohol drinks per day, cut down on caffeine, getting treatment for over active thyroid gland, and get regular exercise  Call your doctor if you feel your heart racing or beating unusually, chest tightness or pain, lightheaded, faint, shortness of breath especially with exercise  It is important to take your heart medication as prescribed  You may be on anticoagulation which is very important to take as directed - you may need blood work to monitor drug levels  Secondary Diagnosis:	Chronic obstructive pulmonary disease with acute exacerbation  Assessment and plan of treatment:	Continue with current medication regimen.  Secondary Diagnosis:	HTN (hypertension)  Assessment and plan of treatment:	You will need to follow up with your primary medical doctor within one week of discharge - please call to make an appointment.  Low salt diet  Activity as tolerated.  Take all medication as prescribed.  Follow up with your medical doctor for routine blood pressure monitoring at your next visit.  Notify your doctor if you have any of the following symptoms:   Dizziness, Lightheadedness, Blurry vision, Headache, Chest pain, Shortness of breath Principal Discharge DX:	Seizure  Goal:	No seizure activity on antiseizure medications  Assessment and plan of treatment:	You will need to follow up with your neurologist, Dr. Sanchez, [phone], within 1-2 weeks of discharge - please call to make an appointment.  Secondary Diagnosis:	Chronic atrial fibrillation  Assessment and plan of treatment:	Atrial fibrillation is the most common heart rhythm problem.  The condition puts you at risk for has stroke and heart attack  It helps if you control your blood pressure, not drink more than 1-2 alcohol drinks per day, cut down on caffeine, getting treatment for over active thyroid gland, and get regular exercise  Call your doctor if you feel your heart racing or beating unusually, chest tightness or pain, lightheaded, faint, shortness of breath especially with exercise  It is important to take your heart medication as prescribed  You may be on anticoagulation which is very important to take as directed - you may need blood work to monitor drug levels  Secondary Diagnosis:	Chronic obstructive pulmonary disease with acute exacerbation  Assessment and plan of treatment:	Continue with current medication regimen.  Secondary Diagnosis:	HTN (hypertension)  Assessment and plan of treatment:	You will need to follow up with your primary medical doctor within one week of discharge - please call to make an appointment.  Low salt diet  Activity as tolerated.  Take all medication as prescribed.  Follow up with your medical doctor for routine blood pressure monitoring at your next visit.  Notify your doctor if you have any of the following symptoms:   Dizziness, Lightheadedness, Blurry vision, Headache, Chest pain, Shortness of breath  Secondary Diagnosis:	Urinary retention  Assessment and plan of treatment:	Soria catheter  Consider discontinuing soria in rehab and performing a TOV.  Secondary Diagnosis:	Hypokalemia  Assessment and plan of treatment:	You will need to have a BMP checked in two days to make sure that your potassium is within normal limits. Principal Discharge DX:	Seizure  Goal:	No seizure activity on antiseizure medications  Assessment and plan of treatment:	You will need to followup with neurology Dr. Sanchez at [address] [place] [address] [phone] for further management within 1-2 weeks of discharge - please call to make an appointment.  Secondary Diagnosis:	Chronic atrial fibrillation  Assessment and plan of treatment:	Atrial fibrillation is the most common heart rhythm problem.  The condition puts you at risk for has stroke and heart attack  It helps if you control your blood pressure, not drink more than 1-2 alcohol drinks per day, cut down on caffeine, getting treatment for over active thyroid gland, and get regular exercise  Call your doctor if you feel your heart racing or beating unusually, chest tightness or pain, lightheaded, faint, shortness of breath especially with exercise  It is important to take your heart medication as prescribed  You may be on anticoagulation which is very important to take as directed - you may need blood work to monitor drug levels  Secondary Diagnosis:	Chronic obstructive pulmonary disease with acute exacerbation  Assessment and plan of treatment:	Continue with current medication regimen.  Secondary Diagnosis:	HTN (hypertension)  Assessment and plan of treatment:	You will need to follow up with your primary medical doctor within one week of discharge - please call to make an appointment.  Low salt diet  Activity as tolerated.  Take all medication as prescribed.  Follow up with your medical doctor for routine blood pressure monitoring at your next visit.  Notify your doctor if you have any of the following symptoms:   Dizziness, Lightheadedness, Blurry vision, Headache, Chest pain, Shortness of breath  Secondary Diagnosis:	Urinary retention  Assessment and plan of treatment:	Soria catheter  Consider discontinuing soria in rehab and performing a TOV.  Secondary Diagnosis:	Hypokalemia  Assessment and plan of treatment:	You will need to have a BMP checked in two days to make sure that your potassium is within normal limits. Principal Discharge DX:	Seizure  Goal:	No seizure activity on antiseizure medications  Assessment and plan of treatment:	You will need to followup with neurology Dr. Sanchez at [address] [place] [address] [phone] for further management within 1-2 weeks of discharge - please call to make an appointment.  Secondary Diagnosis:	Chronic atrial fibrillation  Assessment and plan of treatment:	Atrial fibrillation is the most common heart rhythm problem.  The condition puts you at risk for has stroke and heart attack  It helps if you control your blood pressure, not drink more than 1-2 alcohol drinks per day, cut down on caffeine, getting treatment for over active thyroid gland, and get regular exercise  Call your doctor if you feel your heart racing or beating unusually, chest tightness or pain, lightheaded, faint, shortness of breath especially with exercise  It is important to take your heart medication as prescribed  You may be on anticoagulation which is very important to take as directed - you may need blood work to monitor drug levels  Secondary Diagnosis:	Chronic obstructive pulmonary disease with acute exacerbation  Assessment and plan of treatment:	Continue with current medication regimen.  Secondary Diagnosis:	HTN (hypertension)  Assessment and plan of treatment:	You will need to follow up with your primary medical doctor within one week of discharge - please call to make an appointment.  Low salt diet  Activity as tolerated.  Take all medication as prescribed.  Follow up with your medical doctor for routine blood pressure monitoring at your next visit.  Notify your doctor if you have any of the following symptoms:   Dizziness, Lightheadedness, Blurry vision, Headache, Chest pain, Shortness of breath  Secondary Diagnosis:	Urinary retention  Assessment and plan of treatment:	Soria catheter  Consider discontinuing soria in rehab and performing a TOV when more ambulatory  Secondary Diagnosis:	Hypokalemia  Assessment and plan of treatment:	You will need to have a BMP checked in two days to make sure that your potassium is within normal limits.  Secondary Diagnosis:	Dysphagia, unspecified type  Assessment and plan of treatment:	continue dysphagia 2 with nectar thick liquids with additional peg tube feeds for 10 hours from 7pm to 5 am daily;  follow up by nutrition to adjust feeds  follow up by speech/swallow team Principal Discharge DX:	Seizure  Goal:	No seizure activity on antiseizure medications  Assessment and plan of treatment:	continue seizure medication  You will need to followup with neurology Dr. Sanchez at [address] [place] [address] [phone] for further management within 1-2 weeks of discharge - please call to make an appointment.  Secondary Diagnosis:	Chronic atrial fibrillation  Assessment and plan of treatment:	Atrial fibrillation is the most common heart rhythm problem.  The condition puts you at risk for has stroke and heart attack  It helps if you control your blood pressure, not drink more than 1-2 alcohol drinks per day, cut down on caffeine, getting treatment for over active thyroid gland, and get regular exercise  Call your doctor if you feel your heart racing or beating unusually, chest tightness or pain, lightheaded, faint, shortness of breath especially with exercise  It is important to take your heart medication as prescribed  continue your heart medication  continue coumadin and monitor blood work to dose coumadin  Secondary Diagnosis:	Chronic obstructive pulmonary disease with acute exacerbation  Assessment and plan of treatment:	Continue with current medication regimen.  Secondary Diagnosis:	HTN (hypertension)  Assessment and plan of treatment:	You will need to follow up with your primary medical doctor within one week of discharge - please call to make an appointment.  Low salt diet  Activity as tolerated.  Take all medication as prescribed.  Follow up with your medical doctor for routine blood pressure monitoring at your next visit.  Notify your doctor if you have any of the following symptoms:   Dizziness, Lightheadedness, Blurry vision, Headache, Chest pain, Shortness of breath  Secondary Diagnosis:	Urinary retention  Assessment and plan of treatment:	Soria catheter  Consider discontinuing soria in rehab and performing a TOV when more ambulatory  Secondary Diagnosis:	Hypokalemia  Assessment and plan of treatment:	You will need to have a BMP checked in two days to make sure that your potassium is within normal limits.  Secondary Diagnosis:	Dysphagia, unspecified type  Assessment and plan of treatment:	continue dysphagia 2 with nectar thick liquids with additional peg tube feeds for 10 hours from 7pm to 5 am daily;  follow up by nutrition to adjust feeds  follow up by speech/swallow team

## 2018-10-16 NOTE — SWALLOW BEDSIDE ASSESSMENT ADULT - SWALLOW EVAL: DIAGNOSIS
Attempted to see pt for swallow evaluation. Chart reviewed. Pt currently being seen by another service. Will attempt to f/u, as time permits.

## 2018-10-16 NOTE — DISCHARGE NOTE ADULT - CARE PROVIDER_API CALL
Shayla Sanchez), Neurology  [address] [facility]  [address]  [place] [address]  Phone: [phone]  Fax: [phone]    Marshall Catalan), PhysicalRehab Medicine  [address] [facility]  [address] [place] [address]  Phone: [phone]  Fax: [phone] Shayla Sanchez), Neurology  [address] [facility]  [address]  [place] [address]  Phone: [phone]  Fax: [phone]    Marshall Catalan), PhysicalRehab Medicine  [address] [facility]  [address]th [place] [address]  Phone: [phone]  Fax: [phone]    Dayton Styles), Internal Medicine  [address] [place] [address]  Phone: [phone]  Fax: [phone] Shayla Sanchez), Neurology  [address] [facility]  [address]  [place] [address]  Phone: [phone]  Fax: [phone]    Marshall Catalan), PhysicalRehab Medicine  [address] [facility]  4th [place] [address]  Phone: [phone]  Fax: [phone]    Dayton Styles), Internal Medicine  [address] [place] [address]  Phone: [phone]  Fax: [phone]    Andre Casey (DO), Gastroenterology; Internal Medicine  [address] [place] [address]  Phone: [phone]  Fax: [phone]

## 2018-10-16 NOTE — CONSULT NOTE ADULT - ASSESSMENT
72 yo woman with h/o COPD,DVT on AC  s/p fall/passing out  ? seizure  s/p respiratory failure -mechanical ventilation  Low grade fever but resolved  Now extubated  Still with some cough and sputum  Hypoxia requiring oxygen  CXR clear   Cultures as above  ? Aspiration  ? COPD exacerbation  No s/s any other foci-workup for CNS infection and LP negative  Recommend:

## 2018-10-16 NOTE — PROGRESS NOTE ADULT - ATTENDING COMMENTS
disposition per PT eval disposition per PT evaluation  discussed with daughter at bedside in detail  discussed with patient in detail, all questions answered  d/w  over the phone in detail

## 2018-10-16 NOTE — DISCHARGE NOTE ADULT - CARE PROVIDERS DIRECT ADDRESSES
,[email or web address] ,[email or web address],[email or web address] ,[email or web address],DirectAddress_Unknown,DirectAddress_Unknown

## 2018-10-16 NOTE — PROGRESS NOTE ADULT - SUBJECTIVE AND OBJECTIVE BOX
Patient is a 73y old  Female who presents with a chief complaint of Fall, AMS  patient not known to me, assigned this AM to assume care  chart reviewed and events thus far noted   admitted overnight by full time hospitalist service       SUBJECTIVE / OVERNIGHT EVENTS: no overnight events    ROS:    Resp: No cough no sputum production  CVS: No chest pain no palpitations no orthopnea  GI: no N/V/D  : no dysuria, no hematuria  Neuro: no weakness no paresthesias  Heme: No petechiae no easy bruising  Msk: No joint pain no swelling  Skin: No rash no itching        MEDICATIONS  (STANDING):  ALBUTerol/ipratropium for Nebulization 3 milliLiter(s) Nebulizer every 6 hours  dextrose 5%. 1000 milliLiter(s) (50 mL/Hr) IV Continuous <Continuous>  dextrose 5%. 1000 milliLiter(s) (50 mL/Hr) IV Continuous <Continuous>  dextrose 50% Injectable 12.5 Gram(s) IV Push once  dextrose 50% Injectable 25 Gram(s) IV Push once  dextrose 50% Injectable 25 Gram(s) IV Push once  heparin  Infusion. 700 Unit(s)/Hr (7 mL/Hr) IV Continuous <Continuous>  insulin lispro (HumaLOG) corrective regimen sliding scale   SubCutaneous every 6 hours  levETIRAcetam  IVPB 500 milliGRAM(s) IV Intermittent every 12 hours  methylPREDNISolone sodium succinate Injectable 20 milliGRAM(s) IV Push every 12 hours  piperacillin/tazobactam IVPB. 3.375 Gram(s) IV Intermittent every 8 hours  warfarin 2 milliGRAM(s) Oral once    MEDICATIONS  (PRN):  acetaminophen   Tablet .. 650 milliGRAM(s) Oral every 6 hours PRN Temp greater or equal to 38C (100.4F)  dextrose 40% Gel 15 Gram(s) Oral once PRN Blood Glucose LESS THAN 70 milliGRAM(s)/deciLiter  glucagon  Injectable 1 milliGRAM(s) IntraMuscular once PRN Glucose <70 milliGRAM(s)/deciLiter  haloperidol    Injectable 1 milliGRAM(s) IV Push every 6 hours PRN Agitation  heparin  Injectable 3500 Unit(s) IV Push every 6 hours PRN For aPTT less than 40  heparin  Injectable 1500 Unit(s) IV Push every 6 hours PRN For aPTT between 40 - 57        CAPILLARY BLOOD GLUCOSE      POCT Blood Glucose.: 156 mg/dL (16 Oct 2018 12:34)  POCT Blood Glucose.: 164 mg/dL (16 Oct 2018 06:18)  POCT Blood Glucose.: 165 mg/dL (15 Oct 2018 23:55)  POCT Blood Glucose.: 147 mg/dL (15 Oct 2018 17:59)    I&O's Summary    15 Oct 2018 07:01  -  16 Oct 2018 07:00  --------------------------------------------------------  IN: 1679 mL / OUT: 250 mL / NET: 1429 mL    16 Oct 2018 07:01  -  16 Oct 2018 15:09  --------------------------------------------------------  IN: 564 mL / OUT: 0 mL / NET: 564 mL        Vital Signs Last 24 Hrs  T(C): 36.7 (16 Oct 2018 10:51), Max: 37.2 (15 Oct 2018 16:00)  T(F): 98.1 (16 Oct 2018 10:51), Max: 98.9 (15 Oct 2018 16:00)  HR: 102 (16 Oct 2018 10:51) (91 - 115)  BP: 144/75 (16 Oct 2018 10:51) (139/72 - 181/86)  BP(mean): 91 (15 Oct 2018 20:00) (91 - 123)  RR: 18 (16 Oct 2018 10:51) (18 - 33)  SpO2: 98% (16 Oct 2018 10:51) (94% - 99%)    PHYSICAL EXAM:  GENERAL: NAD, as  HEAD:  Atraumatic, Normocephalic  EYES: EOMI, PERRLA, conjunctiva and sclera clear  NECK: Supple, No JVD  CHEST/LUNG: no rhonchi, no wheeze, clear to auscultation bilaterally  HEART: S1 S2; No rubs or gallops, no murmurs appreciated  ABDOMEN: Soft, Nontender, Nondistended; Bowel sounds present  EXTREMITIES:  No clubbing or cyanosis, + Peripheral Pulses,  no edema  PSYCH: AO x 3 appropriate affect  NEUROLOGY: non-focal, motor and sensory systems intact  SKIN: No rashes or lesions    LABS:                        12.1   10.6  )-----------( 229      ( 16 Oct 2018 07:18 )             36.3     10-16    142  |  103  |  16  ----------------------------<  170<H>  4.0   |  26  |  0.70    Ca    9.0      16 Oct 2018 07:17  Phos  2.8     10-16  Mg     1.9     10-16    TPro  6.8  /  Alb  3.4  /  TBili  0.4  /  DBili  x   /  AST  40  /  ALT  29  /  AlkPhos  81  10-16    PT/INR - ( 16 Oct 2018 07:17 )   PT: 11.3 sec;   INR: 1.05 ratio         PTT - ( 16 Oct 2018 07:17 )  PTT:46.5 sec            All consultant(s) notes reviewed and care discussed with other providers    Contact Number, Dr Styles [phone] Patient is a 73y old  Female who presents with a chief complaint of Fall, AMS  patient not known to me, assigned this AM to assume care  chart reviewed and events thus far noted   transferred from MICU overnight       SUBJECTIVE / OVERNIGHT EVENTS: no overnight events    ROS:    Resp: occasional cough no sputum production  CVS: No chest pain no palpitations no orthopnea  GI: no N/V/D  : no dysuria, no hematuria  Neuro: no weakness no paresthesias  Heme: No petechiae no easy bruising  Msk: No joint pain no swelling  Skin: No rash no itching        MEDICATIONS  (STANDING):  ALBUTerol/ipratropium for Nebulization 3 milliLiter(s) Nebulizer every 6 hours  dextrose 5%. 1000 milliLiter(s) (50 mL/Hr) IV Continuous <Continuous>  dextrose 5%. 1000 milliLiter(s) (50 mL/Hr) IV Continuous <Continuous>  dextrose 50% Injectable 12.5 Gram(s) IV Push once  dextrose 50% Injectable 25 Gram(s) IV Push once  dextrose 50% Injectable 25 Gram(s) IV Push once  heparin  Infusion. 700 Unit(s)/Hr (7 mL/Hr) IV Continuous <Continuous>  insulin lispro (HumaLOG) corrective regimen sliding scale   SubCutaneous every 6 hours  levETIRAcetam  IVPB 500 milliGRAM(s) IV Intermittent every 12 hours  methylPREDNISolone sodium succinate Injectable 20 milliGRAM(s) IV Push every 12 hours  piperacillin/tazobactam IVPB. 3.375 Gram(s) IV Intermittent every 8 hours  warfarin 2 milliGRAM(s) Oral once    MEDICATIONS  (PRN):  acetaminophen   Tablet .. 650 milliGRAM(s) Oral every 6 hours PRN Temp greater or equal to 38C (100.4F)  dextrose 40% Gel 15 Gram(s) Oral once PRN Blood Glucose LESS THAN 70 milliGRAM(s)/deciLiter  glucagon  Injectable 1 milliGRAM(s) IntraMuscular once PRN Glucose <70 milliGRAM(s)/deciLiter  haloperidol    Injectable 1 milliGRAM(s) IV Push every 6 hours PRN Agitation  heparin  Injectable 3500 Unit(s) IV Push every 6 hours PRN For aPTT less than 40  heparin  Injectable 1500 Unit(s) IV Push every 6 hours PRN For aPTT between 40 - 57        CAPILLARY BLOOD GLUCOSE      POCT Blood Glucose.: 156 mg/dL (16 Oct 2018 12:34)  POCT Blood Glucose.: 164 mg/dL (16 Oct 2018 06:18)  POCT Blood Glucose.: 165 mg/dL (15 Oct 2018 23:55)  POCT Blood Glucose.: 147 mg/dL (15 Oct 2018 17:59)    I&O's Summary    15 Oct 2018 07:01  -  16 Oct 2018 07:00  --------------------------------------------------------  IN: 1679 mL / OUT: 250 mL / NET: 1429 mL    16 Oct 2018 07:01  -  16 Oct 2018 15:09  --------------------------------------------------------  IN: 564 mL / OUT: 0 mL / NET: 564 mL        Vital Signs Last 24 Hrs  T(C): 36.7 (16 Oct 2018 10:51), Max: 37.2 (15 Oct 2018 16:00)  T(F): 98.1 (16 Oct 2018 10:51), Max: 98.9 (15 Oct 2018 16:00)  HR: 102 (16 Oct 2018 10:51) (91 - 115)  BP: 144/75 (16 Oct 2018 10:51) (139/72 - 181/86)  BP(mean): 91 (15 Oct 2018 20:00) (91 - 123)  RR: 18 (16 Oct 2018 10:51) (18 - 33)  SpO2: 98% (16 Oct 2018 10:51) (94% - 99%)    PHYSICAL EXAM:  GENERAL: NAD, asthenic  HEAD:  Atraumatic, Normocephalic  EYES: EOMI, PERRLA, conjunctiva and sclera clear  NECK: Supple, No JVD  CHEST/LUNG: no rhonchi, no wheeze, clear to auscultation bilaterally  decreased breath sounds at bases   HEART: S1 S2; No rubs or gallops, soft ejection systolic murmur best heard at left sternal border   ABDOMEN: Soft, Nontender, Nondistended; Bowel sounds present  EXTREMITIES:  No clubbing or cyanosis, + Peripheral Pulses,  no edema  PSYCH: Alert, pleasant, communicative  NEUROLOGY: left UE contracted since CVA  left leg 2 - 3/5 power  SKIN: No rashes or lesions    LABS:                        12.1   10.6  )-----------( 229      ( 16 Oct 2018 07:18 )             36.3     10-16    142  |  103  |  16  ----------------------------<  170<H>  4.0   |  26  |  0.70    Ca    9.0      16 Oct 2018 07:17  Phos  2.8     10-16  Mg     1.9     10-16    TPro  6.8  /  Alb  3.4  /  TBili  0.4  /  DBili  x   /  AST  40  /  ALT  29  /  AlkPhos  81  10-16    PT/INR - ( 16 Oct 2018 07:17 )   PT: 11.3 sec;   INR: 1.05 ratio         PTT - ( 16 Oct 2018 07:17 )  PTT:46.5 sec            All consultant(s) notes reviewed and care discussed with other providers    Contact Number, Dr Styles [phone]

## 2018-10-16 NOTE — OCCUPATIONAL THERAPY INITIAL EVALUATION ADULT - ORIENTATION, REHAB EVAL
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

## 2018-10-16 NOTE — DISCHARGE NOTE ADULT - ADDITIONAL INSTRUCTIONS
You will need to follow up with your primary medical doctor within one week of discharge - please call to make an appointment.    You will need to follow up with your neurologist, Dr. Sanchez, [phone], within 1-2 weeks of discharge - please call to make an appointment. You will need your INR checked tomorrow and your Coumadin adjusted accordingly.    You will need to have a BMP checked in two days to make sure that your potassium is within normal limits.    You will need to follow up with your primary medical doctor within one week of discharge - please call to make an appointment.    You will need to follow up with your neurologist, Dr. Sanchez, [phone], within 1-2 weeks of discharge - please call to make an appointment.

## 2018-10-16 NOTE — OCCUPATIONAL THERAPY INITIAL EVALUATION ADULT - LIVES WITH, PROFILE
Pt lives with  in private home with 3 steps to enter, 1st floor setup, tub in bathroom with shower chair and grab bars. Pt with old CVA, residual L sided weakness, has HHA 7 hours a day 7 days a week to assist with ADLs, as per  pt can dress herself, requires assistance with bathing.  Pt ambulates with cane and AFO LLE

## 2018-10-16 NOTE — OCCUPATIONAL THERAPY INITIAL EVALUATION ADULT - BALANCE TRAINING, PT EVAL
Pt will demonstrate improved static/dynamic balance to fair in order to increase safety and independence in ADLs within 2 weeks

## 2018-10-16 NOTE — OCCUPATIONAL THERAPY INITIAL EVALUATION ADULT - ADDITIONAL COMMENTS
CT Head:  Age-appropriate involutional and ischemic gliotic changes. Old right basal ganglia ischemic changes   CT Cervical Spine: (-)  CT Maxillofacial: (-)  MRI Brain: No acute intracranial hemorrhage, mass effect, vasogenic edema, or evidence of acute territorial infarct. Chronic right MCA territory infarct with ex vacuo dilatation of the right lateral ventricle. Chronic hemorrhage in the region of the right basal ganglia.

## 2018-10-16 NOTE — DISCHARGE NOTE ADULT - INSTRUCTIONS
Dysphagia 2 - Nectar thickened liquids Dysphagia 2 - Nectar thickened liquids  peg tube feeds with jevity 1.2 rita at 70ml per hour -night feeds from 7pm to 5 am daily( feeds for 10 hours)

## 2018-10-16 NOTE — DISCHARGE NOTE ADULT - SECONDARY DIAGNOSIS.
Chronic atrial fibrillation Chronic obstructive pulmonary disease with acute exacerbation HTN (hypertension) Urinary retention Hypokalemia Dysphagia, unspecified type

## 2018-10-16 NOTE — PHYSICAL THERAPY INITIAL EVALUATION ADULT - ADDITIONAL COMMENTS
Pt lives in a private home with  with first floor set-up. Pt has HHA 7h/day 7days/week and daughter assists at all other times. Prior to admission pt was independent with functional mobility with use of straight cane and AFO. Pt has wheelchairs, RW and all other necessary DME. Family would like to take pt home at time of d/c.

## 2018-10-16 NOTE — PROGRESS NOTE ADULT - PROBLEM SELECTOR PLAN 3
Neuro recommendations appreciated  will continue IV Keppra 500 BID   transition to po once cleared by speech and swallow service

## 2018-10-16 NOTE — CONSULT NOTE ADULT - ATTENDING COMMENTS
This is a 72yo woman w/h/o DM, COPD, R sided CVA w/ residual L sided weakness, on warfarin for unclear reason who presented with new onset convulsions x2. Overnight cvEEG found R temporal spike-wave discharges. Pt already on LEV 500mg BID.    Impression:   New onset focal to BL tonic clonic seizures, likely of R temporal onset. Probably d/t old infarct.    Plan:  - DC cvEEG   - cont LEV 500mg BID  - MRI and MRA pending
Patient seen and examined and agree with above.  Patient appears to have had a seizure and fell. She had declining mental status and was intubated in the trauma bay. As per her family she did not have previous epilepsy. She underwent CT head, c spine, chest abdomen pelvis which were negative for traumatic injury.   PMH, PSH, meds labs and imaging reviewed.  On physical exam she did not follow any commands as she was recently given rocuronium.   MICU consult called for transfer to their service as patient does not have any traumatic injury.
case d/w med NP  Will tailor plan for ID issues  per course,results.Will defer to primary team on management of other issues.  Will Follow.  Beeper [phone]-tvql/afterhours/No response-[phone]

## 2018-10-16 NOTE — SWALLOW BEDSIDE ASSESSMENT ADULT - SWALLOW EVAL: DIAGNOSIS
Pt with a known hx of dysphagia. Had a PEG in 2014 after CVA. Was eventually transitioned to a regular diet, with family reports suggestive of possible laryngeal penetration/aspiration on thin liquids (when consumed in uncontrolled volumes). Pt now adm with sz activity, was in MICU intubated and is being treated for aspiration PNA. Failed MD swallow assessment earlier today. Pt seen for swallow evaluation this evening, now with acute change in MS (as compared to earlier today) . Given altered MS superimposed upon known hx of dysphagia and current aspiration PNA, pt presents as current risk for aspiration and related complications

## 2018-10-16 NOTE — OCCUPATIONAL THERAPY INITIAL EVALUATION ADULT - ANTICIPATED DISCHARGE DISPOSITION, OT EVAL
home w/ OT/for ADLs and safety assessment in home environment. Pt has HHA 7 hours a day/7 days a week,  to assist during additional hours

## 2018-10-16 NOTE — DISCHARGE NOTE ADULT - PROVIDER TOKENS
TOKEN:'[id number]:'[id number] TOKEN:'[id number]:'[id number]:'[id number]' TOKEN:'[id number]:'[id number]'

## 2018-10-16 NOTE — DISCHARGE NOTE ADULT - PLAN OF CARE
No seizure activity on antiseizure medications Atrial fibrillation is the most common heart rhythm problem.  The condition puts you at risk for has stroke and heart attack  It helps if you control your blood pressure, not drink more than 1-2 alcohol drinks per day, cut down on caffeine, getting treatment for over active thyroid gland, and get regular exercise  Call your doctor if you feel your heart racing or beating unusually, chest tightness or pain, lightheaded, faint, shortness of breath especially with exercise  It is important to take your heart medication as prescribed  You may be on anticoagulation which is very important to take as directed - you may need blood work to monitor drug levels Continue with current medication regimen. Low salt diet  Activity as tolerated.  Take all medication as prescribed.  Follow up with your medical doctor for routine blood pressure monitoring at your next visit.  Notify your doctor if you have any of the following symptoms:   Dizziness, Lightheadedness, Blurry vision, Headache, Chest pain, Shortness of breath You will need to follow up with your neurologist, Dr. Sanchez, [phone], within 1-2 weeks of discharge - please call to make an appointment. You will need to follow up with your primary medical doctor within one week of discharge - please call to make an appointment.  Low salt diet  Activity as tolerated.  Take all medication as prescribed.  Follow up with your medical doctor for routine blood pressure monitoring at your next visit.  Notify your doctor if you have any of the following symptoms:   Dizziness, Lightheadedness, Blurry vision, Headache, Chest pain, Shortness of breath Soria catheter  Consider discontinuing soria in rehab and performing a TOV. You will need to have a BMP checked in two days to make sure that your potassium is within normal limits. You will need to followup with neurology Dr. Sanchez at [address] [place] [address] [phone] for further management within 1-2 weeks of discharge - please call to make an appointment. Sroia catheter  Consider discontinuing soria in rehab and performing a TOV when more ambulatory continue dysphagia 2 with nectar thick liquids with additional peg tube feeds for 10 hours from 7pm to 5 am daily;  follow up by nutrition to adjust feeds  follow up by speech/swallow team continue seizure medication  You will need to followup with neurology Dr. Sanchez at [address] [place] [address] [phone] for further management within 1-2 weeks of discharge - please call to make an appointment. Atrial fibrillation is the most common heart rhythm problem.  The condition puts you at risk for has stroke and heart attack  It helps if you control your blood pressure, not drink more than 1-2 alcohol drinks per day, cut down on caffeine, getting treatment for over active thyroid gland, and get regular exercise  Call your doctor if you feel your heart racing or beating unusually, chest tightness or pain, lightheaded, faint, shortness of breath especially with exercise  It is important to take your heart medication as prescribed  continue your heart medication  continue coumadin and monitor blood work to dose coumadin

## 2018-10-16 NOTE — DISCHARGE NOTE ADULT - MEDICATION SUMMARY - MEDICATIONS TO CHANGE
I will SWITCH the dose or number of times a day I take the medications listed below when I get home from the hospital:  None I will SWITCH the dose or number of times a day I take the medications listed below when I get home from the hospital:    warfarin 1 mg oral tablet

## 2018-10-16 NOTE — DISCHARGE NOTE ADULT - PATIENT PORTAL LINK FT
You can access the [facility] Patient Portal, offered by [facility], by registering with the following website: [email or web address]

## 2018-10-16 NOTE — CHART NOTE - NSCHARTNOTEFT_GEN_A_CORE
Pt seen earlier to follow up on episode of SVT to 140s prior to shift.   Pt seen at bedside at change of shift. Calm and cooperative in bed.   Denies any SOB, chest pain or palpitations.   Repeat vitals were now normal.   ABG 7.48/36/107/27 with 98% o2 sat.   CXR prelim read by radiology - clear.     Will continue to monitor overnight.   F/U with primary team in am.     Deepa Sawyer ANP-BC  [id number]

## 2018-10-17 DIAGNOSIS — R13.10 DYSPHAGIA, UNSPECIFIED: ICD-10-CM

## 2018-10-17 DIAGNOSIS — J44.9 CHRONIC OBSTRUCTIVE PULMONARY DISEASE, UNSPECIFIED: ICD-10-CM

## 2018-10-17 DIAGNOSIS — J96.00 ACUTE RESPIRATORY FAILURE, UNSPECIFIED WHETHER WITH HYPOXIA OR HYPERCAPNIA: ICD-10-CM

## 2018-10-17 DIAGNOSIS — I48.2 CHRONIC ATRIAL FIBRILLATION: ICD-10-CM

## 2018-10-17 DIAGNOSIS — I63.9 CEREBRAL INFARCTION, UNSPECIFIED: ICD-10-CM

## 2018-10-17 DIAGNOSIS — J98.8 OTHER SPECIFIED RESPIRATORY DISORDERS: ICD-10-CM

## 2018-10-17 LAB
ANION GAP SERPL CALC-SCNC: 16 MMOL/L — SIGNIFICANT CHANGE UP (ref 5–17)
APTT BLD: 62.7 SEC — HIGH (ref 27.5–37.4)
B BURGDOR DNA SPEC QL NAA+PROBE: NEGATIVE — SIGNIFICANT CHANGE UP
BASOPHILS # BLD AUTO: 0.02 K/UL — SIGNIFICANT CHANGE UP (ref 0–0.2)
BASOPHILS NFR BLD AUTO: 0.1 % — SIGNIFICANT CHANGE UP (ref 0–2)
BUN SERPL-MCNC: 17 MG/DL — SIGNIFICANT CHANGE UP (ref 7–23)
CALCIUM SERPL-MCNC: 9 MG/DL — SIGNIFICANT CHANGE UP (ref 8.4–10.5)
CHLORIDE SERPL-SCNC: 93 MMOL/L — LOW (ref 96–108)
CO2 SERPL-SCNC: 22 MMOL/L — SIGNIFICANT CHANGE UP (ref 22–31)
CREAT SERPL-MCNC: 0.93 MG/DL — SIGNIFICANT CHANGE UP (ref 0.5–1.3)
CULTURE RESULTS: SIGNIFICANT CHANGE UP
CULTURE RESULTS: SIGNIFICANT CHANGE UP
EOSINOPHIL # BLD AUTO: 0 K/UL — SIGNIFICANT CHANGE UP (ref 0–0.5)
EOSINOPHIL NFR BLD AUTO: 0 % — SIGNIFICANT CHANGE UP (ref 0–6)
GLUCOSE BLDC GLUCOMTR-MCNC: 209 MG/DL — HIGH (ref 70–99)
GLUCOSE BLDC GLUCOMTR-MCNC: 221 MG/DL — HIGH (ref 70–99)
GLUCOSE BLDC GLUCOMTR-MCNC: 246 MG/DL — HIGH (ref 70–99)
GLUCOSE BLDC GLUCOMTR-MCNC: 265 MG/DL — HIGH (ref 70–99)
GLUCOSE BLDC GLUCOMTR-MCNC: 285 MG/DL — HIGH (ref 70–99)
GLUCOSE SERPL-MCNC: 252 MG/DL — HIGH (ref 70–99)
HCT VFR BLD CALC: 39.7 % — SIGNIFICANT CHANGE UP (ref 34.5–45)
HGB BLD-MCNC: 13.8 G/DL — SIGNIFICANT CHANGE UP (ref 11.5–15.5)
IMM GRANULOCYTES NFR BLD AUTO: 1.1 % — SIGNIFICANT CHANGE UP (ref 0–1.5)
INR BLD: 1.1 RATIO — SIGNIFICANT CHANGE UP (ref 0.88–1.16)
LYMPHOCYTES # BLD AUTO: 1.39 K/UL — SIGNIFICANT CHANGE UP (ref 1–3.3)
LYMPHOCYTES # BLD AUTO: 8.6 % — LOW (ref 13–44)
MCHC RBC-ENTMCNC: 32.2 PG — SIGNIFICANT CHANGE UP (ref 27–34)
MCHC RBC-ENTMCNC: 34.8 GM/DL — SIGNIFICANT CHANGE UP (ref 32–36)
MCV RBC AUTO: 92.8 FL — SIGNIFICANT CHANGE UP (ref 80–100)
MONOCYTES # BLD AUTO: 0.23 K/UL — SIGNIFICANT CHANGE UP (ref 0–0.9)
MONOCYTES NFR BLD AUTO: 1.4 % — LOW (ref 2–14)
NEUTROPHILS # BLD AUTO: 14.38 K/UL — HIGH (ref 1.8–7.4)
NEUTROPHILS NFR BLD AUTO: 88.8 % — HIGH (ref 43–77)
PLATELET # BLD AUTO: 343 K/UL — SIGNIFICANT CHANGE UP (ref 150–400)
POTASSIUM SERPL-MCNC: 3.7 MMOL/L — SIGNIFICANT CHANGE UP (ref 3.5–5.3)
POTASSIUM SERPL-SCNC: 3.7 MMOL/L — SIGNIFICANT CHANGE UP (ref 3.5–5.3)
PROTHROM AB SERPL-ACNC: 12.5 SEC — SIGNIFICANT CHANGE UP (ref 10–13.1)
RBC # BLD: 4.28 M/UL — SIGNIFICANT CHANGE UP (ref 3.8–5.2)
RBC # FLD: 12.9 % — SIGNIFICANT CHANGE UP (ref 10.3–14.5)
SODIUM SERPL-SCNC: 131 MMOL/L — LOW (ref 135–145)
SPECIMEN SOURCE: SIGNIFICANT CHANGE UP
SPECIMEN SOURCE: SIGNIFICANT CHANGE UP
WBC # BLD: 16.19 K/UL — HIGH (ref 3.8–10.5)
WBC # FLD AUTO: 16.19 K/UL — HIGH (ref 3.8–10.5)
WNV IGG CSF IA-ACNC: NEGATIVE — SIGNIFICANT CHANGE UP
WNV IGM CSF IA-ACNC: NEGATIVE — SIGNIFICANT CHANGE UP

## 2018-10-17 PROCEDURE — 99232 SBSQ HOSP IP/OBS MODERATE 35: CPT

## 2018-10-17 RX ADMIN — Medication 120 MILLIGRAM(S): at 05:41

## 2018-10-17 RX ADMIN — Medication 20 MILLIGRAM(S): at 14:37

## 2018-10-17 RX ADMIN — Medication 3 MILLILITER(S): at 00:55

## 2018-10-17 RX ADMIN — LEVETIRACETAM 400 MILLIGRAM(S): 250 TABLET, FILM COATED ORAL at 17:01

## 2018-10-17 RX ADMIN — PIPERACILLIN AND TAZOBACTAM 25 GRAM(S): 4; .5 INJECTION, POWDER, LYOPHILIZED, FOR SOLUTION INTRAVENOUS at 21:22

## 2018-10-17 RX ADMIN — HEPARIN SODIUM 800 UNIT(S)/HR: 5000 INJECTION INTRAVENOUS; SUBCUTANEOUS at 09:29

## 2018-10-17 RX ADMIN — Medication 2: at 19:05

## 2018-10-17 RX ADMIN — Medication 3 MILLILITER(S): at 17:50

## 2018-10-17 RX ADMIN — Medication 250 MILLIGRAM(S): at 11:14

## 2018-10-17 RX ADMIN — Medication 120 MILLIGRAM(S): at 00:55

## 2018-10-17 RX ADMIN — Medication 120 MILLIGRAM(S): at 12:50

## 2018-10-17 RX ADMIN — LEVETIRACETAM 400 MILLIGRAM(S): 250 TABLET, FILM COATED ORAL at 06:28

## 2018-10-17 RX ADMIN — SODIUM CHLORIDE 50 MILLILITER(S): 9 INJECTION, SOLUTION INTRAVENOUS at 08:52

## 2018-10-17 RX ADMIN — PIPERACILLIN AND TAZOBACTAM 25 GRAM(S): 4; .5 INJECTION, POWDER, LYOPHILIZED, FOR SOLUTION INTRAVENOUS at 14:32

## 2018-10-17 RX ADMIN — Medication 2: at 00:43

## 2018-10-17 RX ADMIN — Medication 3: at 12:43

## 2018-10-17 RX ADMIN — Medication 2: at 06:39

## 2018-10-17 RX ADMIN — PIPERACILLIN AND TAZOBACTAM 25 GRAM(S): 4; .5 INJECTION, POWDER, LYOPHILIZED, FOR SOLUTION INTRAVENOUS at 06:38

## 2018-10-17 RX ADMIN — Medication 3 MILLILITER(S): at 05:40

## 2018-10-17 RX ADMIN — Medication 20 MILLIGRAM(S): at 01:38

## 2018-10-17 RX ADMIN — Medication 3 MILLILITER(S): at 13:07

## 2018-10-17 NOTE — SWALLOW BEDSIDE ASSESSMENT ADULT - SLP GENERAL OBSERVATIONS
Met with , wo provided some additional hx - stated that pt getting home based lang tx and PT, needs botox injections to limbs for spasticity, reported L HHA, persistent L HP, non-fluent aphasia. On regular diet,  reports some cough with thin liquids when consumes quickly and need reminding to slow down. Has COPD flares but no PNAs (until now). Indicated that she had an ENT exam (Dr. Marco Swann ) approx 1 year ago,  stated he was concerned that she "had a lot of mucous", but may have seen him for a different primary reason - > was scoped  and reported that "she had mucous in her throat" no f/u swallow study, no management per . Had initial weight loss after PEG, but then gained, almost to baseline. Pt seen , more alert, but not to baseline per [place], delayed response times, remains aphasic., can follow some commands,  reports yes/no impairment ("mixes them up" )  very weak/hypophonic  voice- change from baseline per  ? sensory loss on L

## 2018-10-17 NOTE — CONSULT NOTE ADULT - PROBLEM SELECTOR RECOMMENDATION 3
Clinically no wheezing presently  Continue bronchodilators  Hope to reduce IV steroids over next few days
will defer to primary,neurology

## 2018-10-17 NOTE — PROGRESS NOTE ADULT - ATTENDING COMMENTS
case d/w Med NP  Will tailor plan for ID issues  per course,results.Will defer to primary team on management of other issues.  Will Follow.  Beeper [phone]-azyg/afterhours/No response-[phone]

## 2018-10-17 NOTE — SWALLOW BEDSIDE ASSESSMENT ADULT - SWALLOW EVAL: DIAGNOSIS
Patient presents with known hx of jaimie-pharyngeal dysphagia with PEG placement after CVA in 2014. Pt was eventually transitioned to a p.o. diet (regular), after a course of rehab; however, family reports s/s of persistent dysphagia to thin liquids when consumed in uncontrolled volumes. Had outpt ENT exam approximately 1 year ago, with reported pooling of secretions in pharynx. Pt now s/p intubation, currently being treated for suspected aspiration PNA. Pt weak/deconditioned/hypophonic/altered. Remains an aspiration risk at current time

## 2018-10-17 NOTE — CONSULT NOTE ADULT - SUBJECTIVE AND OBJECTIVE BOX
PULM/MED CONSULT NOTE:    (Note based on exam earlier today)      from patient's chart:  HPI:  HPI: Patient is a 73y old  Female on coumadin who presented after a fall, unwitnessed.  Patient was reportedly found by family on the ground.  She was reportedly shaking and then confused, but then acted like herself.  Per ER, she had a CT head / c-spine which were negative.  However, after the initial CT scans, she began to have seizure like activity and was intubated for airway protection.  Level 1 trauma was called at time of seizure like activity.      (Primary Survey:   A - AMS so intubated, positive color change, bilateral breath sounds, later CXR confirmed good ETT position.  B - bilateral breath sounds  C - initial BP: 161/92 , HR: 78, palpable pulses in all extremities.  Hypertensive to SBPs 230s.    D - GCS 15 on arrival, decreased to GCS 10 after ativan for seizure-like activity  Exposure obtained    Secondary Survey:  General: intubated, sedated, paralyzed  HEENT: PEERL, 4mm, left periorbital ecchymosis, ETT in place,   Neck: Soft, midline trachea, atraumatic (cervical collar cleared by ER staff when pt was still mentating well, prior to trauma activation)  Chest: No chest wall tenderness, thorax stable, no ecchymosis.   Cardiac: tachycardic  Respiratory: Bilateral breath sounds, equal bilaterally.   Abdomen: Soft, non-distended, no ecchymosis.   Pelvis: Stable,   Ext: palp radial b/l UE, b/l DP palp in Lower Extrem, LUE with ecchymosis at base of 4th digit and on dorsum of hand  Back: no palpable runoff/stepoff/deformity, no abrasions.   Rectal: No ramirez blood    She had Fast Exam performed, which failed to reveal any free fluid in the abdomen (10 Oct 2018 15:16))      MEDICATIONS  (STANDING):  ALBUTerol/ipratropium for Nebulization 3 milliLiter(s) Nebulizer every 6 hours  dextrose 5%. 1000 milliLiter(s) (50 mL/Hr) IV Continuous <Continuous>  dextrose 5%. 1000 milliLiter(s) (50 mL/Hr) IV Continuous <Continuous>  dextrose 50% Injectable 12.5 Gram(s) IV Push once  dextrose 50% Injectable 25 Gram(s) IV Push once  dextrose 50% Injectable 25 Gram(s) IV Push once  heparin  Infusion. 700 Unit(s)/Hr (7 mL/Hr) IV Continuous <Continuous>  insulin lispro (HumaLOG) corrective regimen sliding scale   SubCutaneous every 6 hours  levETIRAcetam  IVPB 500 milliGRAM(s) IV Intermittent every 12 hours  methylPREDNISolone sodium succinate Injectable 20 milliGRAM(s) IV Push every 12 hours  metoprolol tartrate IVPB 10 milliGRAM(s) IV Intermittent every 6 hours  piperacillin/tazobactam IVPB. 3.375 Gram(s) IV Intermittent every 8 hours    MEDICATIONS  (PRN):  acetaminophen   Tablet .. 650 milliGRAM(s) Oral every 6 hours PRN Temp greater or equal to 38C (100.4F)  dextrose 40% Gel 15 Gram(s) Oral once PRN Blood Glucose LESS THAN 70 milliGRAM(s)/deciLiter  glucagon  Injectable 1 milliGRAM(s) IntraMuscular once PRN Glucose <70 milliGRAM(s)/deciLiter  haloperidol    Injectable 1 milliGRAM(s) IV Push every 6 hours PRN Agitation  heparin  Injectable 3500 Unit(s) IV Push every 6 hours PRN For aPTT less than 40  heparin  Injectable 1500 Unit(s) IV Push every 6 hours PRN For aPTT between 40 - 57            PAST MEDICAL & SURGICAL HISTORY:  DVT (deep venous thrombosis)  HLD (hyperlipidemia)  COPD (chronic obstructive pulmonary disease)  No significant past surgical history            SOCIAL HISTORY  Smoking History: former    PHYSICAL EXAM:  Vital Signs Last 24 Hrs  T(C): 36.8 (17 Oct 2018 19:17), Max: 36.8 (17 Oct 2018 13:31)  T(F): 98.2 (17 Oct 2018 19:17), Max: 98.3 (17 Oct 2018 13:31)  HR: 86 (17 Oct 2018 19:17) (70 - 109)  BP: 104/63 (17 Oct 2018 19:17) (104/63 - 186/80)  BP(mean): --  RR: 18 (17 Oct 2018 19:17) (17 - 18)  SpO2: 96% (17 Oct 2018 19:17) (95% - 100%)  GENERAL: Resting in bed comfortably  HEAD:  traumatic - left face and neck ecchymosis,   Pharynx no thrush  NECK: Supple,   NERVOUS SYSTEM:  Alert & Oriented X3    CHEST/LUNG: Bilateral breath sounds;  No rales, rhonchi, wheezing, or rubs  HEART: irreg irreg.  ABDOMEN: Soft, Nontender,  EXTREMITIES:  No CCE    SKIN: ecchymosis right trunk      LABS:                        13.8   16.19 )-----------( 343      ( 17 Oct 2018 09:01 )             39.7     10-17    131<L>  |  93<L>  |  17  ----------------------------<  252<H>  3.7   |  22  |  0.93    Ca    9.0      17 Oct 2018 07:17  Phos  2.8     10-16  Mg     1.9     10-16    TPro  6.8  /  Alb  3.4  /  TBili  0.4  /  DBili  x   /  AST  40  /  ALT  29  /  AlkPhos  81  10-16    PT/INR - ( 17 Oct 2018 08:59 )   PT: 12.5 sec;   INR: 1.10 ratio         PTT - ( 17 Oct 2018 08:59 )  PTT:62.7 sec    ABG - ( 16 Oct 2018 21:11 )  pH, Arterial: 7.48  pH, Blood: x     /  pCO2: 36    /  pO2: 107   / HCO3: 27    / Base Excess: 3.8   /  SaO2: 98          Microbiology  RADIOLOGY & ADDITIONAL STUDIES:    Xray:   EXAM:  XR CHEST PORTABLE URGENT 1V                            PROCEDURE DATE:  10/16/2018            INTERPRETATION:  CLINICAL INFORMATION: Tachycardia.    TECHNIQUE: Single AP radiograph of the chest dated 10/16/2018.    COMPARISON: Chest radiograph dated 10/12/2018.    FINDINGS:  Interval removal of endotracheal tube, right-sided central venous   catheter, and enteric tube.  The lungs are clear.  No pleural effusions or pneumothorax.  Heart size is within normal limits.   Old left-sided rib fractures.    IMPRESSION: Clear lungs.      CECILIO ELIZABETH M.D., RADIOLOGY RESIDENT  This document has been electronically signed.  JULIA DAVIS M.D., ATTENDING RADIOLOGIST  This document has been electronically signed. Oct 17 2018 10:41AM      CTScan:      EXAM:  CT CHEST IC                          EXAM:  CT ABDOMEN AND PELVIS IC                            PROCEDURE DATE:  10/10/2018            INTERPRETATION:  CLINICAL INFORMATION: Blunt trauma. Unwitnessed fall.   Seizure.    COMPARISON: CT chest 9/16/2015. CT abdomen and pelvis 12/5/2014.    PROCEDURE:   CT of the Chest, Abdomen and Pelvis was performed with intravenous   contrast.   Imaging was performed through the chest in the arterial phase followed by   imaging of the abdomen and pelvis in the portal venous phase.  Intravenous contrast: 90 ml Omnipaque 350. 10 ml discarded.  Oral contrast: None.  Sagittal and coronal reformats were performed.    FINDINGS:    CHEST:     LUNGS AND LARGE AIRWAYS: Patent central airways. Endotracheal tube   terminates above the bill. Centrilobular and paraseptal emphysema.   Multiple lung nodules, measuring up to 0.4 cm in the right lower lobe   (series 2, image 52). Biapical scarring, unchanged.  PLEURA: No pleural effusion.  VESSELS: Within normal limits.  HEART: Heart size is normal. No pericardial effusion.  MEDIASTINUM AND ANTIONETTE: No lymphadenopathy.  CHEST WALL AND LOWER NECK: Within normal limits.    ABDOMEN AND PELVIS:    LIVER: Left lobe cyst.  BILE DUCTS: Normal caliber.  GALLBLADDER: Within normal limits.  SPLEEN: Within normal limits.  PANCREAS: Within normal limits.  ADRENALS: Within normal limits.  KIDNEYS/URETERS: Symmetric enhancement without hydronephrosis.   Subcentimeter hypodensity in the right kidney, too small to characterize.    BLADDER: Within normal limits.  REPRODUCTIVE ORGANS: The uterus and adnexa are within normal limits.    BOWEL: No bowel obstruction. Appendix normal.  PERITONEUM: No ascites.  VESSELS:  Atherosclerotic narrowing of the takeoff of the celiac.The   superior mesenteric, renal, and inferior mesenteric arteries are patent.   No abdominal aortic aneurysm. Atherosclerotic changes. Circumaortic left   renal vein.  RETROPERITONEUM: No lymphadenopathy.    ABDOMINAL WALL: Within normal limits.  BONES: Degenerative changes. Old left rib fractures.    IMPRESSION:   No acute traumatic injury.     Multiple pulmonary nodules measuring up to 0.4 cm, for which follow-up CT   is recommended in 3-6 months. If stable, consider CT at 2 years and 4   years.    GRAHAM RHODES M.D., RADIOLOGY FELLOW  This document has been electronically signed.  HANK COELLO M.D., ATTENDING RADIOLOGIST  This document has been electronically signed. Oct 10 2018  4:16PM            Echo:    Patient name: DELTA WADE  YOB: 1945   Age: 73 (F)   MR#: [id number]  Study Date: 10/11/2018  Location: [place] [id number]: Charlotte Fajardo RDCS  Study quality: Technically difficult  Referring Physician: Asher Elliott MD  Blood Pressure: 117/79 mmHg  Height: 155 cm  Weight: 43 kg  BSA: 1.4 m2  ------------------------------------------------------------------------  PROCEDURE: Transthoracic echocardiogram with 2-D, M-Mode  and complete spectral and color flow Doppler.  INDICATION: Unspecified atrial flutter (I48.92),  Unspecified atrial fibrillation (I48.91)  ------------------------------------------------------------------------  Dimensions:    Normal Values:  LA:     2.9    2.0 - 4.0 cm  Ao:     3.0    2.0 - 3.8 cm  SEPTUM: 0.9    0.6 - 1.2 cm  PWT:    0.9    0.6 - 1.1 cm  LVIDd:  3.2    3.0 - 5.6 cm  LVIDs:  2.1    1.8 - 4.0 cm  Derived variables:  LVMI: 56 g/m2  RWT: 0.56  Fractional short: 34 %  EF (Visual Estimate): 65 %  EF (Teicholtz): 65 %  Doppler Peak Velocity(m/sec): AoV=1.0  ------------------------------------------------------------------------  Observations:  Mitral Valve: The posterior leaflet of the mitral valve is  not well visualized. Suboptimal color doppler evaluation.  Eccentric regurgitation jet that is probably moderate to  severe. This may be underestimated.  Aortic Valve/Aorta: Normal trileaflet aortic valve. Peak  transaortic valve gradient equals 4 mm Hg. No aortic valve  regurgitation seen. Peak left ventricular outflow tract  gradient equals 1 mm Hg.  Normal aortic root (Ao: 3.0 cm at the sinuses of Valsalva).  Left Atrium: Normal left atrium.  LA volume index = 17  cc/m2.  Left Ventricle: The endocardium is not well visualized. On  limited short axis images, the basal to mid inferior wall  appears hypokinetic.  Normal left ventricular internal  dimensions and wall thicknesses. Normal diastolic function  Right Heart: Normal right atrium. Normal right ventricular  size and function. Normal tricuspid valve. Mild tricuspid  regurgitation. Pulmonic valve not well visualized.  Pericardium/Pleura: Normal pericardium with trace  pericardial effusion.  Hemodynamic: Estimated right atrial pressure is 8 mm Hg.  Estimated right ventricular systolic pressure equals 17 mm  Hg, assuming right atrial pressure equals 8 mm Hg,  consistent with normal pulmonary pressures.  ------------------------------------------------------------------------  Conclusions:  1. The posterior leaflet of the mitral valve is not well  visualized. Suboptimal color doppler evaluation. Eccentric  regurgitation jet that is probably moderate to severe. This  may be underestimated.  2. Normal left ventricular internal dimensions and wall  thicknesses.  3. The endocardium is not well visualized. On limited short  axis images, the basal to mid inferior wall appears  hypokinetic.  4. Normal right ventricular size and function.  5. Estimated pulmonary artery systolic pressure equals 17  mm Hg, assuming right atrial pressure equals 8  mm Hg,  consistent with normal pulmonary pressures.  *** No previous Echo exam.  ------------------------------------------------------------------------  Confirmed on  10/11/2018 - 16:56:30 by Lashell Douglas M.D.  ------------------------------------------------------------------------

## 2018-10-17 NOTE — CONSULT NOTE ADULT - PROBLEM SELECTOR RECOMMENDATION 6
Would arrange for swallowing evaluation  Discussed with  earlier -- to consider nasal feeding tube pending swallowing eval

## 2018-10-17 NOTE — CONSULT NOTE ADULT - PROBLEM SELECTOR RECOMMENDATION 2
Review of CXRs and CT - no definite consolidation or infiltrate.    Would favor short course of abx per ID
As above  Hypoxia per primary

## 2018-10-17 NOTE — PROGRESS NOTE ADULT - SUBJECTIVE AND OBJECTIVE BOX
Patient is a 73y old  Female who presents with a chief complaint of Fall, AMS (17 Oct 2018 11:08)      SUBJECTIVE / OVERNIGHT EVENTS: overnight events noted      ROS:  Resp: No cough no sputum production  CVS: No chest pain no palpitations no orthopnea  GI: no N/V/D  : no dysuria, no hematuria  Neuro: no weakness no paresthesias  Heme: No petechiae no easy bruising  Msk: No joint pain no swelling  Skin: No rash no itching        MEDICATIONS  (STANDING):  ALBUTerol/ipratropium for Nebulization 3 milliLiter(s) Nebulizer every 6 hours  dextrose 5%. 1000 milliLiter(s) (50 mL/Hr) IV Continuous <Continuous>  dextrose 5%. 1000 milliLiter(s) (50 mL/Hr) IV Continuous <Continuous>  dextrose 50% Injectable 12.5 Gram(s) IV Push once  dextrose 50% Injectable 25 Gram(s) IV Push once  dextrose 50% Injectable 25 Gram(s) IV Push once  heparin  Infusion. 700 Unit(s)/Hr (7 mL/Hr) IV Continuous <Continuous>  insulin lispro (HumaLOG) corrective regimen sliding scale   SubCutaneous every 6 hours  levETIRAcetam  IVPB 500 milliGRAM(s) IV Intermittent every 12 hours  methylPREDNISolone sodium succinate Injectable 20 milliGRAM(s) IV Push every 12 hours  metoprolol tartrate IVPB 10 milliGRAM(s) IV Intermittent every 6 hours  piperacillin/tazobactam IVPB. 3.375 Gram(s) IV Intermittent every 8 hours    MEDICATIONS  (PRN):  acetaminophen   Tablet .. 650 milliGRAM(s) Oral every 6 hours PRN Temp greater or equal to 38C (100.4F)  dextrose 40% Gel 15 Gram(s) Oral once PRN Blood Glucose LESS THAN 70 milliGRAM(s)/deciLiter  glucagon  Injectable 1 milliGRAM(s) IntraMuscular once PRN Glucose <70 milliGRAM(s)/deciLiter  haloperidol    Injectable 1 milliGRAM(s) IV Push every 6 hours PRN Agitation  heparin  Injectable 3500 Unit(s) IV Push every 6 hours PRN For aPTT less than 40  heparin  Injectable 1500 Unit(s) IV Push every 6 hours PRN For aPTT between 40 - 57        CAPILLARY BLOOD GLUCOSE      POCT Blood Glucose.: 285 mg/dL (17 Oct 2018 12:22)  POCT Blood Glucose.: 246 mg/dL (17 Oct 2018 06:32)  POCT Blood Glucose.: 221 mg/dL (17 Oct 2018 00:31)  POCT Blood Glucose.: 215 mg/dL (16 Oct 2018 17:25)    I&O's Summary    16 Oct 2018 07:01  -  17 Oct 2018 07:00  --------------------------------------------------------  IN: 1715 mL / OUT: 0 mL / NET: 1715 mL    17 Oct 2018 07:01  -  17 Oct 2018 14:59  --------------------------------------------------------  IN: 450 mL / OUT: 0 mL / NET: 450 mL        Vital Signs Last 24 Hrs  T(C): 36.8 (17 Oct 2018 13:31), Max: 36.8 (17 Oct 2018 13:31)  T(F): 98.3 (17 Oct 2018 13:31), Max: 98.3 (17 Oct 2018 13:31)  HR: 70 (17 Oct 2018 13:31) (70 - 120)  BP: 115/93 (17 Oct 2018 13:31) (112/81 - 186/80)  BP(mean): --  RR: 18 (17 Oct 2018 13:31) (17 - 18)  SpO2: 95% (17 Oct 2018 13:31) (92% - 100%)    PHYSICAL EXAM:  GENERAL: NAD, well-developed  HEAD:  Atraumatic, Normocephalic  EYES: EOMI, PERRLA, conjunctiva and sclera clear  NECK: Supple, No JVD  CHEST/LUNG: no rhonchi, no wheeze, clear to auscultation bilaterally  HEART: S1 S2; No rubs or gallops, no murmurs appreciated  ABDOMEN: Soft, Nontender, Nondistended; Bowel sounds present  EXTREMITIES:  No clubbing or cyanosis, + Peripheral Pulses,  no edema  PSYCH: AO x 3 appropriate affect  NEUROLOGY: non-focal, motor and sensory systems intact  SKIN: No rashes or lesions    LABS:                        13.8   16.19 )-----------( 343      ( 17 Oct 2018 09:01 )             39.7     10-17    131<L>  |  93<L>  |  17  ----------------------------<  252<H>  3.7   |  22  |  0.93    Ca    9.0      17 Oct 2018 07:17  Phos  2.8     10-16  Mg     1.9     10-16    TPro  6.8  /  Alb  3.4  /  TBili  0.4  /  DBili  x   /  AST  40  /  ALT  29  /  AlkPhos  81  10-16    PT/INR - ( 17 Oct 2018 08:59 )   PT: 12.5 sec;   INR: 1.10 ratio         PTT - ( 17 Oct 2018 08:59 )  PTT:62.7 sec            All consultant(s) notes reviewed and care discussed with other providers    Contact Number, Dr Styles [phone]

## 2018-10-17 NOTE — PROGRESS NOTE ADULT - SUBJECTIVE AND OBJECTIVE BOX
Patient is a 73y old  Female who presents with a chief complaint of Fall, AMS (16 Oct 2018 15:09)    Being followed by ID for ? COPD exacerbation V asp PNA    Interval history:still some cough  No sputum   No acute events      ROS:  No SOB,CP  No N/V/D./abd pain  No other complaints      Antimicrobials:    piperacillin/tazobactam IVPB. 3.375 Gram(s) IV Intermittent every 8 hours  vancomycin  IVPB 1000 milliGRAM(s) IV Intermittent every 12 hours  vancomycin  IVPB        Other medications reviewed    Vital Signs Last 24 Hrs  T(C): 36.6 (10-17-18 @ 05:29), Max: 36.7 (10-16-18 @ 16:27)  T(F): 97.8 (10-17-18 @ 05:29), Max: 98.1 (10-16-18 @ 16:27)  HR: 98 (10-17-18 @ 06:40) (82 - 120)  BP: 156/90 (10-17-18 @ 06:40) (112/81 - 186/80)  BP(mean): --  RR: 18 (10-17-18 @ 06:40) (17 - 18)  SpO2: 98% (10-17-18 @ 06:40) (92% - 100%)    Physical Exam:        HEENT PERRLA EOMI    No oral exudate or erythema    Chest Good AE,occ crackles     CVS RRR S1 S2 WNl No murmur or rub or gallop    Abd soft BS normal No tenderness no masses    IV site no erythema tenderness or discharge    CNS AAO X 3 no focal    Lab Data:                          13.8   16.19 )-----------( 343      ( 17 Oct 2018 09:01 )             39.7   WBC Count: 16.19 (10-17-18 @ 09:01)  WBC Count: 10.6 (10-16-18 @ 07:18)  WBC Count: 9.5 (10-15-18 @ 00:12)  WBC Count: 6.5 (10-14-18 @ 01:08)  WBC Count: 9.8 (10-13-18 @ 13:51)  WBC Count: 11.9 (10-13-18 @ 01:39)  WBC Count: 18.4 (10-12-18 @ 00:45)  WBC Count: 15.4 (10-11-18 @ 15:42)  WBC Count: 15.0 (10-11-18 @ 01:26)  WBC Count: 18.6 (10-10-18 @ 16:27)  WBC Count: 6.6 (10-10-18 @ 13:00)      10-17    131<L>  |  93<L>  |  17  ----------------------------<  252<H>  3.7   |  22  |  0.93    Ca    9.0      17 Oct 2018 07:17  Phos  2.8     10-16  Mg     1.9     10-16    TPro  6.8  /  Alb  3.4  /  TBili  0.4  /  DBili  x   /  AST  40  /  ALT  29  /  AlkPhos  81  10-16        Culture - Acid Fast - CSF (collected 12 Oct 2018 21:07)  Source: .CSF    Culture - Fungal, CSF (collected 12 Oct 2018 21:07)  Source: .CSF CSF  Preliminary Report (15 Oct 2018 09:29):    Testing in progress    Culture - CSF with Gram Stain (collected 12 Oct 2018 21:07)  Source: .CSF CSF  Gram Stain (12 Oct 2018 21:42):    polymorphonuclear leukocytes seen per low power field    No organisms seen per oil power field by cytocentrifuge  Final Report (15 Oct 2018 16:26):    No growth at 3 days.    Culture - Blood (collected 12 Oct 2018 16:33)  Source: .Blood Blood-Venous  Preliminary Report (13 Oct 2018 17:01):    No growth to date.    Culture - Blood (collected 12 Oct 2018 16:33)  Source: .Blood Blood  Preliminary Report (13 Oct 2018 17:01):    No growth to date.    < from: Xray Chest 1 View- PORTABLE-Urgent (10.16.18 @ 22:52) >    IMPRESSION: Clear lungs.      < end of copied text >

## 2018-10-17 NOTE — PROGRESS NOTE ADULT - PROBLEM SELECTOR PLAN 4
appear to have resolved  DVT study negative  will continue IV heparin  unable to give warfarin till cleared by speech and swallow eval

## 2018-10-17 NOTE — SWALLOW BEDSIDE ASSESSMENT ADULT - PHARYNGEAL PHASE
unable to clear stasis with successive swallows/Delayed pharyngeal swallow/Multiple swallows/Complaints of pharyngeal stasis/Decreased laryngeal elevation Delayed pharyngeal swallow/Decreased laryngeal elevation/Multiple swallows

## 2018-10-17 NOTE — SWALLOW BEDSIDE ASSESSMENT ADULT - ORAL PHASE
Delayed oral transit time/Oral transit time noted to be  8 seconds. Minimal residue Delayed oral transit time

## 2018-10-17 NOTE — PROVIDER CONTACT NOTE (MEDICATION) - SITUATION
Patients /81, HR 82. Unclear if lopressor should be administered. Patient also npo except meds, pending speech and swallow, unclear if coumadin should be administered

## 2018-10-18 DIAGNOSIS — N17.9 ACUTE KIDNEY FAILURE, UNSPECIFIED: ICD-10-CM

## 2018-10-18 LAB
ANION GAP SERPL CALC-SCNC: 19 MMOL/L — HIGH (ref 5–17)
APTT BLD: 106.4 SEC — HIGH (ref 27.5–37.4)
APTT BLD: 89 SEC — HIGH (ref 27.5–37.4)
BASOPHILS # BLD AUTO: 0 K/UL — SIGNIFICANT CHANGE UP (ref 0–0.2)
BUN SERPL-MCNC: 37 MG/DL — HIGH (ref 7–23)
CALCIUM SERPL-MCNC: 8.3 MG/DL — LOW (ref 8.4–10.5)
CHLORIDE SERPL-SCNC: 87 MMOL/L — LOW (ref 96–108)
CO2 SERPL-SCNC: 19 MMOL/L — LOW (ref 22–31)
CREAT SERPL-MCNC: 1.98 MG/DL — HIGH (ref 0.5–1.3)
EOSINOPHIL # BLD AUTO: 0.1 K/UL — SIGNIFICANT CHANGE UP (ref 0–0.5)
GLUCOSE BLDC GLUCOMTR-MCNC: 180 MG/DL — HIGH (ref 70–99)
GLUCOSE BLDC GLUCOMTR-MCNC: 215 MG/DL — HIGH (ref 70–99)
GLUCOSE BLDC GLUCOMTR-MCNC: 222 MG/DL — HIGH (ref 70–99)
GLUCOSE BLDC GLUCOMTR-MCNC: 222 MG/DL — HIGH (ref 70–99)
GLUCOSE BLDC GLUCOMTR-MCNC: 279 MG/DL — HIGH (ref 70–99)
GLUCOSE SERPL-MCNC: 222 MG/DL — HIGH (ref 70–99)
HCT VFR BLD CALC: 39.3 % — SIGNIFICANT CHANGE UP (ref 34.5–45)
HGB BLD-MCNC: 13.9 G/DL — SIGNIFICANT CHANGE UP (ref 11.5–15.5)
INR BLD: 1.19 RATIO — HIGH (ref 0.88–1.16)
LYMPHOCYTES # BLD AUTO: 1.3 K/UL — SIGNIFICANT CHANGE UP (ref 1–3.3)
LYMPHOCYTES # BLD AUTO: 4 % — LOW (ref 13–44)
MCHC RBC-ENTMCNC: 32.9 PG — SIGNIFICANT CHANGE UP (ref 27–34)
MCHC RBC-ENTMCNC: 35.2 GM/DL — SIGNIFICANT CHANGE UP (ref 32–36)
MCV RBC AUTO: 93.5 FL — SIGNIFICANT CHANGE UP (ref 80–100)
MONOCYTES # BLD AUTO: 1.5 K/UL — HIGH (ref 0–0.9)
MONOCYTES NFR BLD AUTO: 7 % — SIGNIFICANT CHANGE UP (ref 2–14)
MYELOCYTES NFR BLD: 2 % — HIGH (ref 0–0)
NEUTROPHILS # BLD AUTO: 15 K/UL — HIGH (ref 1.8–7.4)
NEUTROPHILS NFR BLD AUTO: 87 % — HIGH (ref 43–77)
PLAT MORPH BLD: NORMAL — SIGNIFICANT CHANGE UP
PLATELET # BLD AUTO: 310 K/UL — SIGNIFICANT CHANGE UP (ref 150–400)
POTASSIUM SERPL-MCNC: 4.2 MMOL/L — SIGNIFICANT CHANGE UP (ref 3.5–5.3)
POTASSIUM SERPL-SCNC: 4.2 MMOL/L — SIGNIFICANT CHANGE UP (ref 3.5–5.3)
PROTHROM AB SERPL-ACNC: 13 SEC — HIGH (ref 9.8–12.7)
RBC # BLD: 4.21 M/UL — SIGNIFICANT CHANGE UP (ref 3.8–5.2)
RBC # FLD: 11.6 % — SIGNIFICANT CHANGE UP (ref 10.3–14.5)
RBC BLD AUTO: SIGNIFICANT CHANGE UP
SODIUM SERPL-SCNC: 125 MMOL/L — LOW (ref 135–145)
WBC # BLD: 17.9 K/UL — HIGH (ref 3.8–10.5)
WBC # FLD AUTO: 17.9 K/UL — HIGH (ref 3.8–10.5)

## 2018-10-18 PROCEDURE — 71045 X-RAY EXAM CHEST 1 VIEW: CPT | Mod: 26

## 2018-10-18 PROCEDURE — 99232 SBSQ HOSP IP/OBS MODERATE 35: CPT

## 2018-10-18 RX ORDER — SODIUM CHLORIDE 9 MG/ML
1000 INJECTION INTRAMUSCULAR; INTRAVENOUS; SUBCUTANEOUS
Qty: 0 | Refills: 0 | Status: DISCONTINUED | OUTPATIENT
Start: 2018-10-18 | End: 2018-10-19

## 2018-10-18 RX ADMIN — Medication 2: at 12:32

## 2018-10-18 RX ADMIN — LEVETIRACETAM 400 MILLIGRAM(S): 250 TABLET, FILM COATED ORAL at 04:08

## 2018-10-18 RX ADMIN — Medication 3 MILLILITER(S): at 05:33

## 2018-10-18 RX ADMIN — Medication 3: at 00:38

## 2018-10-18 RX ADMIN — Medication 20 MILLIGRAM(S): at 02:00

## 2018-10-18 RX ADMIN — LEVETIRACETAM 400 MILLIGRAM(S): 250 TABLET, FILM COATED ORAL at 16:50

## 2018-10-18 RX ADMIN — Medication 3 MILLILITER(S): at 17:34

## 2018-10-18 RX ADMIN — Medication 120 MILLIGRAM(S): at 11:19

## 2018-10-18 RX ADMIN — Medication 120 MILLIGRAM(S): at 17:34

## 2018-10-18 RX ADMIN — HEPARIN SODIUM 700 UNIT(S)/HR: 5000 INJECTION INTRAVENOUS; SUBCUTANEOUS at 18:25

## 2018-10-18 RX ADMIN — PIPERACILLIN AND TAZOBACTAM 25 GRAM(S): 4; .5 INJECTION, POWDER, LYOPHILIZED, FOR SOLUTION INTRAVENOUS at 13:41

## 2018-10-18 RX ADMIN — HEPARIN SODIUM 700 UNIT(S)/HR: 5000 INJECTION INTRAVENOUS; SUBCUTANEOUS at 11:16

## 2018-10-18 RX ADMIN — PIPERACILLIN AND TAZOBACTAM 25 GRAM(S): 4; .5 INJECTION, POWDER, LYOPHILIZED, FOR SOLUTION INTRAVENOUS at 21:32

## 2018-10-18 RX ADMIN — Medication 120 MILLIGRAM(S): at 00:38

## 2018-10-18 RX ADMIN — Medication 2: at 06:43

## 2018-10-18 RX ADMIN — Medication 3 MILLILITER(S): at 12:16

## 2018-10-18 RX ADMIN — Medication 20 MILLIGRAM(S): at 13:42

## 2018-10-18 RX ADMIN — PIPERACILLIN AND TAZOBACTAM 25 GRAM(S): 4; .5 INJECTION, POWDER, LYOPHILIZED, FOR SOLUTION INTRAVENOUS at 05:33

## 2018-10-18 RX ADMIN — Medication 1: at 17:40

## 2018-10-18 RX ADMIN — Medication 120 MILLIGRAM(S): at 05:00

## 2018-10-18 RX ADMIN — Medication 3 MILLILITER(S): at 00:38

## 2018-10-18 RX ADMIN — SODIUM CHLORIDE 75 MILLILITER(S): 9 INJECTION INTRAMUSCULAR; INTRAVENOUS; SUBCUTANEOUS at 17:34

## 2018-10-18 RX ADMIN — SODIUM CHLORIDE 75 MILLILITER(S): 9 INJECTION INTRAMUSCULAR; INTRAVENOUS; SUBCUTANEOUS at 17:45

## 2018-10-18 NOTE — PROGRESS NOTE ADULT - ATTENDING COMMENTS
case d/w med Np  Will tailor plan for ID issues  per course,results.Will defer to primary team on management of other issues.  Will Follow.  Beeper [phone]/afterhours/No [phone]

## 2018-10-18 NOTE — CONSULT NOTE ADULT - PROBLEM SELECTOR RECOMMENDATION 9
Patient extubated two days ago  Continue supplemental O2;  can reduce FiO2 to maintain sats>90%   Continue bronchodilators.
npo   likely will need NGT  may consider repeat swallow next week when more alert  if she fails next week when more alert or if she is unable to take in her caloric needs orally she may need a PEG  d/w family
? PNa  v COPD exacerbation  D34 of abx  Clinically is better though still requiring O2 supplementation  Would continue zosyn for now  If stable could soon stop/de-escalate  Monitor for s/s any other nosocomial process

## 2018-10-18 NOTE — PROGRESS NOTE ADULT - ASSESSMENT
74 yo woman with h/o COPD,DVT on AC  s/p fall/passing out  ? seizure  s/p respiratory failure -mechanical ventilation  Low grade fever but resolved  Now extubated  Still with some cough and sputum  Hypoxia improving   CXR clear   Cultures as above  ? Aspiration  ? COPD exacerbation  No s/s any other foci-workup for CNS infection and LP negative  Recommend:

## 2018-10-18 NOTE — CONSULT NOTE ADULT - PROBLEM SELECTOR PROBLEM 1
Acute respiratory failure, unspecified whether with hypoxia or hypercapnia
Dysphagia, unspecified type
Pneumonia, bacterial

## 2018-10-18 NOTE — PROGRESS NOTE ADULT - PROBLEM SELECTOR PLAN 4
Seen by speech path  Feeding tube just placed.  Once position confirmed, to begin water/feedings  Note: If patient continues to have nasal sensitivity with tube in nostril, can use lidocaine jelly in the nostril PRN

## 2018-10-18 NOTE — PROGRESS NOTE ADULT - SUBJECTIVE AND OBJECTIVE BOX
Patient is a 73y old  Female who presents with a chief complaint of Fall, AMS (18 Oct 2018 14:42)    SUBJECTIVE / OVERNIGHT EVENTS: no overnight events  still lethargic    ROS:  Resp: No cough no sputum production  CVS: No chest pain no palpitations no orthopnea  GI: no N/V/D  : no dysuria, no hematuria  Neuro: no weakness no paresthesias  Heme: No petechiae no easy bruising  Msk: No joint pain no swelling  Skin: No rash no itching        MEDICATIONS  (STANDING):  ALBUTerol/ipratropium for Nebulization 3 milliLiter(s) Nebulizer every 6 hours  dextrose 5%. 1000 milliLiter(s) (50 mL/Hr) IV Continuous <Continuous>  dextrose 5%. 1000 milliLiter(s) (50 mL/Hr) IV Continuous <Continuous>  dextrose 50% Injectable 12.5 Gram(s) IV Push once  dextrose 50% Injectable 25 Gram(s) IV Push once  dextrose 50% Injectable 25 Gram(s) IV Push once  heparin  Infusion. 700 Unit(s)/Hr (7 mL/Hr) IV Continuous <Continuous>  insulin lispro (HumaLOG) corrective regimen sliding scale   SubCutaneous every 6 hours  levETIRAcetam  IVPB 500 milliGRAM(s) IV Intermittent every 12 hours  methylPREDNISolone sodium succinate Injectable 20 milliGRAM(s) IV Push every 12 hours  metoprolol tartrate IVPB 10 milliGRAM(s) IV Intermittent every 6 hours  piperacillin/tazobactam IVPB. 3.375 Gram(s) IV Intermittent every 8 hours    MEDICATIONS  (PRN):  acetaminophen   Tablet .. 650 milliGRAM(s) Oral every 6 hours PRN Temp greater or equal to 38C (100.4F)  dextrose 40% Gel 15 Gram(s) Oral once PRN Blood Glucose LESS THAN 70 milliGRAM(s)/deciLiter  glucagon  Injectable 1 milliGRAM(s) IntraMuscular once PRN Glucose <70 milliGRAM(s)/deciLiter  haloperidol    Injectable 1 milliGRAM(s) IV Push every 6 hours PRN Agitation  heparin  Injectable 3500 Unit(s) IV Push every 6 hours PRN For aPTT less than 40  heparin  Injectable 1500 Unit(s) IV Push every 6 hours PRN For aPTT between 40 - 57        CAPILLARY BLOOD GLUCOSE      POCT Blood Glucose.: 180 mg/dL (18 Oct 2018 17:08)  POCT Blood Glucose.: 215 mg/dL (18 Oct 2018 12:17)  POCT Blood Glucose.: 222 mg/dL (18 Oct 2018 06:41)  POCT Blood Glucose.: 279 mg/dL (18 Oct 2018 00:12)  POCT Blood Glucose.: 222 mg/dL (18 Oct 2018 00:10)  POCT Blood Glucose.: 265 mg/dL (17 Oct 2018 22:16)  POCT Blood Glucose.: 209 mg/dL (17 Oct 2018 18:21)    I&O's Summary    17 Oct 2018 07:01  -  18 Oct 2018 07:00  --------------------------------------------------------  IN: 1646 mL / OUT: 0 mL / NET: 1646 mL    18 Oct 2018 07:01  -  18 Oct 2018 17:14  --------------------------------------------------------  IN: 487 mL / OUT: 0 mL / NET: 487 mL        Vital Signs Last 24 Hrs  T(C): 36.9 (18 Oct 2018 16:31), Max: 36.9 (18 Oct 2018 11:15)  T(F): 98.5 (18 Oct 2018 16:31), Max: 98.5 (18 Oct 2018 16:31)  HR: 70 (18 Oct 2018 16:31) (70 - 96)  BP: 127/82 (18 Oct 2018 16:31) (104/63 - 138/72)  BP(mean): --  RR: 18 (18 Oct 2018 16:31) (18 - 93)  SpO2: 93% (18 Oct 2018 16:31) (93% - 98%)    PHYSICAL EXAM:  GENERAL: NAD, asthenic  HEAD:  left facial hematoma, Normocephalic  EYES: EOMI, PERRLA, conjunctiva and sclera clear  NECK: Supple, No JVD  CHEST/LUNG: no rhonchi, minimal expiratory wheeze bilaterally   HEART: S1 S2; No rubs or gallops, no murmurs appreciated  ABDOMEN: Soft, Nontender, Nondistended; Bowel sounds present  EXTREMITIES:  No clubbing or cyanosis, + Peripheral Pulses,  no edema  PSYCH: Alert, fully arousable   NEUROLOGY: left UE contracted since CVA  left leg 2 - 3/5 power  SKIN: No rashes or lesions    LABS:                        13.9   17.9  )-----------( 310      ( 18 Oct 2018 10:11 )             39.3     10-18    125<L>  |  87<L>  |  37<H>  ----------------------------<  222<H>  4.2   |  19<L>  |  1.98<H>    Ca    8.3<L>      18 Oct 2018 10:05      PT/INR - ( 18 Oct 2018 10:12 )   PT: 13.0 sec;   INR: 1.19 ratio         PTT - ( 18 Oct 2018 10:12 )  PTT:106.4 sec            All consultant(s) notes reviewed and care discussed with other providers    Contact Number, Dr Styles [phone]

## 2018-10-18 NOTE — PROGRESS NOTE ADULT - PROBLEM SELECTOR PLAN 2
ID help appreciated  will follow recommendations  continue Zosyn for now  no need for vancomycin likely poor nutrition  discontinue D% change to NS at 75/hr  monitor daily

## 2018-10-18 NOTE — CHART NOTE - NSCHARTNOTEFT_GEN_A_CORE
Consult requested for enteral nutrition support. Patient noted with poor mental status not able to cooperate with   SLP bedside swallow test with recommendation for NPO non oral means of nutrition and hydration secondary to high aspiration risk.     Patient transferred from MICU, admission history and hospital course:    73 F with COPD, CVA, L hemiparesis, dysphagia s/p PEG,  admitted post fall,  acute resp failure with hypoxia requiring intubation and seizures.  Septic shock likely 2/2 aspiration pneumonia.  Unable to wean today due to copious secretions. Cont aggressive chest PT/ pulm toilet, steroids and bronchodilators. BP stable off pressors.   -  Source: Patient [ ]    Family [ ]     other [X ] medical record/team    Diet : NPO      Patient reports [ ] nausea  [ ] vomiting [ ] diarrhea [ ] constipation  [ ]chewing problems [ ] swallowing issues  [ ] other:      PO intake:  < 50% [ ] 50-75% [ ]   % [ ]  other :NA     Source for PO intake [ ] Patient [ ] family [ ] chart [ ] staff [ ] other     Enteral /Parenteral Nutrition:  see recommendation below    Current Weight: 46.5kg  Dosing/admit Weight Change 42.8kg, 94.3  lbs      Pertinent Medications: MEDICATIONS  (STANDING):  ALBUTerol/ipratropium for Nebulization 3 milliLiter(s) Nebulizer every 6 hours  dextrose 5%. 1000 milliLiter(s) (50 mL/Hr) IV Continuous <Continuous>  dextrose 5%. 1000 milliLiter(s) (50 mL/Hr) IV Continuous <Continuous>  dextrose 50% Injectable 12.5 Gram(s) IV Push once  dextrose 50% Injectable 25 Gram(s) IV Push once  dextrose 50% Injectable 25 Gram(s) IV Push once  heparin  Infusion. 700 Unit(s)/Hr (7 mL/Hr) IV Continuous <Continuous>  insulin lispro (HumaLOG) corrective regimen sliding scale   SubCutaneous every 6 hours  levETIRAcetam  IVPB 500 milliGRAM(s) IV Intermittent every 12 hours  methylPREDNISolone sodium succinate Injectable 20 milliGRAM(s) IV Push every 12 hours  metoprolol tartrate IVPB 10 milliGRAM(s) IV Intermittent every 6 hours  piperacillin/tazobactam IVPB. 3.375 Gram(s) IV Intermittent every 8 hours    MEDICATIONS  (PRN):  acetaminophen   Tablet .. 650 milliGRAM(s) Oral every 6 hours PRN Temp greater or equal to 38C (100.4F)  dextrose 40% Gel 15 Gram(s) Oral once PRN Blood Glucose LESS THAN 70 milliGRAM(s)/deciLiter  glucagon  Injectable 1 milliGRAM(s) IntraMuscular once PRN Glucose <70 milliGRAM(s)/deciLiter  haloperidol    Injectable 1 milliGRAM(s) IV Push every 6 hours PRN Agitation  heparin  Injectable 3500 Unit(s) IV Push every 6 hours PRN For aPTT less than 40  heparin  Injectable 1500 Unit(s) IV Push every 6 hours PRN For aPTT between 40 - 57    Pertinent Labs:  10-18 Na125 mmol/L<L> Glu 222 mg/dL<H> K+ 4.2 mmol/L Cr  1.98 mg/dL<H> BUN 37 mg/dL<H> 10-16 Phos 2.8 mg/dL 10-16 Alb 3.4 g/dL 10-10 [id number] 5.4 % 10-13 Chol --    LDL --    HDL --    Trig 109 mg/dL      Skin:   no pressure breakdown noted    Estimated Needs:   [X ] no change since previous assessment  [ ] recalculated:       Previous Nutrition Diagnosis: NONE         Nutrition Diagnosis is [X ] ongoing  [ ] resolved [ ] not applicable          New Nutrition Diagnosis: [X] not applicable        Recommend        [X ]  Enteral Nutrition: Glucerna 1.2 50ml/hr x 18 hrs to provide 900ml, 1080 cals, 25/kg,  protein 54gm, 1.26gm/kg, ,  based  dosing wt 42.8kg  free water in formula 724ml, defer added water per medical team          Monitoring and Evaluation:      [ X] Tolerance to tube feeding [x ] weights [x ] follow up per protocol    [ ] other: Consult requested for enteral nutrition support. Patient noted with poor mental status not able to cooperate with   SLP bedside swallow test with recommendation for NPO non oral means of nutrition and hydration secondary to high aspiration risk.     Patient transferred from MICU, admission history and hospital course:    73 F with COPD, CVA, L hemiparesis, dysphagia s/p PEG,  admitted post fall,  acute resp failure with hypoxia requiring intubation, now extubated on room air,  and seizures.    Per chart Septic shock likely 2/2 aspiration pneumonia.     -  Source: Patient [ ]    Family [ ]     other [X ] medical record/team    Diet : NPO      Patient reports: nonverbal at this time      PO intake:  < 50% [ ] 50-75% [ ]   % [ ]  other :NA     Source for PO intake [ ] Patient [ ] family [ ] chart [ ] staff [ ] other     Enteral /Parenteral Nutrition:  see recommendation below    Current Weight: 46.5kg  Dosing/admit Weight Change 42.8kg, 94.3  lbs      Pertinent Medications: MEDICATIONS  (STANDING):  ALBUTerol/ipratropium for Nebulization 3 milliLiter(s) Nebulizer every 6 hours  dextrose 5%. 1000 milliLiter(s) (50 mL/Hr) IV Continuous <Continuous>  dextrose 5%. 1000 milliLiter(s) (50 mL/Hr) IV Continuous <Continuous>  dextrose 50% Injectable 12.5 Gram(s) IV Push once  dextrose 50% Injectable 25 Gram(s) IV Push once  dextrose 50% Injectable 25 Gram(s) IV Push once  heparin  Infusion. 700 Unit(s)/Hr (7 mL/Hr) IV Continuous <Continuous>  insulin lispro (HumaLOG) corrective regimen sliding scale   SubCutaneous every 6 hours  levETIRAcetam  IVPB 500 milliGRAM(s) IV Intermittent every 12 hours  methylPREDNISolone sodium succinate Injectable 20 milliGRAM(s) IV Push every 12 hours  metoprolol tartrate IVPB 10 milliGRAM(s) IV Intermittent every 6 hours  piperacillin/tazobactam IVPB. 3.375 Gram(s) IV Intermittent every 8 hours    MEDICATIONS  (PRN):  acetaminophen   Tablet .. 650 milliGRAM(s) Oral every 6 hours PRN Temp greater or equal to 38C (100.4F)  dextrose 40% Gel 15 Gram(s) Oral once PRN Blood Glucose LESS THAN 70 milliGRAM(s)/deciLiter  glucagon  Injectable 1 milliGRAM(s) IntraMuscular once PRN Glucose <70 milliGRAM(s)/deciLiter  haloperidol    Injectable 1 milliGRAM(s) IV Push every 6 hours PRN Agitation  heparin  Injectable 3500 Unit(s) IV Push every 6 hours PRN For aPTT less than 40  heparin  Injectable 1500 Unit(s) IV Push every 6 hours PRN For aPTT between 40 - 57    Pertinent Labs:  10-18 Na125 mmol/L<L> Glu 222 mg/dL<H> K+ 4.2 mmol/L Cr  1.98 mg/dL<H> BUN 37 mg/dL<H> 10-16 Phos 2.8 mg/dL 10-16 Alb 3.4 g/dL 10-10 [id number] 5.4 % 10-13 Chol --    LDL --    HDL --    Trig 109 mg/dL      Skin:   no pressure breakdown noted    Estimated Needs:   [X ] no change since previous assessment  [ ] recalculated:       Previous Nutrition Diagnosis: NONE         Nutrition Diagnosis is [X ] ongoing  [ ] resolved [ ] not applicable          New Nutrition Diagnosis: [X] not applicable        Recommend        [X ]  Enteral Nutrition: Glucerna 1.2 50ml/hr x 18 hrs to provide 900ml, 1080 cals, 25/kg,  protein 54gm, 1.26gm/kg, ,  based  dosing wt 42.8kg  free water in formula 724ml, defer added water per medical team          Monitoring and Evaluation:      [ X] Tolerance to tube feeding [x ] weights [x ] follow up per protocol    [ ] other:

## 2018-10-18 NOTE — PROGRESS NOTE ADULT - SUBJECTIVE AND OBJECTIVE BOX
Patient is a 73y old  Female who presents with a chief complaint of Fall, AMS (17 Oct 2018 19:35)    Being followed by ID for pneumonia    Interval history:feelsw ell  some cough  Took off O2-93% on RA at rest  No acute events      ROS:  Nosputum,SOB,CP  No N/V/D./abd pain  No other complaints      Antimicrobials:    piperacillin/tazobactam IVPB. 3.375 Gram(s) IV Intermittent every 8 hours    Other medications reviewed  solumedrol     Vital Signs Last 24 Hrs  T(C): 36.8 (10-18-18 @ 04:03), Max: 36.8 (10-17-18 @ 13:31)  T(F): 98.3 (10-18-18 @ 04:03), Max: 98.3 (10-17-18 @ 13:31)  HR: 86 (10-18-18 @ 05:33) (70 - 104)  BP: 130/82 (10-18-18 @ 05:33) (104/63 - 144/94)  BP(mean): --  RR: 18 (10-18-18 @ 04:03) (18 - 18)  SpO2: 97% (10-17-18 @ 23:42) (95% - 99%)    Physical Exam:        HEENT PERRLA EOMI    No oral exudate or erythema    Chest Good AE,bilateral crackles  wheezes    CVS RRR S1 S2 WNl No murmur or rub or gallop    Abd soft BS normal No tenderness no masses    IV site no erythema tenderness or discharge    CNS AAO X 3 no focal    Lab Data:                          13.8   16.19 )-----------( 343      ( 17 Oct 2018 09:01 )             39.7       10-17    131<L>  |  93<L>  |  17  ----------------------------<  252<H>  3.7   |  22  |  0.93    Ca    9.0      17 Oct 2018 07:17

## 2018-10-18 NOTE — PROGRESS NOTE ADULT - PROBLEM SELECTOR PLAN 3
Neuro recommendations appreciated  will continue IV Keppra 500 BID   transition to po once cleared by speech and swallow service ID help appreciated  will follow recommendations  continue Zosyn for now  no need for vancomycin

## 2018-10-18 NOTE — SWALLOW BEDSIDE ASSESSMENT ADULT - SLP GENERAL OBSERVATIONS
Pt seen for f/u assessment. Family reported pt sleeping all day. Upon encounter, Pt poorly responsive - no arousal to multimodal and noxious stimulation. Only briefly alerted to positional change and deep tactile/noxious stimulation. Not focusing on speaker/no attention/no establishment of eye contact. Required 4 person stimulation x approx 7 min and sternal rub to arouse. MS not optimized for swallow study or oral intake at present. Continues to present as an aspiration risk. Pt seen for f/u assessment. Family reported pt sleeping all day. Upon encounter, Pt poorly responsive, appears much more altered than day prior and NP Kimberly called in to assess - no arousal to multimodal and noxious stimulation. Only briefly alerted to positional change and deep tactile/noxious stimulation. Not focusing on speaker/no attention/no establishment of eye contact. Required 4 person stimulation x approx 7 min and sternal rub to arouse. MS not optimized for swallow study or oral intake at present. Continues to present as an aspiration risk.

## 2018-10-18 NOTE — CHART NOTE - NSCHARTNOTEFT_GEN_A_CORE
Kofeed tube placed in right nostril. Pt tolerated procedure well. Spoke to radiology, tip is in the stomach-prelim in PACS. Nutrition consulted. Will start Glucerna and increase gradually to goal of 40cc/24 hours. D/w attending.

## 2018-10-18 NOTE — PROGRESS NOTE ADULT - PROBLEM SELECTOR PLAN 4
appear to have resolved  DVT study negative  will continue IV heparin  unable to give warfarin till cleared by speech and swallow eval Neuro recommendations appreciated  will continue IV Keppra 500 BID   transition to po once cleared by speech and swallow service

## 2018-10-18 NOTE — PROGRESS NOTE ADULT - PROBLEM SELECTOR PLAN 6
h/o old CVA on Xarelto hence patient on warfarin  on hold for now till PEG issue resolved pulmonary help appreciated

## 2018-10-18 NOTE — SWALLOW BEDSIDE ASSESSMENT ADULT - SWALLOW EVAL: DIAGNOSIS
Pt seen for f/u assessment. Family reported pt sleeping all day. Upon encounter, Pt poorly responsive - no arousal to multimodal and noxious stimulation. Only briefly alerted to positional change and deep tactile/noxious stimulation. Not focusing on speaker/no attention/no establishment of eye contact. Required 4 person stimulation x several minutes and sternal rub to arouse. MS not optimized for swallow study or oral intake at present. Continues to present as an aspiration risk. Pt with known hx of dysphagia, currently being treated for aspiration PNA. Pt with AMS that does not support oral intake at current time

## 2018-10-18 NOTE — PROGRESS NOTE ADULT - SUBJECTIVE AND OBJECTIVE BOX
PULM MED/CC PROGRESS NOTE:      awake, alert comfortable.  Family at bedside      MEDICATIONS  (STANDING):  ALBUTerol/ipratropium for Nebulization 3 milliLiter(s) Nebulizer every 6 hours  dextrose 5%. 1000 milliLiter(s) (50 mL/Hr) IV Continuous <Continuous>  dextrose 5%. 1000 milliLiter(s) (50 mL/Hr) IV Continuous <Continuous>  dextrose 50% Injectable 12.5 Gram(s) IV Push once  dextrose 50% Injectable 25 Gram(s) IV Push once  dextrose 50% Injectable 25 Gram(s) IV Push once  heparin  Infusion. 700 Unit(s)/Hr (7 mL/Hr) IV Continuous <Continuous>  insulin lispro (HumaLOG) corrective regimen sliding scale   SubCutaneous every 6 hours  levETIRAcetam  IVPB 500 milliGRAM(s) IV Intermittent every 12 hours  methylPREDNISolone sodium succinate Injectable 20 milliGRAM(s) IV Push every 12 hours  metoprolol tartrate IVPB 10 milliGRAM(s) IV Intermittent every 6 hours  piperacillin/tazobactam IVPB. 3.375 Gram(s) IV Intermittent every 8 hours      MEDICATIONS  (PRN):  acetaminophen   Tablet .. 650 milliGRAM(s) Oral every 6 hours PRN Temp greater or equal to 38C (100.4F)  dextrose 40% Gel 15 Gram(s) Oral once PRN Blood Glucose LESS THAN 70 milliGRAM(s)/deciLiter  glucagon  Injectable 1 milliGRAM(s) IntraMuscular once PRN Glucose <70 milliGRAM(s)/deciLiter  haloperidol    Injectable 1 milliGRAM(s) IV Push every 6 hours PRN Agitation  heparin  Injectable 3500 Unit(s) IV Push every 6 hours PRN For aPTT less than 40  heparin  Injectable 1500 Unit(s) IV Push every 6 hours PRN For aPTT between 40 - 57          Vital Signs Last 24 Hrs  T(C): 36.9 (18 Oct 2018 11:15), Max: 36.9 (18 Oct 2018 11:15)  T(F): 98.4 (18 Oct 2018 11:15), Max: 98.4 (18 Oct 2018 11:15)  HR: 79 (18 Oct 2018 11:15) (75 - 96)  BP: 127/78 (18 Oct 2018 11:15) (104/63 - 130/82)  BP(mean): --  RR: 20 (18 Oct 2018 11:15) (18 - 93)  SpO2: 93% (18 Oct 2018 11:15) (93% - 98%)    PHYSICAL EXAMINATION:    GENERAL: The patient is awake, alert and oriented in no apparent distress.      HEENT: Mucous membranes are moist.    NECK: Supple.    LUNGS: Bilateral breath sounds; respirations unlabored    HEART: S1 S2 without murmur.    ABDOMEN: Soft, nontender    EXTREMITIES: Without edema; nontender          LABS:                        13.9   17.9  )-----------( 310      ( 18 Oct 2018 10:11 )             39.3     10-18    125<L>  |  87<L>  |  37<H>  ----------------------------<  222<H>  4.2   |  19<L>  |  1.98<H>    Ca    8.3<L>      18 Oct 2018 10:05      PT/INR - ( 18 Oct 2018 10:12 )   PT: 13.0 sec;   INR: 1.19 ratio         PTT - ( 18 Oct 2018 10:12 )  PTT:106.4 sec    ABG - ( 16 Oct 2018 21:11 )  pH, Arterial: 7.48  pH, Blood: x     /  pCO2: 36    /  pO2: 107   / HCO3: 27    / Base Excess: 3.8   /  SaO2: 98              RADIOLOGY & ADDITIONAL STUDIES:      EXAM:  XR CHEST PORTABLE URGENT 1V                            PROCEDURE DATE:  10/16/2018            INTERPRETATION:  CLINICAL INFORMATION: Tachycardia.    TECHNIQUE: Single AP radiograph of the chest dated 10/16/2018.    COMPARISON: Chest radiograph dated 10/12/2018.    FINDINGS:  Interval removal of endotracheal tube, right-sided central venous   catheter, and enteric tube.  The lungs are clear.  No pleural effusions or pneumothorax.  Heart size is within normal limits.   Old left-sided rib fractures.    IMPRESSION: Clear lungs.                CECILIO ELIZABETH M.D., RADIOLOGY RESIDENT  This document has been electronically signed.  JULIA DAVIS M.D., ATTENDING RADIOLOGIST  This document has been electronically signed. Oct 17 2018 10:41AM

## 2018-10-18 NOTE — PROGRESS NOTE ADULT - PROBLEM SELECTOR PLAN 5
pulmonary help appreciated appear to have resolved  DVT study negative  will continue IV heparin  unable to give warfarin till cleared by speech and swallow eval

## 2018-10-18 NOTE — CONSULT NOTE ADULT - SUBJECTIVE AND OBJECTIVE BOX
[facility]  Jeremy Smith PA-C  [address] [place] [address]  [phone]      Chief Complaint:  Patient is a 73y old  Female who presents with a chief complaint of Fall, AMS (18 Oct 2018 17:13)      HPI: 73F who presented with ams and fall, was found to have respiratory distress requiring mechanical ventilation. subsequently extubated, patient remains lethargic and had a swallow eval which she failed.   she previously had a PEG which had been removed and prior to admission was eating by mouth  history obtained by family at bedside.     Allergies:  No Known Allergies      Medications:  acetaminophen   Tablet .. 650 milliGRAM(s) Oral every 6 hours PRN  ALBUTerol/ipratropium for Nebulization 3 milliLiter(s) Nebulizer every 6 hours  dextrose 40% Gel 15 Gram(s) Oral once PRN  dextrose 5%. 1000 milliLiter(s) IV Continuous <Continuous>  dextrose 50% Injectable 12.5 Gram(s) IV Push once  dextrose 50% Injectable 25 Gram(s) IV Push once  dextrose 50% Injectable 25 Gram(s) IV Push once  glucagon  Injectable 1 milliGRAM(s) IntraMuscular once PRN  haloperidol    Injectable 1 milliGRAM(s) IV Push every 6 hours PRN  heparin  Infusion. 700 Unit(s)/Hr IV Continuous <Continuous>  heparin  Injectable 3500 Unit(s) IV Push every 6 hours PRN  heparin  Injectable 1500 Unit(s) IV Push every 6 hours PRN  insulin lispro (HumaLOG) corrective regimen sliding scale   SubCutaneous every 6 hours  levETIRAcetam  IVPB 500 milliGRAM(s) IV Intermittent every 12 hours  methylPREDNISolone sodium succinate Injectable 20 milliGRAM(s) IV Push every 12 hours  metoprolol tartrate IVPB 10 milliGRAM(s) IV Intermittent every 6 hours  piperacillin/tazobactam IVPB. 3.375 Gram(s) IV Intermittent every 8 hours  sodium chloride 0.9%. 1000 milliLiter(s) IV Continuous <Continuous>      PMHX/PSHX:  DVT (deep venous thrombosis)  HLD (hyperlipidemia)  COPD (chronic obstructive pulmonary disease)  No significant past surgical history      Family history:  No pertinent family history      Social History:     ROS:     unable to obtain      PHYSICAL EXAM:   Vital Signs:  Vital Signs Last 24 Hrs  T(C): 36.7 (18 Oct 2018 19:21), Max: 36.9 (18 Oct 2018 11:15)  T(F): 98.1 (18 Oct 2018 19:21), Max: 98.5 (18 Oct 2018 16:31)  HR: 70 (18 Oct 2018 19:21) (70 - 94)  BP: 97/62 (18 Oct 2018 19:21) (97/62 - 138/72)  BP(mean): --  RR: 18 (18 Oct 2018 19:21) (18 - 93)  SpO2: 94% (18 Oct 2018 19:21) (93% - 97%)  Daily     Daily     nad   awake but lethargic  s1s2  soft nt  no edema    LABS:                        13.9   17.9  )-----------( 310      ( 18 Oct 2018 10:11 )             39.3     10-18    125<L>  |  87<L>  |  37<H>  ----------------------------<  222<H>  4.2   |  19<L>  |  1.98<H>    Ca    8.3<L>      18 Oct 2018 10:05        PT/INR - ( 18 Oct 2018 10:12 )   PT: 13.0 sec;   INR: 1.19 ratio         PTT - ( 18 Oct 2018 17:49 )  PTT:89.0 sec        Imaging:

## 2018-10-19 DIAGNOSIS — N28.9 DISORDER OF KIDNEY AND URETER, UNSPECIFIED: ICD-10-CM

## 2018-10-19 DIAGNOSIS — Z71.89 OTHER SPECIFIED COUNSELING: ICD-10-CM

## 2018-10-19 LAB
ANION GAP SERPL CALC-SCNC: 17 MMOL/L — SIGNIFICANT CHANGE UP (ref 5–17)
ANISOCYTOSIS BLD QL: SLIGHT — SIGNIFICANT CHANGE UP
APTT BLD: 40.8 SEC — HIGH (ref 27.5–37.4)
APTT BLD: 51.3 SEC — HIGH (ref 27.5–37.4)
APTT BLD: > 200 SEC (ref 27.5–37.4)
BASOPHILS # BLD AUTO: 0 K/UL — SIGNIFICANT CHANGE UP (ref 0–0.2)
BASOPHILS NFR BLD AUTO: 0 % — SIGNIFICANT CHANGE UP (ref 0–2)
BUN SERPL-MCNC: 46 MG/DL — HIGH (ref 7–23)
BURR CELLS BLD QL SMEAR: SIGNIFICANT CHANGE UP
CALCIUM SERPL-MCNC: 8.9 MG/DL — SIGNIFICANT CHANGE UP (ref 8.4–10.5)
CHLORIDE SERPL-SCNC: 91 MMOL/L — LOW (ref 96–108)
CO2 SERPL-SCNC: 20 MMOL/L — LOW (ref 22–31)
CREAT SERPL-MCNC: 2.18 MG/DL — HIGH (ref 0.5–1.3)
EOSINOPHIL # BLD AUTO: 0 K/UL — SIGNIFICANT CHANGE UP (ref 0–0.5)
EOSINOPHIL NFR BLD AUTO: 0 % — SIGNIFICANT CHANGE UP (ref 0–6)
GLUCOSE BLDC GLUCOMTR-MCNC: 107 MG/DL — HIGH (ref 70–99)
GLUCOSE BLDC GLUCOMTR-MCNC: 153 MG/DL — HIGH (ref 70–99)
GLUCOSE BLDC GLUCOMTR-MCNC: 178 MG/DL — HIGH (ref 70–99)
GLUCOSE BLDC GLUCOMTR-MCNC: 185 MG/DL — HIGH (ref 70–99)
GLUCOSE SERPL-MCNC: 170 MG/DL — HIGH (ref 70–99)
HCT VFR BLD CALC: 37.8 % — SIGNIFICANT CHANGE UP (ref 34.5–45)
HGB BLD-MCNC: 13 G/DL — SIGNIFICANT CHANGE UP (ref 11.5–15.5)
LYMPHOCYTES # BLD AUTO: 0.51 K/UL — LOW (ref 1–3.3)
LYMPHOCYTES # BLD AUTO: 2.6 % — LOW (ref 13–44)
MACROCYTES BLD QL: SLIGHT — SIGNIFICANT CHANGE UP
MANUAL SMEAR VERIFICATION: SIGNIFICANT CHANGE UP
MCHC RBC-ENTMCNC: 31.8 PG — SIGNIFICANT CHANGE UP (ref 27–34)
MCHC RBC-ENTMCNC: 34.4 GM/DL — SIGNIFICANT CHANGE UP (ref 32–36)
MCV RBC AUTO: 92.4 FL — SIGNIFICANT CHANGE UP (ref 80–100)
MONOCYTES # BLD AUTO: 0.84 K/UL — SIGNIFICANT CHANGE UP (ref 0–0.9)
MONOCYTES NFR BLD AUTO: 4.3 % — SIGNIFICANT CHANGE UP (ref 2–14)
MYELOCYTES NFR BLD: 0.9 % — HIGH (ref 0–0)
NEUTROPHILS # BLD AUTO: 17.91 K/UL — HIGH (ref 1.8–7.4)
NEUTROPHILS NFR BLD AUTO: 92.2 % — HIGH (ref 43–77)
PLAT MORPH BLD: NORMAL — SIGNIFICANT CHANGE UP
PLATELET # BLD AUTO: 323 K/UL — SIGNIFICANT CHANGE UP (ref 150–400)
POIKILOCYTOSIS BLD QL AUTO: SIGNIFICANT CHANGE UP
POTASSIUM SERPL-MCNC: 3.2 MMOL/L — LOW (ref 3.5–5.3)
POTASSIUM SERPL-SCNC: 3.2 MMOL/L — LOW (ref 3.5–5.3)
RBC # BLD: 4.09 M/UL — SIGNIFICANT CHANGE UP (ref 3.8–5.2)
RBC # FLD: 13 % — SIGNIFICANT CHANGE UP (ref 10.3–14.5)
RBC BLD AUTO: ABNORMAL
SODIUM SERPL-SCNC: 128 MMOL/L — LOW (ref 135–145)
WBC # BLD: 19.43 K/UL — HIGH (ref 3.8–10.5)
WBC # FLD AUTO: 19.43 K/UL — HIGH (ref 3.8–10.5)

## 2018-10-19 PROCEDURE — 99232 SBSQ HOSP IP/OBS MODERATE 35: CPT

## 2018-10-19 RX ORDER — DEXTROSE MONOHYDRATE, SODIUM CHLORIDE, AND POTASSIUM CHLORIDE 50; .745; 4.5 G/1000ML; G/1000ML; G/1000ML
1000 INJECTION, SOLUTION INTRAVENOUS
Qty: 0 | Refills: 0 | Status: DISCONTINUED | OUTPATIENT
Start: 2018-10-19 | End: 2018-10-22

## 2018-10-19 RX ORDER — NYSTATIN CREAM 100000 [USP'U]/G
1 CREAM TOPICAL EVERY 12 HOURS
Qty: 0 | Refills: 0 | Status: DISCONTINUED | OUTPATIENT
Start: 2018-10-19 | End: 2018-10-31

## 2018-10-19 RX ADMIN — Medication 3 MILLILITER(S): at 05:20

## 2018-10-19 RX ADMIN — Medication 3 MILLILITER(S): at 11:21

## 2018-10-19 RX ADMIN — Medication 120 MILLIGRAM(S): at 17:43

## 2018-10-19 RX ADMIN — PIPERACILLIN AND TAZOBACTAM 25 GRAM(S): 4; .5 INJECTION, POWDER, LYOPHILIZED, FOR SOLUTION INTRAVENOUS at 06:22

## 2018-10-19 RX ADMIN — Medication 120 MILLIGRAM(S): at 05:14

## 2018-10-19 RX ADMIN — Medication 1: at 00:33

## 2018-10-19 RX ADMIN — HEPARIN SODIUM 100 UNIT(S)/HR: 5000 INJECTION INTRAVENOUS; SUBCUTANEOUS at 08:59

## 2018-10-19 RX ADMIN — LEVETIRACETAM 400 MILLIGRAM(S): 250 TABLET, FILM COATED ORAL at 04:53

## 2018-10-19 RX ADMIN — NYSTATIN CREAM 1 APPLICATION(S): 100000 CREAM TOPICAL at 17:22

## 2018-10-19 RX ADMIN — Medication 1: at 07:02

## 2018-10-19 RX ADMIN — Medication 20 MILLIGRAM(S): at 02:08

## 2018-10-19 RX ADMIN — Medication 3 MILLILITER(S): at 00:33

## 2018-10-19 RX ADMIN — HEPARIN SODIUM 200 UNIT(S)/HR: 5000 INJECTION INTRAVENOUS; SUBCUTANEOUS at 17:18

## 2018-10-19 RX ADMIN — Medication 120 MILLIGRAM(S): at 01:08

## 2018-10-19 RX ADMIN — Medication 3 MILLILITER(S): at 17:22

## 2018-10-19 RX ADMIN — HEPARIN SODIUM 0 UNIT(S)/HR: 5000 INJECTION INTRAVENOUS; SUBCUTANEOUS at 01:55

## 2018-10-19 RX ADMIN — Medication 1: at 12:52

## 2018-10-19 RX ADMIN — LEVETIRACETAM 400 MILLIGRAM(S): 250 TABLET, FILM COATED ORAL at 17:22

## 2018-10-19 RX ADMIN — DEXTROSE MONOHYDRATE, SODIUM CHLORIDE, AND POTASSIUM CHLORIDE 50 MILLILITER(S): 50; .745; 4.5 INJECTION, SOLUTION INTRAVENOUS at 18:05

## 2018-10-19 NOTE — CHART NOTE - NSCHARTNOTEFT_GEN_A_CORE
Pt was seen and examined.  Briefly this is a elderly female c hx CVA Afib c seizure requiring mechanical intubation  GOPAL  Hyponatremia  Hypokalemia    Suggest  -Bladder scan now  -May need straight cath  -Lower IVF to 50cc and add potassium to the bag        Sayed Fer  [facility]  [phone]

## 2018-10-19 NOTE — PROGRESS NOTE ADULT - PROBLEM SELECTOR PLAN 5
s/p NGT placement; feedings at 40 ml per hour with significant improvement in patient's mentation/wakefulness

## 2018-10-19 NOTE — SWALLOW BEDSIDE ASSESSMENT ADULT - SWALLOW EVAL: PATIENT/FAMILY GOALS STATEMENT
Spouse reports pt received a regular texture diet PTA. Reports pt required assistance to cut food into pieces because of no use of L arm. Reports with thin liquid pt needs to take small sips and that if she gulps liquid she coughs. Reports pt needs reminders everyday to take small sips because she is otherwise forgetful. Spouse also notes coughing with food if pt did not chew enough. Denies h/o pnas. Reports pt is followed by an SLP 1x/week with goals focused on language tx.

## 2018-10-19 NOTE — SWALLOW BEDSIDE ASSESSMENT ADULT - SWALLOW EVAL: RECOMMENDED DIET
NPO, with non-oral nutrition/hydration/medications.
NPO, with non-oral nutrition/hydration/medications.
Suggest NPO, with non-oral nutrition/hydration/medications.
NPO, with non-oral nutrition/hydration/medications pending MBS

## 2018-10-19 NOTE — SWALLOW BEDSIDE ASSESSMENT ADULT - SWALLOW EVAL: ORAL MUSCULATURE
NC 3L, large areas of ecchymosis face and neck - denies pain/anomalies present
unable to assess due to poor participation/comprehension
anomalies present

## 2018-10-19 NOTE — PROGRESS NOTE ADULT - SUBJECTIVE AND OBJECTIVE BOX
Patient is a 73y old  Female who presents with a chief complaint of Fall, AMS (19 Oct 2018 09:31)    Being followed by ID for pna    Interval history:s/p NGT-for MBS  Some cough no sputum   No acute events      ROS:  No SOB,CP  No N/V/D./abd pain  No other complaints      Antimicrobials:  solumderol   None     Other medications reviewed    Vital Signs Last 24 Hrs  T(C): 37.1 (10-19-18 @ 09:25), Max: 37.1 (10-19-18 @ 09:25)  T(F): 98.8 (10-19-18 @ 09:25), Max: 98.8 (10-19-18 @ 09:25)  HR: 98 (10-19-18 @ 09:25) (70 - 101)  BP: 128/63 (10-19-18 @ 09:25) (97/62 - 138/72)  BP(mean): --  RR: 18 (10-19-18 @ 09:25) (18 - 20)  SpO2: 95% (10-19-18 @ 09:25) (93% - 95%)    Physical Exam:        HEENT PERRLA EOMI  NGT    No oral exudate or erythema    Chest Good AE,minimal bibasilar crackles     CVS RRR S1 S2 WNl No murmur or rub or gallop    Abd soft BS normal No tenderness no masses    IV site no erythema tenderness or discharge    CNS AAO X 3 no focal    Lab Data:                          13.0   19.43 )-----------( 323      ( 19 Oct 2018 09:12 )             37.8     WBC Count: 19.43 (10-19-18 @ 09:12)  WBC Count: 17.9 (10-18-18 @ 10:11)  WBC Count: 16.19 (10-17-18 @ 09:01)  WBC Count: 10.6 (10-16-18 @ 07:18)  WBC Count: 9.5 (10-15-18 @ 00:12)  WBC Count: 6.5 (10-14-18 @ 01:08)  WBC Count: 9.8 (10-13-18 @ 13:51)  WBC Count: 11.9 (10-13-18 @ 01:39)    10-19    128<L>  |  91<L>  |  46<H>  ----------------------------<  170<H>  3.2<L>   |  20<L>  |  2.18<H>    Ca    8.9      19 Oct 2018 07:14

## 2018-10-19 NOTE — SWALLOW BEDSIDE ASSESSMENT ADULT - SLP PERTINENT HISTORY OF CURRENT PROBLEM
REFER TO ADDENDUM FOR HISTORY/HOSPITAL COURSE.

## 2018-10-19 NOTE — SWALLOW BEDSIDE ASSESSMENT ADULT - SLP GENERAL OBSERVATIONS
Pt received in bed. +NGT. Grossly A&Ox3 (to self, to date and place with verbal cues). + Language deficits. Expressive language characterized by word finding deficits, paraphasias, limited verbal output. Receptive language characterized by inconsistent command following, deficits in auditory comprehension at times requiring repetition to improve accuracy of responses. Reduced vocal volume.

## 2018-10-19 NOTE — SWALLOW BEDSIDE ASSESSMENT ADULT - SWALLOW EVAL: DIAGNOSIS
Pt with significant improvement in alertness from previous assessment. Pt continues to present with an oral and suspected pharyngeal dysphagia characterized by reduced oral grading, impaired bolus formation, delayed oral transit time, delayed pharyngeal swallow, decreased laryngeal elevation, and multiple swallows post intake.

## 2018-10-19 NOTE — PROGRESS NOTE ADULT - ATTENDING COMMENTS
Case d/w Med NP  Monitor for s/s any other foci  ID will follow as needed,please call [phone] if any questions or issues.

## 2018-10-19 NOTE — PROGRESS NOTE ADULT - PROBLEM SELECTOR PLAN 2
npo   Tolerating NGT feeds  may consider repeat swallow next week when more alert  if she fails next week when more alert or if she is unable to take in her caloric needs orally she may need a PEG  d/w family

## 2018-10-19 NOTE — PROGRESS NOTE ADULT - PROBLEM SELECTOR PLAN 2
likely obstructive  Bladder scan 800 mls  straight cath as per hospital policy   monitor renal function

## 2018-10-19 NOTE — SWALLOW BEDSIDE ASSESSMENT ADULT - ADDITIONAL RECOMMENDATIONS
This service to f/u on 10/17. If MS improved will assess
- Will f/u on 10/18 to determine if status improved enough to proceed with an objective swallow study - at current time, would postpone
Maintain good oral hygiene.

## 2018-10-19 NOTE — SWALLOW BEDSIDE ASSESSMENT ADULT - ASR SWALLOW RECOMMEND DIAG
defer - too altered for exam
defer at present
MD, please write order for Modified Barium Swallow Study. Will schedule exam upon receipt of order in Radiology./VFSS/MBS

## 2018-10-19 NOTE — PROGRESS NOTE ADULT - SUBJECTIVE AND OBJECTIVE BOX
PULM/MED PROGRESS NOTE:    In bed, awake, totally alert       MEDICATIONS  (STANDING):  ALBUTerol/ipratropium for Nebulization 3 milliLiter(s) Nebulizer every 6 hours  dextrose 5%. 1000 milliLiter(s) (50 mL/Hr) IV Continuous <Continuous>  dextrose 50% Injectable 12.5 Gram(s) IV Push once  dextrose 50% Injectable 25 Gram(s) IV Push once  dextrose 50% Injectable 25 Gram(s) IV Push once  heparin  Infusion. 700 Unit(s)/Hr (7 mL/Hr) IV Continuous <Continuous>  insulin lispro (HumaLOG) corrective regimen sliding scale   SubCutaneous every 6 hours  levETIRAcetam  IVPB 500 milliGRAM(s) IV Intermittent every 12 hours  methylPREDNISolone sodium succinate Injectable 20 milliGRAM(s) IV Push every 12 hours  metoprolol tartrate IVPB 10 milliGRAM(s) IV Intermittent every 6 hours  sodium chloride 0.9%. 1000 milliLiter(s) (75 mL/Hr) IV Continuous <Continuous>      MEDICATIONS  (PRN):  acetaminophen   Tablet .. 650 milliGRAM(s) Oral every 6 hours PRN Temp greater or equal to 38C (100.4F)  dextrose 40% Gel 15 Gram(s) Oral once PRN Blood Glucose LESS THAN 70 milliGRAM(s)/deciLiter  glucagon  Injectable 1 milliGRAM(s) IntraMuscular once PRN Glucose <70 milliGRAM(s)/deciLiter  haloperidol    Injectable 1 milliGRAM(s) IV Push every 6 hours PRN Agitation  heparin  Injectable 3500 Unit(s) IV Push every 6 hours PRN For aPTT less than 40  heparin  Injectable 1500 Unit(s) IV Push every 6 hours PRN For aPTT between 40 - 57          Vital Signs Last 24 Hrs  T(C): 36.5 (19 Oct 2018 04:52), Max: 36.9 (18 Oct 2018 11:15)  T(F): 97.7 (19 Oct 2018 04:52), Max: 98.5 (18 Oct 2018 16:31)  HR: 101 (19 Oct 2018 04:52) (70 - 101)  BP: 124/74 (19 Oct 2018 04:52) (97/62 - 138/72)  BP(mean): --  RR: 18 (19 Oct 2018 04:52) (18 - 93)  SpO2: 94% (19 Oct 2018 04:52) (93% - 94%)    PHYSICAL EXAMINATION:    GENERAL: The patient is awake and alert in no apparent distress.     HEENT: left facial/head ecchymoses improving    NECK: Supple.    LUNGS: Bilateral breath sounds; respirations unlabored    HEART: S1 S2 reg    ABDOMEN: Soft, nontender    EXTREMITIES: Without  edema.       LABS:                        13.9   17.9  )-----------( 310      ( 18 Oct 2018 10:11 )             39.3     10-19    128<L>  |  91<L>  |  46<H>  ----------------------------<  170<H>  3.2<L>   |  20<L>  |  2.18<H>    Ca    8.9      19 Oct 2018 07:14      PT/INR - ( 18 Oct 2018 10:12 )   PT: 13.0 sec;   INR: 1.19 ratio         PTT - ( 19 Oct 2018 07:14 )  PTT:51.3 sec      RADIOLOGY & ADDITIONAL STUDIES:  CXR for for feeding tube placement done

## 2018-10-19 NOTE — SWALLOW BEDSIDE ASSESSMENT ADULT - SWALLOW EVAL: ANTICIPATED DISCHARGE DISPOSITION
pt's  wants d/c to home and home based tx
family wants home based tx upon d/c NO rehab placement
TBD

## 2018-10-19 NOTE — PROGRESS NOTE ADULT - SUBJECTIVE AND OBJECTIVE BOX
Interval Events: Pt seen and examined. As per RN she is tolerating NGT feeds, no nausea/vomiting.   Pt is more alert today.    MEDICATIONS  (STANDING):  ALBUTerol/ipratropium for Nebulization 3 milliLiter(s) Nebulizer every 6 hours  dextrose 5%. 1000 milliLiter(s) (50 mL/Hr) IV Continuous <Continuous>  dextrose 50% Injectable 12.5 Gram(s) IV Push once  dextrose 50% Injectable 25 Gram(s) IV Push once  dextrose 50% Injectable 25 Gram(s) IV Push once  heparin  Infusion. 700 Unit(s)/Hr (7 mL/Hr) IV Continuous <Continuous>  insulin lispro (HumaLOG) corrective regimen sliding scale   SubCutaneous every 6 hours  levETIRAcetam  IVPB 500 milliGRAM(s) IV Intermittent every 12 hours  methylPREDNISolone sodium succinate Injectable 20 milliGRAM(s) IV Push every 12 hours  metoprolol tartrate IVPB 10 milliGRAM(s) IV Intermittent every 6 hours  nystatin Powder 1 Application(s) Topical every 12 hours  sodium chloride 0.9% with potassium chloride 20 mEq/L 1000 milliLiter(s) (50 mL/Hr) IV Continuous <Continuous>    MEDICATIONS  (PRN):  acetaminophen   Tablet .. 650 milliGRAM(s) Oral every 6 hours PRN Temp greater or equal to 38C (100.4F)  dextrose 40% Gel 15 Gram(s) Oral once PRN Blood Glucose LESS THAN 70 milliGRAM(s)/deciLiter  glucagon  Injectable 1 milliGRAM(s) IntraMuscular once PRN Glucose <70 milliGRAM(s)/deciLiter  haloperidol    Injectable 1 milliGRAM(s) IV Push every 6 hours PRN Agitation  heparin  Injectable 3500 Unit(s) IV Push every 6 hours PRN For aPTT less than 40  heparin  Injectable 1500 Unit(s) IV Push every 6 hours PRN For aPTT between 40 - 57      Allergies    No Known Allergies    Intolerances        Review of Systems:    General:  No wt loss, fevers, chills, night sweats,fatigue,   Eyes:  Good vision, no reported pain  ENT:  No sore throat, pain, runny nose, dysphagia  CV:  No pain, palpitations, hypo/hypertension  Resp:  No dyspnea, cough, tachypnea, wheezing  GI:  No pain, No nausea, No vomiting, No diarrhea, No constipation, No weight loss, No fever, No pruritis, No rectal bleeding, No melena, No dysphagia  :  No pain, bleeding, incontinence, nocturia  Muscle:  No pain, weakness  Neuro:  No weakness, tingling, memory problems  Psych:  No fatigue, insomnia, mood problems, depression  Endocrine:  No polyuria, polydypsia, cold/heat intolerance  Heme:  No petechiae, ecchymosis, easy bruisability  Skin:  No rash, tattoos, scars, edema      Vital Signs Last 24 Hrs  T(C): 37.1 (19 Oct 2018 09:25), Max: 37.1 (19 Oct 2018 09:25)  T(F): 98.8 (19 Oct 2018 09:25), Max: 98.8 (19 Oct 2018 09:25)  HR: 98 (19 Oct 2018 09:25) (70 - 101)  BP: 128/63 (19 Oct 2018 09:25) (97/62 - 128/63)  BP(mean): --  RR: 18 (19 Oct 2018 09:25) (18 - 18)  SpO2: 95% (19 Oct 2018 09:25) (93% - 95%)    PHYSICAL EXAM:    Constitutional: NAD, well-developed  HEENT: EOMI, throat clear, + NGT  Neck: No LAD, supple  Respiratory: CTA and P  Cardiovascular: S1 and S2, RRR, no M  Gastrointestinal: BS+, soft, NT/ND, neg HSM,  Extremities: No peripheral edema, neg clubing, cyanosis  Vascular: 2+ peripheral pulses  Neurological: awake, alert  Psychiatric: Normal mood, normal affect  Skin: No rashes      LABS:                        13.0   19.43 )-----------( 323      ( 19 Oct 2018 09:12 )             37.8     10-19    128<L>  |  91<L>  |  46<H>  ----------------------------<  170<H>  3.2<L>   |  20<L>  |  2.18<H>    Ca    8.9      19 Oct 2018 07:14      PT/INR - ( 18 Oct 2018 10:12 )   PT: 13.0 sec;   INR: 1.19 ratio         PTT - ( 19 Oct 2018 07:14 )  PTT:51.3 sec      RADIOLOGY & ADDITIONAL TESTS:

## 2018-10-20 LAB
ANION GAP SERPL CALC-SCNC: 14 MMOL/L — SIGNIFICANT CHANGE UP (ref 5–17)
APTT BLD: 119.5 SEC — HIGH (ref 27.5–37.4)
APTT BLD: 76.7 SEC — HIGH (ref 27.5–37.4)
APTT BLD: 87.6 SEC — HIGH (ref 27.5–37.4)
BUN SERPL-MCNC: 32 MG/DL — HIGH (ref 7–23)
CALCIUM SERPL-MCNC: 8.4 MG/DL — SIGNIFICANT CHANGE UP (ref 8.4–10.5)
CHLORIDE SERPL-SCNC: 108 MMOL/L — SIGNIFICANT CHANGE UP (ref 96–108)
CO2 SERPL-SCNC: 21 MMOL/L — LOW (ref 22–31)
CREAT SERPL-MCNC: 0.98 MG/DL — SIGNIFICANT CHANGE UP (ref 0.5–1.3)
GLUCOSE BLDC GLUCOMTR-MCNC: 119 MG/DL — HIGH (ref 70–99)
GLUCOSE BLDC GLUCOMTR-MCNC: 123 MG/DL — HIGH (ref 70–99)
GLUCOSE BLDC GLUCOMTR-MCNC: 162 MG/DL — HIGH (ref 70–99)
GLUCOSE BLDC GLUCOMTR-MCNC: 180 MG/DL — HIGH (ref 70–99)
GLUCOSE BLDC GLUCOMTR-MCNC: 96 MG/DL — SIGNIFICANT CHANGE UP (ref 70–99)
GLUCOSE SERPL-MCNC: 117 MG/DL — HIGH (ref 70–99)
HCT VFR BLD CALC: 33.3 % — LOW (ref 34.5–45)
HGB BLD-MCNC: 11.1 G/DL — LOW (ref 11.5–15.5)
MCHC RBC-ENTMCNC: 31.9 PG — SIGNIFICANT CHANGE UP (ref 27–34)
MCHC RBC-ENTMCNC: 33.4 GM/DL — SIGNIFICANT CHANGE UP (ref 32–36)
MCV RBC AUTO: 95.3 FL — SIGNIFICANT CHANGE UP (ref 80–100)
PLATELET # BLD AUTO: 263 K/UL — SIGNIFICANT CHANGE UP (ref 150–400)
POTASSIUM SERPL-MCNC: 3 MMOL/L — LOW (ref 3.5–5.3)
POTASSIUM SERPL-SCNC: 3 MMOL/L — LOW (ref 3.5–5.3)
RBC # BLD: 3.5 M/UL — LOW (ref 3.8–5.2)
RBC # FLD: 12.7 % — SIGNIFICANT CHANGE UP (ref 10.3–14.5)
SODIUM SERPL-SCNC: 143 MMOL/L — SIGNIFICANT CHANGE UP (ref 135–145)
WBC # BLD: 13.2 K/UL — HIGH (ref 3.8–10.5)
WBC # FLD AUTO: 13.2 K/UL — HIGH (ref 3.8–10.5)

## 2018-10-20 PROCEDURE — 74230 X-RAY XM SWLNG FUNCJ C+: CPT | Mod: 26

## 2018-10-20 RX ORDER — HEPARIN SODIUM 5000 [USP'U]/ML
1500 INJECTION INTRAVENOUS; SUBCUTANEOUS EVERY 6 HOURS
Qty: 0 | Refills: 0 | Status: DISCONTINUED | OUTPATIENT
Start: 2018-10-20 | End: 2018-10-23

## 2018-10-20 RX ORDER — WARFARIN SODIUM 2.5 MG/1
2.5 TABLET ORAL ONCE
Qty: 0 | Refills: 0 | Status: COMPLETED | OUTPATIENT
Start: 2018-10-20 | End: 2018-10-20

## 2018-10-20 RX ORDER — HEPARIN SODIUM 5000 [USP'U]/ML
3500 INJECTION INTRAVENOUS; SUBCUTANEOUS EVERY 6 HOURS
Qty: 0 | Refills: 0 | Status: DISCONTINUED | OUTPATIENT
Start: 2018-10-20 | End: 2018-10-23

## 2018-10-20 RX ORDER — HEPARIN SODIUM 5000 [USP'U]/ML
700 INJECTION INTRAVENOUS; SUBCUTANEOUS
Qty: 25000 | Refills: 0 | Status: DISCONTINUED | OUTPATIENT
Start: 2018-10-20 | End: 2018-10-23

## 2018-10-20 RX ORDER — POTASSIUM CHLORIDE 20 MEQ
40 PACKET (EA) ORAL ONCE
Qty: 0 | Refills: 0 | Status: COMPLETED | OUTPATIENT
Start: 2018-10-20 | End: 2018-10-20

## 2018-10-20 RX ADMIN — HEPARIN SODIUM 700 UNIT(S)/HR: 5000 INJECTION INTRAVENOUS; SUBCUTANEOUS at 07:39

## 2018-10-20 RX ADMIN — NYSTATIN CREAM 1 APPLICATION(S): 100000 CREAM TOPICAL at 05:10

## 2018-10-20 RX ADMIN — LEVETIRACETAM 400 MILLIGRAM(S): 250 TABLET, FILM COATED ORAL at 18:25

## 2018-10-20 RX ADMIN — Medication 1: at 18:18

## 2018-10-20 RX ADMIN — Medication 3 MILLILITER(S): at 05:10

## 2018-10-20 RX ADMIN — Medication 120 MILLIGRAM(S): at 18:16

## 2018-10-20 RX ADMIN — DEXTROSE MONOHYDRATE, SODIUM CHLORIDE, AND POTASSIUM CHLORIDE 50 MILLILITER(S): 50; .745; 4.5 INJECTION, SOLUTION INTRAVENOUS at 15:18

## 2018-10-20 RX ADMIN — Medication 3 MILLILITER(S): at 18:22

## 2018-10-20 RX ADMIN — LEVETIRACETAM 400 MILLIGRAM(S): 250 TABLET, FILM COATED ORAL at 04:49

## 2018-10-20 RX ADMIN — Medication 40 MILLIEQUIVALENT(S): at 13:18

## 2018-10-20 RX ADMIN — HEPARIN SODIUM 700 UNIT(S)/HR: 5000 INJECTION INTRAVENOUS; SUBCUTANEOUS at 15:18

## 2018-10-20 RX ADMIN — Medication 120 MILLIGRAM(S): at 00:33

## 2018-10-20 RX ADMIN — Medication 3 MILLILITER(S): at 13:12

## 2018-10-20 RX ADMIN — Medication 120 MILLIGRAM(S): at 13:15

## 2018-10-20 RX ADMIN — HEPARIN SODIUM 700 UNIT(S)/HR: 5000 INJECTION INTRAVENOUS; SUBCUTANEOUS at 00:36

## 2018-10-20 RX ADMIN — Medication 3 MILLILITER(S): at 00:32

## 2018-10-20 RX ADMIN — NYSTATIN CREAM 1 APPLICATION(S): 100000 CREAM TOPICAL at 18:19

## 2018-10-20 RX ADMIN — WARFARIN SODIUM 2.5 MILLIGRAM(S): 2.5 TABLET ORAL at 21:01

## 2018-10-20 NOTE — SWALLOW VFSS/MBS ASSESSMENT ADULT - RECOMMENDED CONSISTENCY
Dysphagia 2 with Nectar thickened liquids.    MD/team Please enter the following as notes to RN: 1) Assist with meals, 2) Crush meds or provide via alternate source, 3) Provide SMALL, single sips and bites at slow rate 4) Encourage repeat swallows to aid in oropharyngeal clearance 5) Alternate food and liquids to aid in oral and pharyngeal clearance 6) No straws 7) Maintain upright posture during oral intake 8) Aspiration precautions. Monitor for s/s aspiration/laryngeal penetration. If noted:  D/C p.o. intake, provide non-oral nutrition/hydration/meds

## 2018-10-20 NOTE — SWALLOW VFSS/MBS ASSESSMENT ADULT - ROSENBEK'S PENETRATION ASPIRATION SCALE
(1) no aspiration, contrast does not enter airway (7) contrast passes glottis, visible subglottic residue remains despite patient’s response (aspiration)

## 2018-10-20 NOTE — SWALLOW VFSS/MBS ASSESSMENT ADULT - ORAL PHASE
episodic spillover of piecemealed bolus to the level of the valleculae mild spillover to the level of the valleculae mild-moderate spillover to the level of the valleculae; +trace-mild spillover of residue vs piecemealed bolus to the level of the pyriform sinuses mild spillover to the level of the pyriform sinuses +trace-mild oral residue greatest with hard solids

## 2018-10-20 NOTE — PROGRESS NOTE ADULT - SUBJECTIVE AND OBJECTIVE BOX
Patient is a 73y old  Female who presents with a chief complaint of Fall, AMS (20 Oct 2018 11:35)    SUBJECTIVE / OVERNIGHT EVENTS: no overnight events    ROS:  Resp: No cough no sputum production  CVS: No chest pain no palpitations no orthopnea  GI: no N/V/D  : no dysuria, no hematuria  Neuro: no weakness no paresthesias  Heme: No petechiae no easy bruising  Msk: No joint pain no swelling  Skin: No rash no itching        MEDICATIONS  (STANDING):  ALBUTerol/ipratropium for Nebulization 3 milliLiter(s) Nebulizer every 6 hours  dextrose 5%. 1000 milliLiter(s) (50 mL/Hr) IV Continuous <Continuous>  dextrose 50% Injectable 12.5 Gram(s) IV Push once  dextrose 50% Injectable 25 Gram(s) IV Push once  dextrose 50% Injectable 25 Gram(s) IV Push once  heparin  Infusion. 700 Unit(s)/Hr (7 mL/Hr) IV Continuous <Continuous>  insulin lispro (HumaLOG) corrective regimen sliding scale   SubCutaneous every 6 hours  levETIRAcetam  IVPB 500 milliGRAM(s) IV Intermittent every 12 hours  metoprolol tartrate IVPB 10 milliGRAM(s) IV Intermittent every 6 hours  nystatin Powder 1 Application(s) Topical every 12 hours  sodium chloride 0.9% with potassium chloride 20 mEq/L 1000 milliLiter(s) (50 mL/Hr) IV Continuous <Continuous>    MEDICATIONS  (PRN):  acetaminophen   Tablet .. 650 milliGRAM(s) Oral every 6 hours PRN Temp greater or equal to 38C (100.4F)  dextrose 40% Gel 15 Gram(s) Oral once PRN Blood Glucose LESS THAN 70 milliGRAM(s)/deciLiter  glucagon  Injectable 1 milliGRAM(s) IntraMuscular once PRN Glucose <70 milliGRAM(s)/deciLiter  haloperidol    Injectable 1 milliGRAM(s) IV Push every 6 hours PRN Agitation  heparin  Injectable 3500 Unit(s) IV Push every 6 hours PRN For aPTT less than 40  heparin  Injectable 1500 Unit(s) IV Push every 6 hours PRN For aPTT between 40 - 57        CAPILLARY BLOOD GLUCOSE      POCT Blood Glucose.: 123 mg/dL (20 Oct 2018 12:22)  POCT Blood Glucose.: 96 mg/dL (20 Oct 2018 06:04)  POCT Blood Glucose.: 119 mg/dL (20 Oct 2018 00:17)  POCT Blood Glucose.: 107 mg/dL (19 Oct 2018 17:20)    I&O's Summary    19 Oct 2018 07:01  -  20 Oct 2018 07:00  --------------------------------------------------------  IN: 1892 mL / OUT: 1050 mL / NET: 842 mL        Vital Signs Last 24 Hrs  T(C): 36.9 (20 Oct 2018 12:32), Max: 37.3 (20 Oct 2018 04:40)  T(F): 98.4 (20 Oct 2018 12:32), Max: 99.2 (20 Oct 2018 04:40)  HR: 104 (20 Oct 2018 12:32) (70 - 104)  BP: 131/60 (20 Oct 2018 12:32) (108/67 - 132/72)  BP(mean): --  RR: 18 (20 Oct 2018 12:32) (18 - 20)  SpO2: 97% (20 Oct 2018 12:32) (96% - 97%)    PHYSICAL EXAM:  GENERAL: NAD, asthenic  HEAD:  left facial hematoma, Normocephalic  NGT in place   EYES: EOMI, PERRLA, conjunctiva and sclera clear  NECK: Supple, No JVD  CHEST/LUNG: no rhonchi, no expiratory wheeze  HEART: S1 S2; No rubs or gallops, no murmurs appreciated  ABDOMEN: Soft, Nontender, Nondistended; Bowel sounds present  EXTREMITIES:  No clubbing or cyanosis, + Peripheral Pulses,  no edema  PSYCH: Alert, fully arousable   NEUROLOGY: left UE contracted since CVA  left leg 2 - 3/5 power  SKIN: No rashes or lesions    LABS:                        11.1   13.2  )-----------( 263      ( 20 Oct 2018 07:06 )             33.3     10-20    143  |  108  |  32<H>  ----------------------------<  117<H>  3.0<L>   |  21<L>  |  0.98    Ca    8.4      20 Oct 2018 07:06      PTT - ( 20 Oct 2018 07:06 )  PTT:87.6 sec            All consultant(s) notes reviewed and care discussed with other providers    Contact Number, Dr Styles [phone]

## 2018-10-20 NOTE — PROGRESS NOTE ADULT - SUBJECTIVE AND OBJECTIVE BOX
Patient seen and examined. No pain, no SOB.       MEDICATIONS  (STANDING):  ALBUTerol/ipratropium for Nebulization 3 milliLiter(s) Nebulizer every 6 hours  dextrose 5%. 1000 milliLiter(s) (50 mL/Hr) IV Continuous <Continuous>  dextrose 50% Injectable 12.5 Gram(s) IV Push once  dextrose 50% Injectable 25 Gram(s) IV Push once  dextrose 50% Injectable 25 Gram(s) IV Push once  heparin  Infusion. 700 Unit(s)/Hr (7 mL/Hr) IV Continuous <Continuous>  insulin lispro (HumaLOG) corrective regimen sliding scale   SubCutaneous every 6 hours  levETIRAcetam  IVPB 500 milliGRAM(s) IV Intermittent every 12 hours  metoprolol tartrate IVPB 10 milliGRAM(s) IV Intermittent every 6 hours  nystatin Powder 1 Application(s) Topical every 12 hours  sodium chloride 0.9% with potassium chloride 20 mEq/L 1000 milliLiter(s) (50 mL/Hr) IV Continuous <Continuous>  warfarin 2.5 milliGRAM(s) Oral once      VITAL:  T(C): , Max: 37.3 (10-20-18 @ 04:40)  T(F): , Max: 99.2 (10-20-18 @ 04:40)  HR: 114 (10-20-18 @ 17:10)  BP: 145/83 (10-20-18 @ 17:10)  RR: 18 (10-20-18 @ 17:10)  SpO2: 100% (10-20-18 @ 17:10)    I and O's:    10-19 @ 07:01  -  10-20 @ 07:00  --------------------------------------------------------  IN: 1892 mL / OUT: 1050 mL / NET: 842 mL        PHYSICAL EXAM:    Constitutional: NAD  Neck:  No JVD  Respiratory: CTAB/L  Cardiovascular: S1 and S2  Gastrointestinal: BS+, soft, NT/ND  Extremities: No peripheral edema  Neurological: A/O x 3, no focal deficits  Psychiatric: Normal mood, normal affect  : No Soria  Skin: No rashes    LABS:                        11.1   13.2  )-----------( 263      ( 20 Oct 2018 07:06 )             33.3     10-20    143  |  108  |  32<H>  ----------------------------<  117<H>  3.0<L>   |  21<L>  |  0.98    Ca    8.4      20 Oct 2018 07:06      ASSESSMENT:    73-year-old female with past medical history of hypertension, atrial fibrillation, status post cerebrovascular accident now presents with acute kidney injury.  On physical exam, she did have distended lower belly and I suspect she has post obstruction.  She has had episodes of hypotension during the hospitalization, but presently no change in her serum creatinine at that time.    1.  - soria catheter in place for urinary retention   2. Hyponatremia secondary to acute renal failure - improved  3. Hypokalemia - IVF have Potassium, s/p repletion of 40meq KCl PO x1 dose today    PLAN:  1. BMP daily  2. Continue IVF with KCl      CHELSY Raymundo  [facility]  [phone]

## 2018-10-20 NOTE — SWALLOW VFSS/MBS ASSESSMENT ADULT - DELAYED INITIATION OF THE PHARYNGEAL SWALLOW (IN SECONDS)
primary swallow timely, 1.8s delay with secondary swallow primary swallow timely, up to 4.4s delay with secondary swallow

## 2018-10-20 NOTE — SWALLOW VFSS/MBS ASSESSMENT ADULT - ORAL PHASE COMMENTS
+piecemeal deglutition  +trace-mild oral residue +piecemeal deglutition >45s oral transit time with hard solids  +piecemeal deglutition

## 2018-10-20 NOTE — PROGRESS NOTE ADULT - ATTENDING COMMENTS
will start warfarin tonight  discussed with patient in detail, all questions answered  discussed with family at bedside in detail (daughter) replete hypokalemia  will start warfarin tonight  discussed with patient in detail, all questions answered  discussed with family at bedside in detail (daughter)

## 2018-10-20 NOTE — SWALLOW VFSS/MBS ASSESSMENT ADULT - DIAGNOSTIC IMPRESSIONS
Patient presents with oropharyngeal dysphagia characterized primarily by prolonged oral transfer, impaired anterior-posterior transport with oral residue, premature bolus spillover, and aspiration with thin liquids without retrieval. Spontaneous cough was ineffective in clearing material from airway. Unable to fully assess efficacy of chin tuck posture given pt with difficulty implementing postural strategies, ? 2/2 receptive language deficits.

## 2018-10-20 NOTE — PROGRESS NOTE ADULT - SUBJECTIVE AND OBJECTIVE BOX
Interval Events: Pt seen with family at bedside. She pulled out NGT. Currently NPO.  No acute overnight events.    MEDICATIONS  (STANDING):  ALBUTerol/ipratropium for Nebulization 3 milliLiter(s) Nebulizer every 6 hours  dextrose 5%. 1000 milliLiter(s) (50 mL/Hr) IV Continuous <Continuous>  dextrose 50% Injectable 12.5 Gram(s) IV Push once  dextrose 50% Injectable 25 Gram(s) IV Push once  dextrose 50% Injectable 25 Gram(s) IV Push once  heparin  Infusion. 700 Unit(s)/Hr (7 mL/Hr) IV Continuous <Continuous>  insulin lispro (HumaLOG) corrective regimen sliding scale   SubCutaneous every 6 hours  levETIRAcetam  IVPB 500 milliGRAM(s) IV Intermittent every 12 hours  metoprolol tartrate IVPB 10 milliGRAM(s) IV Intermittent every 6 hours  nystatin Powder 1 Application(s) Topical every 12 hours  sodium chloride 0.9% with potassium chloride 20 mEq/L 1000 milliLiter(s) (50 mL/Hr) IV Continuous <Continuous>    MEDICATIONS  (PRN):  acetaminophen   Tablet .. 650 milliGRAM(s) Oral every 6 hours PRN Temp greater or equal to 38C (100.4F)  dextrose 40% Gel 15 Gram(s) Oral once PRN Blood Glucose LESS THAN 70 milliGRAM(s)/deciLiter  glucagon  Injectable 1 milliGRAM(s) IntraMuscular once PRN Glucose <70 milliGRAM(s)/deciLiter  haloperidol    Injectable 1 milliGRAM(s) IV Push every 6 hours PRN Agitation  heparin  Injectable 3500 Unit(s) IV Push every 6 hours PRN For aPTT less than 40  heparin  Injectable 1500 Unit(s) IV Push every 6 hours PRN For aPTT between 40 - 57      Allergies    No Known Allergies    Intolerances        Review of Systems:    General:  No wt loss, fevers, chills, night sweats,fatigue,   Eyes:  Good vision, no reported pain  ENT:  No sore throat, pain, runny nose, dysphagia  CV:  No pain, palpitations, hypo/hypertension  Resp:  No dyspnea, cough, tachypnea, wheezing  GI:  No pain, No nausea, No vomiting, No diarrhea, No constipation, No weight loss, No fever, No pruritis, No rectal bleeding, No melena, No dysphagia  :  No pain, bleeding, incontinence, nocturia  Muscle:  No pain, weakness  Neuro:  No weakness, tingling, memory problems  Psych:  No fatigue, insomnia, mood problems, depression  Endocrine:  No polyuria, polydypsia, cold/heat intolerance  Heme:  No petechiae, ecchymosis, easy bruisability  Skin:  No rash, tattoos, scars, edema      Vital Signs Last 24 Hrs  T(C): 37.3 (20 Oct 2018 04:40), Max: 37.3 (20 Oct 2018 04:40)  T(F): 99.2 (20 Oct 2018 04:40), Max: 99.2 (20 Oct 2018 04:40)  HR: 98 (20 Oct 2018 04:40) (70 - 98)  BP: 108/67 (20 Oct 2018 04:40) (108/67 - 132/72)  BP(mean): --  RR: 18 (20 Oct 2018 04:40) (18 - 20)  SpO2: 96% (20 Oct 2018 04:40) (96% - 97%)    PHYSICAL EXAM:    Constitutional: NAD, well-developed  HEENT: EOMI, throat clear  Neck: No LAD, supple  Respiratory: CTA and P  Cardiovascular: S1 and S2, RRR, no M  Gastrointestinal: BS+, soft, NT/ND, neg HSM,  Extremities: No peripheral edema, neg clubing, cyanosis  Vascular: 2+ peripheral pulses  Neurological: alert  Psychiatric: Normal mood, normal affect  Skin: No rashes      LABS:                        11.1   13.2  )-----------( 263      ( 20 Oct 2018 07:06 )             33.3     10-20    143  |  108  |  32<H>  ----------------------------<  117<H>  3.0<L>   |  21<L>  |  0.98    Ca    8.4      20 Oct 2018 07:06      PTT - ( 20 Oct 2018 07:06 )  PTT:87.6 sec      RADIOLOGY & ADDITIONAL TESTS:

## 2018-10-20 NOTE — SWALLOW VFSS/MBS ASSESSMENT ADULT - ADDITIONAL INFORMATION
+cervical osteophytes  +reversal of normal cervical lordosis     Patient's shoulder girdle obstructing view of proximal trachea.

## 2018-10-20 NOTE — SWALLOW VFSS/MBS ASSESSMENT ADULT - PHARYNGEAL PHASE COMMENTS
single trial administered in a chin tuck posture; which appeared to reduce depth and severity of laryngeal penetration, however unable to fully assess efficacy given pt unable to accurately implement strategy despite provision of PROM

## 2018-10-21 DIAGNOSIS — I10 ESSENTIAL (PRIMARY) HYPERTENSION: ICD-10-CM

## 2018-10-21 LAB
ANION GAP SERPL CALC-SCNC: 13 MMOL/L — SIGNIFICANT CHANGE UP (ref 5–17)
APTT BLD: 82 SEC — HIGH (ref 27.5–37.4)
BUN SERPL-MCNC: 14 MG/DL — SIGNIFICANT CHANGE UP (ref 7–23)
CALCIUM SERPL-MCNC: 8.3 MG/DL — LOW (ref 8.4–10.5)
CHLORIDE SERPL-SCNC: 109 MMOL/L — HIGH (ref 96–108)
CO2 SERPL-SCNC: 22 MMOL/L — SIGNIFICANT CHANGE UP (ref 22–31)
CREAT SERPL-MCNC: 0.61 MG/DL — SIGNIFICANT CHANGE UP (ref 0.5–1.3)
GLUCOSE BLDC GLUCOMTR-MCNC: 141 MG/DL — HIGH (ref 70–99)
GLUCOSE BLDC GLUCOMTR-MCNC: 142 MG/DL — HIGH (ref 70–99)
GLUCOSE BLDC GLUCOMTR-MCNC: 149 MG/DL — HIGH (ref 70–99)
GLUCOSE BLDC GLUCOMTR-MCNC: 170 MG/DL — HIGH (ref 70–99)
GLUCOSE SERPL-MCNC: 165 MG/DL — HIGH (ref 70–99)
HCT VFR BLD CALC: 28.9 % — LOW (ref 34.5–45)
HGB BLD-MCNC: 9.6 G/DL — LOW (ref 11.5–15.5)
INR BLD: 1.24 RATIO — HIGH (ref 0.88–1.16)
MCHC RBC-ENTMCNC: 31.9 PG — SIGNIFICANT CHANGE UP (ref 27–34)
MCHC RBC-ENTMCNC: 33.2 GM/DL — SIGNIFICANT CHANGE UP (ref 32–36)
MCV RBC AUTO: 96 FL — SIGNIFICANT CHANGE UP (ref 80–100)
PLATELET # BLD AUTO: 273 K/UL — SIGNIFICANT CHANGE UP (ref 150–400)
POTASSIUM SERPL-MCNC: 3.5 MMOL/L — SIGNIFICANT CHANGE UP (ref 3.5–5.3)
POTASSIUM SERPL-SCNC: 3.5 MMOL/L — SIGNIFICANT CHANGE UP (ref 3.5–5.3)
PROTHROM AB SERPL-ACNC: 14.1 SEC — HIGH (ref 10–13.1)
RBC # BLD: 3.01 M/UL — LOW (ref 3.8–5.2)
RBC # FLD: 13.6 % — SIGNIFICANT CHANGE UP (ref 10.3–14.5)
SODIUM SERPL-SCNC: 144 MMOL/L — SIGNIFICANT CHANGE UP (ref 135–145)
WBC # BLD: 17.35 K/UL — HIGH (ref 3.8–10.5)
WBC # FLD AUTO: 17.35 K/UL — HIGH (ref 3.8–10.5)

## 2018-10-21 RX ORDER — WARFARIN SODIUM 2.5 MG/1
2.5 TABLET ORAL ONCE
Qty: 0 | Refills: 0 | Status: COMPLETED | OUTPATIENT
Start: 2018-10-21 | End: 2018-10-21

## 2018-10-21 RX ORDER — METOPROLOL TARTRATE 50 MG
50 TABLET ORAL
Qty: 0 | Refills: 0 | Status: DISCONTINUED | OUTPATIENT
Start: 2018-10-21 | End: 2018-10-24

## 2018-10-21 RX ORDER — LEVETIRACETAM 250 MG/1
500 TABLET, FILM COATED ORAL
Qty: 0 | Refills: 0 | Status: DISCONTINUED | OUTPATIENT
Start: 2018-10-21 | End: 2018-10-31

## 2018-10-21 RX ADMIN — DEXTROSE MONOHYDRATE, SODIUM CHLORIDE, AND POTASSIUM CHLORIDE 50 MILLILITER(S): 50; .745; 4.5 INJECTION, SOLUTION INTRAVENOUS at 18:46

## 2018-10-21 RX ADMIN — WARFARIN SODIUM 2.5 MILLIGRAM(S): 2.5 TABLET ORAL at 21:18

## 2018-10-21 RX ADMIN — LEVETIRACETAM 400 MILLIGRAM(S): 250 TABLET, FILM COATED ORAL at 05:01

## 2018-10-21 RX ADMIN — NYSTATIN CREAM 1 APPLICATION(S): 100000 CREAM TOPICAL at 18:49

## 2018-10-21 RX ADMIN — Medication 3 MILLILITER(S): at 18:46

## 2018-10-21 RX ADMIN — Medication 120 MILLIGRAM(S): at 00:52

## 2018-10-21 RX ADMIN — LEVETIRACETAM 500 MILLIGRAM(S): 250 TABLET, FILM COATED ORAL at 18:46

## 2018-10-21 RX ADMIN — Medication 120 MILLIGRAM(S): at 05:43

## 2018-10-21 RX ADMIN — Medication 1: at 00:52

## 2018-10-21 RX ADMIN — Medication 3 MILLILITER(S): at 00:51

## 2018-10-21 RX ADMIN — Medication 50 MILLIGRAM(S): at 18:46

## 2018-10-21 RX ADMIN — Medication 3 MILLILITER(S): at 05:01

## 2018-10-21 RX ADMIN — Medication 3 MILLILITER(S): at 23:09

## 2018-10-21 RX ADMIN — NYSTATIN CREAM 1 APPLICATION(S): 100000 CREAM TOPICAL at 05:01

## 2018-10-21 RX ADMIN — Medication 120 MILLIGRAM(S): at 12:39

## 2018-10-21 RX ADMIN — Medication 3 MILLILITER(S): at 12:35

## 2018-10-21 NOTE — PROGRESS NOTE ADULT - SUBJECTIVE AND OBJECTIVE BOX
Interval Events: Pt seen and examined with son at bedside. Pt lethargic today. Unable to attain ROS.   Pt passed s/s eval yesterday on dysphagia diet.       MEDICATIONS  (STANDING):  ALBUTerol/ipratropium for Nebulization 3 milliLiter(s) Nebulizer every 6 hours  dextrose 5%. 1000 milliLiter(s) (50 mL/Hr) IV Continuous <Continuous>  dextrose 50% Injectable 12.5 Gram(s) IV Push once  dextrose 50% Injectable 25 Gram(s) IV Push once  dextrose 50% Injectable 25 Gram(s) IV Push once  heparin  Infusion. 700 Unit(s)/Hr (7 mL/Hr) IV Continuous <Continuous>  insulin lispro (HumaLOG) corrective regimen sliding scale   SubCutaneous every 6 hours  levETIRAcetam  IVPB 500 milliGRAM(s) IV Intermittent every 12 hours  metoprolol tartrate IVPB 10 milliGRAM(s) IV Intermittent every 6 hours  nystatin Powder 1 Application(s) Topical every 12 hours  sodium chloride 0.9% with potassium chloride 20 mEq/L 1000 milliLiter(s) (50 mL/Hr) IV Continuous <Continuous>    MEDICATIONS  (PRN):  acetaminophen   Tablet .. 650 milliGRAM(s) Oral every 6 hours PRN Temp greater or equal to 38C (100.4F)  dextrose 40% Gel 15 Gram(s) Oral once PRN Blood Glucose LESS THAN 70 milliGRAM(s)/deciLiter  glucagon  Injectable 1 milliGRAM(s) IntraMuscular once PRN Glucose <70 milliGRAM(s)/deciLiter  haloperidol    Injectable 1 milliGRAM(s) IV Push every 6 hours PRN Agitation  heparin  Injectable 3500 Unit(s) IV Push every 6 hours PRN For aPTT less than 40  heparin  Injectable 1500 Unit(s) IV Push every 6 hours PRN For aPTT between 40 - 57      Allergies    No Known Allergies    Intolerances        Review of Systems: unabel to attain    Vital Signs Last 24 Hrs  T(C): 37.5 (21 Oct 2018 09:34), Max: 37.5 (21 Oct 2018 04:59)  T(F): 99.5 (21 Oct 2018 09:34), Max: 99.5 (21 Oct 2018 04:59)  HR: 110 (21 Oct 2018 09:34) (93 - 114)  BP: 152/84 (21 Oct 2018 09:34) (114/56 - 152/84)  BP(mean): --  RR: 18 (21 Oct 2018 09:34) (18 - 18)  SpO2: 98% (21 Oct 2018 09:34) (95% - 100%)    PHYSICAL EXAM:    Constitutional: NAD, + lethargic   HEENT: EOMI, throat clear  Neck: No LAD, supple  Respiratory: CTA and P  Cardiovascular: S1 and S2, RRR, no M  Gastrointestinal: BS+, soft, NT/ND, neg HSM,  Extremities: No peripheral edema, neg clubing, cyanosis  Vascular: 2+ peripheral pulses  Skin: No rashes      LABS:                        11.1   13.2  )-----------( 263      ( 20 Oct 2018 07:06 )             33.3     10-21    144  |  109<H>  |  14  ----------------------------<  165<H>  3.5   |  22  |  0.61    Ca    8.3<L>      21 Oct 2018 07:32      PT/INR - ( 21 Oct 2018 09:00 )   PT: 14.1 sec;   INR: 1.24 ratio         PTT - ( 21 Oct 2018 09:00 )  PTT:82.0 sec      RADIOLOGY & ADDITIONAL TESTS:

## 2018-10-21 NOTE — PROGRESS NOTE ADULT - ASSESSMENT
Patient is a 74 yo F with PMHx of COPD, Bronchitis, Afib (on home coumadin), Right basal ganglion infarct (with resulting dysphagia s/p PEG and residual left spastic hemiparesis) who presented to the ED after unwitnessed fall and possible seizure activity s/p extubation 10/15.

## 2018-10-21 NOTE — PROGRESS NOTE ADULT - SUBJECTIVE AND OBJECTIVE BOX
Patient is a 73y old  Female who presents with a chief complaint of Fall, AMS (21 Oct 2018 12:20)    SUBJECTIVE / OVERNIGHT EVENTS: no overnight events    ROS:  Resp: No cough no sputum production  CVS: No chest pain no palpitations no orthopnea  GI: no N/V/D  : no dysuria, no hematuria  Neuro: no weakness no paresthesias  Heme: No petechiae no easy bruising  Msk: No joint pain no swelling  Skin: No rash no itching        MEDICATIONS  (STANDING):  ALBUTerol/ipratropium for Nebulization 3 milliLiter(s) Nebulizer every 6 hours  dextrose 5%. 1000 milliLiter(s) (50 mL/Hr) IV Continuous <Continuous>  dextrose 50% Injectable 12.5 Gram(s) IV Push once  dextrose 50% Injectable 25 Gram(s) IV Push once  dextrose 50% Injectable 25 Gram(s) IV Push once  heparin  Infusion. 700 Unit(s)/Hr (7 mL/Hr) IV Continuous <Continuous>  levETIRAcetam  IVPB 500 milliGRAM(s) IV Intermittent every 12 hours  metoprolol tartrate IVPB 10 milliGRAM(s) IV Intermittent every 6 hours  nystatin Powder 1 Application(s) Topical every 12 hours  sodium chloride 0.9% with potassium chloride 20 mEq/L 1000 milliLiter(s) (50 mL/Hr) IV Continuous <Continuous>  warfarin 2.5 milliGRAM(s) Oral once    MEDICATIONS  (PRN):  acetaminophen   Tablet .. 650 milliGRAM(s) Oral every 6 hours PRN Temp greater or equal to 38C (100.4F)  dextrose 40% Gel 15 Gram(s) Oral once PRN Blood Glucose LESS THAN 70 milliGRAM(s)/deciLiter  glucagon  Injectable 1 milliGRAM(s) IntraMuscular once PRN Glucose <70 milliGRAM(s)/deciLiter  haloperidol    Injectable 1 milliGRAM(s) IV Push every 6 hours PRN Agitation  heparin  Injectable 3500 Unit(s) IV Push every 6 hours PRN For aPTT less than 40  heparin  Injectable 1500 Unit(s) IV Push every 6 hours PRN For aPTT between 40 - 57        CAPILLARY BLOOD GLUCOSE      POCT Blood Glucose.: 149 mg/dL (21 Oct 2018 12:51)  POCT Blood Glucose.: 142 mg/dL (21 Oct 2018 06:21)  POCT Blood Glucose.: 170 mg/dL (21 Oct 2018 00:26)  POCT Blood Glucose.: 180 mg/dL (20 Oct 2018 22:02)  POCT Blood Glucose.: 162 mg/dL (20 Oct 2018 17:35)    I&O's Summary    20 Oct 2018 07:01  -  21 Oct 2018 07:00  --------------------------------------------------------  IN: 2128 mL / OUT: 1600 mL / NET: 528 mL    21 Oct 2018 07:01  -  21 Oct 2018 16:18  --------------------------------------------------------  IN: 0 mL / OUT: 500 mL / NET: -500 mL        Vital Signs Last 24 Hrs  T(C): 37.3 (21 Oct 2018 13:00), Max: 37.5 (21 Oct 2018 04:59)  T(F): 99.1 (21 Oct 2018 13:00), Max: 99.5 (21 Oct 2018 04:59)  HR: 93 (21 Oct 2018 13:00) (93 - 114)  BP: 158/87 (21 Oct 2018 13:00) (114/56 - 158/87)  BP(mean): --  RR: 18 (21 Oct 2018 13:00) (18 - 18)  SpO2: 99% (21 Oct 2018 13:00) (95% - 100%)    PHYSICAL EXAM:  GENERAL: NAD, asthenic  sitting in bedside chair  HEAD:  left facial ecchymoses resolving, Normocephalic  EYES: EOMI, PERRLA, conjunctiva and sclera clear  NECK: Supple, No JVD  CHEST/LUNG: no rhonchi, no expiratory wheeze  HEART: S1 S2; No rubs or gallops, no murmurs appreciated  ABDOMEN: Soft, Nontender, Nondistended; Bowel sounds present  EXTREMITIES:  No clubbing or cyanosis, + Peripheral Pulses,  no edema  PSYCH: Alert,   NEUROLOGY: left UE contracted since CVA  left leg 2 - 3/5 power  SKIN: No rashes or lesions    LABS:                        9.6    17.35 )-----------( 273      ( 21 Oct 2018 09:04 )             28.9     10-21    144  |  109<H>  |  14  ----------------------------<  165<H>  3.5   |  22  |  0.61    Ca    8.3<L>      21 Oct 2018 07:32      PT/INR - ( 21 Oct 2018 09:00 )   PT: 14.1 sec;   INR: 1.24 ratio         PTT - ( 21 Oct 2018 09:00 )  PTT:82.0 sec            All consultant(s) notes reviewed and care discussed with other providers    Contact Number, Dr Styles [phone]

## 2018-10-22 LAB
ANION GAP SERPL CALC-SCNC: 10 MMOL/L — SIGNIFICANT CHANGE UP (ref 5–17)
BUN SERPL-MCNC: 8 MG/DL — SIGNIFICANT CHANGE UP (ref 7–23)
CALCIUM SERPL-MCNC: 8.9 MG/DL — SIGNIFICANT CHANGE UP (ref 8.4–10.5)
CHLORIDE SERPL-SCNC: 107 MMOL/L — SIGNIFICANT CHANGE UP (ref 96–108)
CO2 SERPL-SCNC: 23 MMOL/L — SIGNIFICANT CHANGE UP (ref 22–31)
CREAT SERPL-MCNC: 0.59 MG/DL — SIGNIFICANT CHANGE UP (ref 0.5–1.3)
GLUCOSE SERPL-MCNC: 145 MG/DL — HIGH (ref 70–99)
HCT VFR BLD CALC: 28.4 % — LOW (ref 34.5–45)
HGB BLD-MCNC: 9.5 G/DL — LOW (ref 11.5–15.5)
INR BLD: 1.75 RATIO — HIGH (ref 0.88–1.16)
MCHC RBC-ENTMCNC: 32.2 PG — SIGNIFICANT CHANGE UP (ref 27–34)
MCHC RBC-ENTMCNC: 33.5 GM/DL — SIGNIFICANT CHANGE UP (ref 32–36)
MCV RBC AUTO: 96.3 FL — SIGNIFICANT CHANGE UP (ref 80–100)
PLATELET # BLD AUTO: 284 K/UL — SIGNIFICANT CHANGE UP (ref 150–400)
POTASSIUM SERPL-MCNC: 4.2 MMOL/L — SIGNIFICANT CHANGE UP (ref 3.5–5.3)
POTASSIUM SERPL-SCNC: 4.2 MMOL/L — SIGNIFICANT CHANGE UP (ref 3.5–5.3)
PROTHROM AB SERPL-ACNC: 20 SEC — HIGH (ref 10–13.1)
RBC # BLD: 2.95 M/UL — LOW (ref 3.8–5.2)
RBC # FLD: 14.1 % — SIGNIFICANT CHANGE UP (ref 10.3–14.5)
SODIUM SERPL-SCNC: 140 MMOL/L — SIGNIFICANT CHANGE UP (ref 135–145)
WBC # BLD: 15.99 K/UL — HIGH (ref 3.8–10.5)
WBC # FLD AUTO: 15.99 K/UL — HIGH (ref 3.8–10.5)

## 2018-10-22 RX ORDER — WARFARIN SODIUM 2.5 MG/1
1 TABLET ORAL ONCE
Qty: 0 | Refills: 0 | Status: COMPLETED | OUTPATIENT
Start: 2018-10-22 | End: 2018-10-22

## 2018-10-22 RX ORDER — POTASSIUM CHLORIDE 20 MEQ
20 PACKET (EA) ORAL EVERY 4 HOURS
Qty: 0 | Refills: 0 | Status: COMPLETED | OUTPATIENT
Start: 2018-10-22 | End: 2018-10-22

## 2018-10-22 RX ADMIN — Medication 3 MILLILITER(S): at 23:00

## 2018-10-22 RX ADMIN — Medication 3 MILLILITER(S): at 17:29

## 2018-10-22 RX ADMIN — Medication 20 MILLIEQUIVALENT(S): at 11:14

## 2018-10-22 RX ADMIN — LEVETIRACETAM 500 MILLIGRAM(S): 250 TABLET, FILM COATED ORAL at 17:29

## 2018-10-22 RX ADMIN — Medication 50 MILLIGRAM(S): at 05:17

## 2018-10-22 RX ADMIN — Medication 50 MILLIGRAM(S): at 17:29

## 2018-10-22 RX ADMIN — NYSTATIN CREAM 1 APPLICATION(S): 100000 CREAM TOPICAL at 05:17

## 2018-10-22 RX ADMIN — Medication 3 MILLILITER(S): at 11:14

## 2018-10-22 RX ADMIN — LEVETIRACETAM 500 MILLIGRAM(S): 250 TABLET, FILM COATED ORAL at 05:17

## 2018-10-22 RX ADMIN — Medication 3 MILLILITER(S): at 05:18

## 2018-10-22 RX ADMIN — Medication 20 MILLIEQUIVALENT(S): at 17:32

## 2018-10-22 RX ADMIN — NYSTATIN CREAM 1 APPLICATION(S): 100000 CREAM TOPICAL at 17:28

## 2018-10-22 RX ADMIN — WARFARIN SODIUM 1 MILLIGRAM(S): 2.5 TABLET ORAL at 21:08

## 2018-10-22 NOTE — PROGRESS NOTE ADULT - ASSESSMENT
73yearold female with past medical history of hypertension, atrial fibrillation, status post cerebrovascular accident now presents with acute kidney injury.    Post obstruction causing ATN    1.  - soria catheter in place for urinary retention   2. SP  Hyponatremia  - improved  3. Hypokalemia -     PLAN:  1. Start Flomax  2. DC IVF ; Replete potassium today  3. Dysphagia diet

## 2018-10-22 NOTE — PROGRESS NOTE ADULT - SUBJECTIVE AND OBJECTIVE BOX
Interval Events: Pt seen and examined. She denies abdominal pain, N/V  Tolerating PO well  more alert today    MEDICATIONS  (STANDING):  ALBUTerol/ipratropium for Nebulization 3 milliLiter(s) Nebulizer every 6 hours  dextrose 5%. 1000 milliLiter(s) (50 mL/Hr) IV Continuous <Continuous>  dextrose 50% Injectable 12.5 Gram(s) IV Push once  dextrose 50% Injectable 25 Gram(s) IV Push once  dextrose 50% Injectable 25 Gram(s) IV Push once  heparin  Infusion. 700 Unit(s)/Hr (7 mL/Hr) IV Continuous <Continuous>  levETIRAcetam 500 milliGRAM(s) Oral two times a day  metoprolol tartrate 50 milliGRAM(s) Oral two times a day  nystatin Powder 1 Application(s) Topical every 12 hours  potassium chloride   Solution 20 milliEquivalent(s) Oral every 4 hours    MEDICATIONS  (PRN):  acetaminophen   Tablet .. 650 milliGRAM(s) Oral every 6 hours PRN Temp greater or equal to 38C (100.4F)  dextrose 40% Gel 15 Gram(s) Oral once PRN Blood Glucose LESS THAN 70 milliGRAM(s)/deciLiter  glucagon  Injectable 1 milliGRAM(s) IntraMuscular once PRN Glucose <70 milliGRAM(s)/deciLiter  haloperidol    Injectable 1 milliGRAM(s) IV Push every 6 hours PRN Agitation  heparin  Injectable 3500 Unit(s) IV Push every 6 hours PRN For aPTT less than 40  heparin  Injectable 1500 Unit(s) IV Push every 6 hours PRN For aPTT between 40 - 57      Allergies    No Known Allergies    Intolerances        Review of Systems:    General:  No wt loss, fevers, chills, night sweats,fatigue,   Eyes:  Good vision, no reported pain  ENT:  No sore throat, pain, runny nose, dysphagia  CV:  No pain, palpitations, hypo/hypertension  Resp:  No dyspnea, cough, tachypnea, wheezing  GI:  No pain, No nausea, No vomiting, No diarrhea, No constipation, No weight loss, No fever, No pruritis, No rectal bleeding, No melena, No dysphagia  :  No pain, bleeding, incontinence, nocturia  Muscle:  No pain, weakness  Neuro:  No weakness, tingling, memory problems  Psych:  No fatigue, insomnia, mood problems, depression  Endocrine:  No polyuria, polydypsia, cold/heat intolerance  Heme:  No petechiae, ecchymosis, easy bruisability  Skin:  No rash, tattoos, scars, edema      Vital Signs Last 24 Hrs  T(C): 36.7 (22 Oct 2018 11:08), Max: 37.3 (21 Oct 2018 13:00)  T(F): 98 (22 Oct 2018 11:08), Max: 99.1 (21 Oct 2018 13:00)  HR: 107 (22 Oct 2018 11:08) (87 - 108)  BP: 158/81 (22 Oct 2018 11:08) (134/83 - 161/84)  BP(mean): --  RR: 18 (22 Oct 2018 11:08) (18 - 20)  SpO2: 97% (22 Oct 2018 11:08) (93% - 99%)    PHYSICAL EXAM:    Constitutional: NAD, well-developed  HEENT: EOMI, throat clear  Neck: No LAD, supple  Respiratory: CTA and P  Cardiovascular: S1 and S2, RRR, no M  Gastrointestinal: BS+, soft, NT/ND, neg HSM,  Extremities: No peripheral edema, neg clubing, cyanosis  Vascular: 2+ peripheral pulses  Neurological: A/O x 3, no focal deficits  Psychiatric: Normal mood, normal affect  Skin: No rashes      LABS:                        9.5    15.99 )-----------( 284      ( 22 Oct 2018 09:10 )             28.4     10-21    144  |  109<H>  |  14  ----------------------------<  165<H>  3.5   |  22  |  0.61    Ca    8.3<L>      21 Oct 2018 07:32      PT/INR - ( 22 Oct 2018 09:16 )   PT: 20.0 sec;   INR: 1.75 ratio         PTT - ( 21 Oct 2018 09:00 )  PTT:82.0 sec      RADIOLOGY & ADDITIONAL TESTS:

## 2018-10-22 NOTE — PROGRESS NOTE ADULT - ASSESSMENT
s/p respiratory failure COPD improving - continue bronchodilators.     Dysphagia II -- on dysphagia diet; upright positioning with feeding.  (Pt pulled out feeding tube)     renal function improving    s/p seizure - appears stable    Intermittent a fib -- on heparin  warfarin strated

## 2018-10-22 NOTE — PROGRESS NOTE ADULT - SUBJECTIVE AND OBJECTIVE BOX
[facility] [phone]        Patient seen and examined in bed.  She was more alert and lucid this am        MEDICATIONS  (STANDING):  ALBUTerol/ipratropium for Nebulization 3 milliLiter(s) Nebulizer every 6 hours  dextrose 5%. 1000 milliLiter(s) (50 mL/Hr) IV Continuous <Continuous>  dextrose 50% Injectable 12.5 Gram(s) IV Push once  dextrose 50% Injectable 25 Gram(s) IV Push once  dextrose 50% Injectable 25 Gram(s) IV Push once  heparin  Infusion. 700 Unit(s)/Hr (7 mL/Hr) IV Continuous <Continuous>  levETIRAcetam 500 milliGRAM(s) Oral two times a day  metoprolol tartrate 50 milliGRAM(s) Oral two times a day  nystatin Powder 1 Application(s) Topical every 12 hours  sodium chloride 0.9% with potassium chloride 20 mEq/L 1000 milliLiter(s) (50 mL/Hr) IV Continuous <Continuous>      VITAL:  T(C): , Max: 37.5 (10-21-18 @ 09:34)  T(F): , Max: 99.5 (10-21-18 @ 09:34)  HR: 87 (10-22-18 @ 08:35)  BP: 149/84 (10-22-18 @ 08:35)  BP(mean): --  RR: 18 (10-22-18 @ 08:35)  SpO2: 95% (10-22-18 @ 08:35)  Wt(kg): --    I and O's:    10-21 @ 07:01  -  10-22 @ 07:00  --------------------------------------------------------  IN: 1708 mL / OUT: 1100 mL / NET: 608 mL          PHYSICAL EXAM:    Constitutional: NAD; cachetic  Neck:  No JVD  Respiratory: poor effort  Cardiovascular: S1 and S2  Gastrointestinal: BS+, soft, NT/ND  Extremities: No peripheral edema  Neurological: A/O x 3,  left sided weakness  Psychiatric: Normal mood, normal affect  : +  [place]  Skin: No rashes  Access: Not applicable    LABS:                        9.6    17.35 )-----------( 273      ( 21 Oct 2018 09:04 )             28.9     10-21    144  |  109<H>  |  14  ----------------------------<  165<H>  3.5   |  22  |  0.61    Ca    8.3<L>      21 Oct 2018 07:32            Urine Studies:          RADIOLOGY & ADDITIONAL STUDIES:

## 2018-10-22 NOTE — PROGRESS NOTE ADULT - SUBJECTIVE AND OBJECTIVE BOX
PULM/MED PROGRESS NOTE:  Sitting in chair comfortably.  awake, alert; receiving neb treatment      MEDICATIONS  (STANDING):  ALBUTerol/ipratropium for Nebulization 3 milliLiter(s) Nebulizer every 6 hours  dextrose 5%. 1000 milliLiter(s) (50 mL/Hr) IV Continuous <Continuous>  dextrose 50% Injectable 12.5 Gram(s) IV Push once  dextrose 50% Injectable 25 Gram(s) IV Push once  dextrose 50% Injectable 25 Gram(s) IV Push once  heparin  Infusion. 700 Unit(s)/Hr (7 mL/Hr) IV Continuous <Continuous>  levETIRAcetam 500 milliGRAM(s) Oral two times a day  metoprolol tartrate 50 milliGRAM(s) Oral two times a day  nystatin Powder 1 Application(s) Topical every 12 hours  potassium chloride   Solution 20 milliEquivalent(s) Oral every 4 hours      MEDICATIONS  (PRN):  acetaminophen   Tablet .. 650 milliGRAM(s) Oral every 6 hours PRN Temp greater or equal to 38C (100.4F)  dextrose 40% Gel 15 Gram(s) Oral once PRN Blood Glucose LESS THAN 70 milliGRAM(s)/deciLiter  glucagon  Injectable 1 milliGRAM(s) IntraMuscular once PRN Glucose <70 milliGRAM(s)/deciLiter  haloperidol    Injectable 1 milliGRAM(s) IV Push every 6 hours PRN Agitation  heparin  Injectable 3500 Unit(s) IV Push every 6 hours PRN For aPTT less than 40  heparin  Injectable 1500 Unit(s) IV Push every 6 hours PRN For aPTT between 40 - 57          Vital Signs Last 24 Hrs  T(C): 36.7 (22 Oct 2018 11:08), Max: 37.3 (21 Oct 2018 13:00)  T(F): 98 (22 Oct 2018 11:08), Max: 99.1 (21 Oct 2018 13:00)  HR: 107 (22 Oct 2018 11:08) (87 - 108)  BP: 158/81 (22 Oct 2018 11:08) (134/83 - 161/84)  BP(mean): --  RR: 18 (22 Oct 2018 11:08) (18 - 20)  SpO2: 97% (22 Oct 2018 11:08) (93% - 99%)    PHYSICAL EXAMINATION:    GENERAL: The patient is awake and alert in no apparent distress.     HEENT: No oral thrush.   Mucous membranes are moist.    NECK: Supple.    LUNGS: Bilateral breath sounds; rhonchi present    HEART: Regular rate    ABDOMEN: Soft, nontender    EXTREMITIES: Without edema.      LABS:                        9.5    15.99 )-----------( 284      ( 22 Oct 2018 09:10 )             28.4     10-21    144  |  109<H>  |  14  ----------------------------<  165<H>  3.5   |  22  |  0.61    Ca    8.3<L>      21 Oct 2018 07:32      PT/INR - ( 22 Oct 2018 09:16 )   PT: 20.0 sec;   INR: 1.75 ratio         PTT - ( 21 Oct 2018 09:00 )  PTT:82.0 sec

## 2018-10-22 NOTE — PROGRESS NOTE ADULT - SUBJECTIVE AND OBJECTIVE BOX
Patient is a 73y old  Female who presents with a chief complaint of Fall, AMS (22 Oct 2018 12:54)    SUBJECTIVE / OVERNIGHT EVENTS: no overnight events  awake and cooperative sitting in chair    ROS:  Resp: No cough no sputum production  CVS: No chest pain no palpitations no orthopnea  GI: no N/V/D  : no dysuria, no hematuria  Neuro: no weakness no paresthesias  Heme: No petechiae no easy bruising  Msk: No joint pain no swelling  Skin: No rash no itching        MEDICATIONS  (STANDING):  ALBUTerol/ipratropium for Nebulization 3 milliLiter(s) Nebulizer every 6 hours  dextrose 5%. 1000 milliLiter(s) (50 mL/Hr) IV Continuous <Continuous>  dextrose 50% Injectable 12.5 Gram(s) IV Push once  dextrose 50% Injectable 25 Gram(s) IV Push once  dextrose 50% Injectable 25 Gram(s) IV Push once  heparin  Infusion. 700 Unit(s)/Hr (7 mL/Hr) IV Continuous <Continuous>  levETIRAcetam 500 milliGRAM(s) Oral two times a day  metoprolol tartrate 50 milliGRAM(s) Oral two times a day  nystatin Powder 1 Application(s) Topical every 12 hours  potassium chloride   Solution 20 milliEquivalent(s) Oral every 4 hours  warfarin 1 milliGRAM(s) Oral once    MEDICATIONS  (PRN):  acetaminophen   Tablet .. 650 milliGRAM(s) Oral every 6 hours PRN Temp greater or equal to 38C (100.4F)  dextrose 40% Gel 15 Gram(s) Oral once PRN Blood Glucose LESS THAN 70 milliGRAM(s)/deciLiter  glucagon  Injectable 1 milliGRAM(s) IntraMuscular once PRN Glucose <70 milliGRAM(s)/deciLiter  haloperidol    Injectable 1 milliGRAM(s) IV Push every 6 hours PRN Agitation  heparin  Injectable 3500 Unit(s) IV Push every 6 hours PRN For aPTT less than 40  heparin  Injectable 1500 Unit(s) IV Push every 6 hours PRN For aPTT between 40 - 57        CAPILLARY BLOOD GLUCOSE      POCT Blood Glucose.: 141 mg/dL (21 Oct 2018 17:07)    I&O's Summary    21 Oct 2018 07:01  -  22 Oct 2018 07:00  --------------------------------------------------------  IN: 1708 mL / OUT: 1100 mL / NET: 608 mL    22 Oct 2018 07:01  -  22 Oct 2018 14:41  --------------------------------------------------------  IN: 100 mL / OUT: 300 mL / NET: -200 mL        Vital Signs Last 24 Hrs  T(C): 36.7 (22 Oct 2018 11:08), Max: 37.2 (22 Oct 2018 04:32)  T(F): 98 (22 Oct 2018 11:08), Max: 98.9 (22 Oct 2018 04:32)  HR: 107 (22 Oct 2018 11:08) (87 - 108)  BP: 158/81 (22 Oct 2018 11:08) (134/83 - 161/84)  BP(mean): --  RR: 18 (22 Oct 2018 11:08) (18 - 20)  SpO2: 97% (22 Oct 2018 11:08) (93% - 99%)    PHYSICAL EXAM:  GENERAL: NAD, asthenic  sitting in bedside chair  HEAD:  left facial ecchymoses resolving, Normocephalic  EYES: EOMI, PERRLA, conjunctiva and sclera clear  NECK: Supple, No JVD  CHEST/LUNG: no rhonchi, no expiratory wheeze  HEART: S1 S2; No rubs or gallops, no murmurs appreciated  ABDOMEN: Soft, Nontender, Nondistended; Bowel sounds present  EXTREMITIES:  No clubbing or cyanosis, + Peripheral Pulses,  no edema  PSYCH: Alert,   NEUROLOGY: left UE contracted since CVA  left leg 2 - 3/5 power  SKIN: No rashes or lesions    LABS:                        9.5    15.99 )-----------( 284      ( 22 Oct 2018 09:10 )             28.4     10-21    144  |  109<H>  |  14  ----------------------------<  165<H>  3.5   |  22  |  0.61    Ca    8.3<L>      21 Oct 2018 07:32      PT/INR - ( 22 Oct 2018 09:16 )   PT: 20.0 sec;   INR: 1.75 ratio         PTT - ( 21 Oct 2018 09:00 )  PTT:82.0 sec            All consultant(s) notes reviewed and care discussed with other providers    Contact Number, Dr Styles [phone]

## 2018-10-23 LAB
APTT BLD: 57 SEC — HIGH (ref 27.5–37.4)
HCT VFR BLD CALC: 30.6 % — LOW (ref 34.5–45)
HGB BLD-MCNC: 10.1 G/DL — LOW (ref 11.5–15.5)
INR BLD: 2.49 RATIO — HIGH (ref 0.88–1.16)
MCHC RBC-ENTMCNC: 32.1 PG — SIGNIFICANT CHANGE UP (ref 27–34)
MCHC RBC-ENTMCNC: 33 GM/DL — SIGNIFICANT CHANGE UP (ref 32–36)
MCV RBC AUTO: 97.1 FL — SIGNIFICANT CHANGE UP (ref 80–100)
PLATELET # BLD AUTO: 328 K/UL — SIGNIFICANT CHANGE UP (ref 150–400)
PROTHROM AB SERPL-ACNC: 28.7 SEC — HIGH (ref 10–13.1)
RBC # BLD: 3.15 M/UL — LOW (ref 3.8–5.2)
RBC # FLD: 14.1 % — SIGNIFICANT CHANGE UP (ref 10.3–14.5)
WBC # BLD: 17.28 K/UL — HIGH (ref 3.8–10.5)
WBC # FLD AUTO: 17.28 K/UL — HIGH (ref 3.8–10.5)

## 2018-10-23 PROCEDURE — 99233 SBSQ HOSP IP/OBS HIGH 50: CPT

## 2018-10-23 RX ORDER — TAMSULOSIN HYDROCHLORIDE 0.4 MG/1
0.4 CAPSULE ORAL AT BEDTIME
Qty: 0 | Refills: 0 | Status: DISCONTINUED | OUTPATIENT
Start: 2018-10-23 | End: 2018-10-31

## 2018-10-23 RX ORDER — AMLODIPINE BESYLATE 2.5 MG/1
2.5 TABLET ORAL DAILY
Qty: 0 | Refills: 0 | Status: DISCONTINUED | OUTPATIENT
Start: 2018-10-23 | End: 2018-10-24

## 2018-10-23 RX ORDER — WARFARIN SODIUM 2.5 MG/1
1 TABLET ORAL ONCE
Qty: 0 | Refills: 0 | Status: COMPLETED | OUTPATIENT
Start: 2018-10-23 | End: 2018-10-23

## 2018-10-23 RX ADMIN — Medication 3 MILLILITER(S): at 13:03

## 2018-10-23 RX ADMIN — HEPARIN SODIUM 1500 UNIT(S): 5000 INJECTION INTRAVENOUS; SUBCUTANEOUS at 11:01

## 2018-10-23 RX ADMIN — HEPARIN SODIUM 800 UNIT(S)/HR: 5000 INJECTION INTRAVENOUS; SUBCUTANEOUS at 11:01

## 2018-10-23 RX ADMIN — TAMSULOSIN HYDROCHLORIDE 0.4 MILLIGRAM(S): 0.4 CAPSULE ORAL at 21:30

## 2018-10-23 RX ADMIN — Medication 3 MILLILITER(S): at 05:43

## 2018-10-23 RX ADMIN — Medication 50 MILLIGRAM(S): at 05:44

## 2018-10-23 RX ADMIN — LEVETIRACETAM 500 MILLIGRAM(S): 250 TABLET, FILM COATED ORAL at 05:44

## 2018-10-23 RX ADMIN — Medication 50 MILLIGRAM(S): at 19:05

## 2018-10-23 RX ADMIN — NYSTATIN CREAM 1 APPLICATION(S): 100000 CREAM TOPICAL at 19:05

## 2018-10-23 RX ADMIN — LEVETIRACETAM 500 MILLIGRAM(S): 250 TABLET, FILM COATED ORAL at 18:32

## 2018-10-23 RX ADMIN — WARFARIN SODIUM 1 MILLIGRAM(S): 2.5 TABLET ORAL at 21:30

## 2018-10-23 RX ADMIN — Medication 3 MILLILITER(S): at 18:31

## 2018-10-23 RX ADMIN — NYSTATIN CREAM 1 APPLICATION(S): 100000 CREAM TOPICAL at 05:44

## 2018-10-23 NOTE — PROGRESS NOTE ADULT - PROBLEM SELECTOR PLAN 2
Neuro recommendations appreciated   continue Keppra 500 BID Neuro recommendations appreciated  continue Keppra 500 BID  seizures on admission likely secondary to sequelae of old CVA

## 2018-10-23 NOTE — PROGRESS NOTE ADULT - SUBJECTIVE AND OBJECTIVE BOX
Patient is a 73y old  Female who presents with a chief complaint of Fall, AMS (23 Oct 2018 08:55)    SUBJECTIVE / OVERNIGHT EVENTS: no overnight events    ROS:  Resp: No cough no sputum production  CVS: No chest pain no palpitations no orthopnea  GI: no N/V/D  : no dysuria, no hematuria  Neuro: no weakness no paresthesias  Heme: No petechiae no easy bruising  Msk: No joint pain no swelling  Skin: No rash no itching        MEDICATIONS  (STANDING):  ALBUTerol/ipratropium for Nebulization 3 milliLiter(s) Nebulizer every 6 hours  amLODIPine   Tablet 2.5 milliGRAM(s) Oral daily  dextrose 5%. 1000 milliLiter(s) (50 mL/Hr) IV Continuous <Continuous>  dextrose 50% Injectable 12.5 Gram(s) IV Push once  dextrose 50% Injectable 25 Gram(s) IV Push once  dextrose 50% Injectable 25 Gram(s) IV Push once  levETIRAcetam 500 milliGRAM(s) Oral two times a day  metoprolol tartrate 50 milliGRAM(s) Oral two times a day  nystatin Powder 1 Application(s) Topical every 12 hours  tamsulosin 0.4 milliGRAM(s) Oral at bedtime    MEDICATIONS  (PRN):  acetaminophen   Tablet .. 650 milliGRAM(s) Oral every 6 hours PRN Temp greater or equal to 38C (100.4F)  dextrose 40% Gel 15 Gram(s) Oral once PRN Blood Glucose LESS THAN 70 milliGRAM(s)/deciLiter  glucagon  Injectable 1 milliGRAM(s) IntraMuscular once PRN Glucose <70 milliGRAM(s)/deciLiter  haloperidol    Injectable 1 milliGRAM(s) IV Push every 6 hours PRN Agitation        CAPILLARY BLOOD GLUCOSE        I&O's Summary    22 Oct 2018 07:01  -  23 Oct 2018 07:00  --------------------------------------------------------  IN: 808 mL / OUT: 1700 mL / NET: -892 mL        Vital Signs Last 24 Hrs  T(C): 36.7 (23 Oct 2018 14:18), Max: 36.8 (22 Oct 2018 22:00)  T(F): 98 (23 Oct 2018 14:18), Max: 98.2 (22 Oct 2018 22:00)  HR: 102 (23 Oct 2018 14:18) (93 - 119)  BP: 113/79 (23 Oct 2018 14:18) (113/79 - 180/88)  BP(mean): --  RR: 18 (23 Oct 2018 14:18) (18 - 20)  SpO2: 98% (23 Oct 2018 14:18) (95% - 100%)    PHYSICAL EXAM:  GENERAL: NAD, asthenic  sitting in bedside chair  HEAD:  left facial ecchymoses resolving, Normocephalic  EYES: EOMI, PERRLA, conjunctiva and sclera clear  NECK: Supple, No JVD  CHEST/LUNG: no rhonchi, no expiratory wheeze  HEART: S1 S2; No rubs or gallops, no murmurs appreciated  ABDOMEN: Soft, Nontender, Nondistended; Bowel sounds present  EXTREMITIES:  No clubbing or cyanosis, + Peripheral Pulses,  no edema  PSYCH: Alert,   NEUROLOGY: left UE contracted since CVA  left leg 2 - 3/5 power  SKIN: No rashes or lesions    LABS:                        10.1   17.28 )-----------( 328      ( 23 Oct 2018 08:09 )             30.6     10-22    140  |  107  |  8   ----------------------------<  145<H>  4.2   |  23  |  0.59    Ca    8.9      22 Oct 2018 22:23      PT/INR - ( 23 Oct 2018 08:14 )   PT: 28.7 sec;   INR: 2.49 ratio         PTT - ( 23 Oct 2018 08:14 )  PTT:57.0 sec            All consultant(s) notes reviewed and care discussed with other providers    Contact Number, Dr Styles [phone]

## 2018-10-23 NOTE — PROGRESS NOTE ADULT - ASSESSMENT
s/p respiratory failure  COPD  Dysphagia - taking po     Continue bronchodilators.   Renal fx improved  Discharge planning as per Dr. Styles

## 2018-10-23 NOTE — PROGRESS NOTE ADULT - SUBJECTIVE AND OBJECTIVE BOX
PULM/MED PROGRESS NOTE:  (Note based on visit earlier today)      awake, alert comfortable.  Sitting in recliner   at bedside      MEDICATIONS  (STANDING):  ALBUTerol/ipratropium for Nebulization 3 milliLiter(s) Nebulizer every 6 hours  amLODIPine   Tablet 2.5 milliGRAM(s) Oral daily  dextrose 5%. 1000 milliLiter(s) (50 mL/Hr) IV Continuous <Continuous>  dextrose 50% Injectable 12.5 Gram(s) IV Push once  dextrose 50% Injectable 25 Gram(s) IV Push once  dextrose 50% Injectable 25 Gram(s) IV Push once  levETIRAcetam 500 milliGRAM(s) Oral two times a day  metoprolol tartrate 50 milliGRAM(s) Oral two times a day  nystatin Powder 1 Application(s) Topical every 12 hours  tamsulosin 0.4 milliGRAM(s) Oral at bedtime  warfarin 1 milliGRAM(s) Oral once      MEDICATIONS  (PRN):  acetaminophen   Tablet .. 650 milliGRAM(s) Oral every 6 hours PRN Temp greater or equal to 38C (100.4F)  dextrose 40% Gel 15 Gram(s) Oral once PRN Blood Glucose LESS THAN 70 milliGRAM(s)/deciLiter  glucagon  Injectable 1 milliGRAM(s) IntraMuscular once PRN Glucose <70 milliGRAM(s)/deciLiter  haloperidol    Injectable 1 milliGRAM(s) IV Push every 6 hours PRN Agitation          Vital Signs Last 24 Hrs  T(C): 36.9 (23 Oct 2018 19:15), Max: 36.9 (23 Oct 2018 19:15)  T(F): 98.4 (23 Oct 2018 19:15), Max: 98.4 (23 Oct 2018 19:15)  HR: 104 (23 Oct 2018 19:15) (93 - 119)  BP: 124/73 (23 Oct 2018 19:15) (113/79 - 180/88)  BP(mean): --  RR: 18 (23 Oct 2018 19:15) (18 - 20)  SpO2: 95% (23 Oct 2018 19:15) (95% - 100%)    PHYSICAL EXAMINATION:    GENERAL: The patient is awake and alert in no apparent distress.     HEENT: Mucous membranes are moist.    NECK: Supple.    LUNGS: Bilateral breath sounds/rhonchi    HEART: Regular rate    ABDOMEN: Soft, nontender            LABS:                        10.1   17.28 )-----------( 328      ( 23 Oct 2018 08:09 )             30.6     10-22    140  |  107  |  8   ----------------------------<  145<H>  4.2   |  23  |  0.59    Ca    8.9      22 Oct 2018 22:23      PT/INR - ( 23 Oct 2018 08:14 )   PT: 28.7 sec;   INR: 2.49 ratio         PTT - ( 23 Oct 2018 08:14 )  PTT:57.0 sec                    MICROBIOLOGY:      RADIOLOGY & ADDITIONAL STUDIES:

## 2018-10-23 NOTE — PROGRESS NOTE ADULT - SUBJECTIVE AND OBJECTIVE BOX
Interval Events: Pt seen with son at bedside. Pt is without new complaints.   Tolerating dysphagia diet well, no n/v.     MEDICATIONS  (STANDING):  ALBUTerol/ipratropium for Nebulization 3 milliLiter(s) Nebulizer every 6 hours  amLODIPine   Tablet 2.5 milliGRAM(s) Oral daily  dextrose 5%. 1000 milliLiter(s) (50 mL/Hr) IV Continuous <Continuous>  dextrose 50% Injectable 12.5 Gram(s) IV Push once  dextrose 50% Injectable 25 Gram(s) IV Push once  dextrose 50% Injectable 25 Gram(s) IV Push once  levETIRAcetam 500 milliGRAM(s) Oral two times a day  metoprolol tartrate 50 milliGRAM(s) Oral two times a day  nystatin Powder 1 Application(s) Topical every 12 hours  tamsulosin 0.4 milliGRAM(s) Oral at bedtime  warfarin 1 milliGRAM(s) Oral once    MEDICATIONS  (PRN):  acetaminophen   Tablet .. 650 milliGRAM(s) Oral every 6 hours PRN Temp greater or equal to 38C (100.4F)  dextrose 40% Gel 15 Gram(s) Oral once PRN Blood Glucose LESS THAN 70 milliGRAM(s)/deciLiter  glucagon  Injectable 1 milliGRAM(s) IntraMuscular once PRN Glucose <70 milliGRAM(s)/deciLiter  haloperidol    Injectable 1 milliGRAM(s) IV Push every 6 hours PRN Agitation      Allergies    No Known Allergies    Intolerances        Review of Systems:    General:  No wt loss, fevers, chills, night sweats,fatigue,   Eyes:  Good vision, no reported pain  ENT:  No sore throat, pain, runny nose, dysphagia  CV:  No pain, palpitations, hypo/hypertension  Resp:  No dyspnea, cough, tachypnea, wheezing  GI:  No pain, No nausea, No vomiting, No diarrhea, No constipation, No weight loss, No fever, No pruritis, No rectal bleeding, No melena, No dysphagia  :  No pain, bleeding, incontinence, nocturia  Muscle:  No pain, weakness  Neuro:  No weakness, tingling, memory problems  Psych:  No fatigue, insomnia, mood problems, depression  Endocrine:  No polyuria, polydypsia, cold/heat intolerance  Heme:  No petechiae, ecchymosis, easy bruisability  Skin:  No rash, tattoos, scars, edema      Vital Signs Last 24 Hrs  T(C): 36.7 (23 Oct 2018 14:18), Max: 36.8 (22 Oct 2018 22:00)  T(F): 98 (23 Oct 2018 14:18), Max: 98.2 (22 Oct 2018 22:00)  HR: 102 (23 Oct 2018 14:18) (93 - 119)  BP: 113/79 (23 Oct 2018 14:18) (113/79 - 180/88)  BP(mean): --  RR: 18 (23 Oct 2018 14:18) (18 - 20)  SpO2: 98% (23 Oct 2018 14:18) (95% - 100%)    PHYSICAL EXAM:    Constitutional: NAD, well-developed  HEENT: EOMI, throat clear  Neck: No LAD, supple  Respiratory: CTA and P  Cardiovascular: S1 and S2, RRR, no M  Gastrointestinal: BS+, soft, NT/ND, neg HSM,  Extremities: No peripheral edema, neg clubing, cyanosis  Vascular: 2+ peripheral pulses  Neurological: A/O x 3, no focal deficits  Psychiatric: Normal mood, normal affect  Skin: No rashes      LABS:                        10.1   17.28 )-----------( 328      ( 23 Oct 2018 08:09 )             30.6     10-22    140  |  107  |  8   ----------------------------<  145<H>  4.2   |  23  |  0.59    Ca    8.9      22 Oct 2018 22:23      PT/INR - ( 23 Oct 2018 08:14 )   PT: 28.7 sec;   INR: 2.49 ratio         PTT - ( 23 Oct 2018 08:14 )  PTT:57.0 sec      RADIOLOGY & ADDITIONAL TESTS:

## 2018-10-23 NOTE — PROGRESS NOTE ADULT - SUBJECTIVE AND OBJECTIVE BOX
Patient is a 73y old  Female who presents with a chief complaint of Fall, AMS (23 Oct 2018 15:42)    Being followed by ID for leucocytosis    Interval history:feels well  Some  cough but at baseline  no n/v/d  no urinary complaints     No other acute events      ROS:  No SOB,CP  No N/V/D  No abd pain  No urinary complaints  No HA  No joint or limb pain  No other complaints      Antimicrobials:    s/p solumderol 10/17->10/20    other medications reviewed    Vital Signs Last 24 Hrs  T(C): 36.7 (10-23-18 @ 14:18), Max: 36.8 (10-22-18 @ 22:00)  T(F): 98 (10-23-18 @ 14:18), Max: 98.2 (10-22-18 @ 22:00)  HR: 102 (10-23-18 @ 14:18) (93 - 119)  BP: 113/79 (10-23-18 @ 14:18) (113/79 - 180/88)  BP(mean): --  RR: 18 (10-23-18 @ 14:18) (18 - 20)  SpO2: 98% (10-23-18 @ 14:18) (95% - 100%)    Physical Exam:    Constitutional well preserved,comfortable,pleasant    HEENT PERRLA EOMI,No pallor or icterus    No oral exudate or erythema    Neck supple no JVD or LN    Chest Good AE,occ crackles     CVS RRR S1 S2 WNl No murmur or rub or gallop    Abd soft BS normal No tenderness no masses    Ext No cyanosis clubbing or edema    IV site no erythema tenderness or discharge    Joints no swelling or LOM    CNS AAO X 3 left hemiparesis     Lab Data:                          10.1   17.28 )-----------( 328      ( 23 Oct 2018 08:09 )             30.6     WBC Count: 17.28 (10-23-18 @ 08:09)  WBC Count: 15.99 (10-22-18 @ 09:10)  WBC Count: 17.35 (10-21-18 @ 09:04)  WBC Count: 13.2 (10-20-18 @ 07:06)  WBC Count: 19.43 (10-19-18 @ 09:12)  WBC Count: 17.9 (10-18-18 @ 10:11)  WBC Count: 16.19 (10-17-18 @ 09:01)    10-22    140  |  107  |  8   ----------------------------<  145<H>  4.2   |  23  |  0.59    Ca    8.9      22 Oct 2018 22:23            < from: Xray Modified Barium Swallow (10.20.18 @ 09:49) >  FINDINGS/  IMPRESSION: Aspiration with thin liquids without retrieval.    For further details regarding the technique and results of the   examination, please see the speech pathology report.           < end of copied text >

## 2018-10-23 NOTE — PROGRESS NOTE ADULT - ATTENDING COMMENTS
discussed with patient in detail, all questions answered  discussed with family at bedside in detail  d/w   likely discharge to [facility] tomorrow if INR therapeutic

## 2018-10-23 NOTE — PROGRESS NOTE ADULT - SUBJECTIVE AND OBJECTIVE BOX
[facility] [phone]        Patient seen and examined in bed.  She was in good spirits        MEDICATIONS  (STANDING):  ALBUTerol/ipratropium for Nebulization 3 milliLiter(s) Nebulizer every 6 hours  dextrose 5%. 1000 milliLiter(s) (50 mL/Hr) IV Continuous <Continuous>  dextrose 50% Injectable 12.5 Gram(s) IV Push once  dextrose 50% Injectable 25 Gram(s) IV Push once  dextrose 50% Injectable 25 Gram(s) IV Push once  heparin  Infusion. 700 Unit(s)/Hr (7 mL/Hr) IV Continuous <Continuous>  levETIRAcetam 500 milliGRAM(s) Oral two times a day  metoprolol tartrate 50 milliGRAM(s) Oral two times a day  nystatin Powder 1 Application(s) Topical every 12 hours  tamsulosin 0.4 milliGRAM(s) Oral at bedtime      VITAL:  T(C): , Max: 36.8 (10-22-18 @ 22:00)  T(F): , Max: 98.2 (10-22-18 @ 22:00)  HR: 93 (10-23-18 @ 06:44)  BP: 148/78 (10-23-18 @ 06:44)  BP(mean): --  RR: 18 (10-23-18 @ 05:38)  SpO2: 96% (10-23-18 @ 05:38)  Wt(kg): --    I and O's:    10-22 @ 07:01  -  10-23 @ 07:00  --------------------------------------------------------  IN: 808 mL / OUT: 1700 mL / NET: -892 mL          PHYSICAL EXAM:    Constitutional: NAD  Neck:  No JVD  Respiratory: CTAB/L  Cardiovascular: S1 and S2  Gastrointestinal: BS+, soft, NT/ND  Extremities: No peripheral edema  Neurological: A/O x 3, no focal deficits  Psychiatric: Normal mood, normal affect  : No [place]  Skin: No rashes  Access: Not applicable    LABS:                        10.1   17.28 )-----------( 328      ( 23 Oct 2018 08:09 )             30.6     10-22    140  |  107  |  8   ----------------------------<  145<H>  4.2   |  23  |  0.59    Ca    8.9      22 Oct 2018 22:23            Urine Studies:          RADIOLOGY & ADDITIONAL STUDIES:

## 2018-10-23 NOTE — PROGRESS NOTE ADULT - ASSESSMENT
73yearold female with past medical history of hypertension, atrial fibrillation, status post cerebrovascular accident now presents with acute kidney injury.    Post obstruction causing ATN    1.  - soria catheter in place for urinary retention   2. SP  Hyponatremia  - improved  3. Hypokalemia -   4.  Hypertensive urgency    PLAN:  1. Start Flomax and possible TOV soon (n would like her in the chair as well)  2. Off IVF ; Replete potassium today  3. Dysphagia diet and calorie count as well  4. Start Norvasc given high BP trend

## 2018-10-23 NOTE — PROGRESS NOTE ADULT - ASSESSMENT
72 yo woman with h/o COPD,DVT on AC  s/p fall/passing out  ? seizure  s/p respiratory failure -mechanical ventilation  Low grade fever but resolved  Hypoxia improving   CXR clear   s/p antimicrobial course  leucocytosis  No s/s acute infection though at risk for aspiration,also has soria  more like leucocytosis from steroid-lag  would recommend continue observation  Monitor for any s/s infection-if occur workup + antimicrobials  else Will tailor plan for ID issues  per course,results.Will defer to primary team on management of other issues.  case d/w Med NP,Dr moscoso  Will Follow.  Beeper [id number]/afterhours/No response-[id number]

## 2018-10-24 LAB
ANION GAP SERPL CALC-SCNC: 13 MMOL/L — SIGNIFICANT CHANGE UP (ref 5–17)
APPEARANCE UR: ABNORMAL
APTT BLD: 31.6 SEC — SIGNIFICANT CHANGE UP (ref 27.5–37.4)
BILIRUB UR-MCNC: NEGATIVE — SIGNIFICANT CHANGE UP
BUN SERPL-MCNC: 8 MG/DL — SIGNIFICANT CHANGE UP (ref 7–23)
CALCIUM SERPL-MCNC: 8.5 MG/DL — SIGNIFICANT CHANGE UP (ref 8.4–10.5)
CHLORIDE SERPL-SCNC: 102 MMOL/L — SIGNIFICANT CHANGE UP (ref 96–108)
CO2 SERPL-SCNC: 24 MMOL/L — SIGNIFICANT CHANGE UP (ref 22–31)
COLOR SPEC: YELLOW — SIGNIFICANT CHANGE UP
CREAT SERPL-MCNC: 0.7 MG/DL — SIGNIFICANT CHANGE UP (ref 0.5–1.3)
DIFF PNL FLD: NEGATIVE — SIGNIFICANT CHANGE UP
GLUCOSE SERPL-MCNC: 130 MG/DL — HIGH (ref 70–99)
GLUCOSE UR QL: NEGATIVE — SIGNIFICANT CHANGE UP
HCT VFR BLD CALC: 31.1 % — LOW (ref 34.5–45)
HGB BLD-MCNC: 10.2 G/DL — LOW (ref 11.5–15.5)
INR BLD: 2.65 RATIO — HIGH (ref 0.88–1.16)
KETONES UR-MCNC: SIGNIFICANT CHANGE UP
LEUKOCYTE ESTERASE UR-ACNC: ABNORMAL
MAGNESIUM SERPL-MCNC: 1.7 MG/DL — SIGNIFICANT CHANGE UP (ref 1.6–2.6)
MCHC RBC-ENTMCNC: 31.9 PG — SIGNIFICANT CHANGE UP (ref 27–34)
MCHC RBC-ENTMCNC: 32.8 GM/DL — SIGNIFICANT CHANGE UP (ref 32–36)
MCV RBC AUTO: 97.2 FL — SIGNIFICANT CHANGE UP (ref 80–100)
NITRITE UR-MCNC: NEGATIVE — SIGNIFICANT CHANGE UP
PH UR: 6.5 — SIGNIFICANT CHANGE UP (ref 5–8)
PLATELET # BLD AUTO: 338 K/UL — SIGNIFICANT CHANGE UP (ref 150–400)
POTASSIUM SERPL-MCNC: 3.3 MMOL/L — LOW (ref 3.5–5.3)
POTASSIUM SERPL-SCNC: 3.3 MMOL/L — LOW (ref 3.5–5.3)
PROT UR-MCNC: ABNORMAL
PROTHROM AB SERPL-ACNC: 30.6 SEC — HIGH (ref 10–13.1)
RBC # BLD: 3.2 M/UL — LOW (ref 3.8–5.2)
RBC # FLD: 13.9 % — SIGNIFICANT CHANGE UP (ref 10.3–14.5)
SODIUM SERPL-SCNC: 139 MMOL/L — SIGNIFICANT CHANGE UP (ref 135–145)
SP GR SPEC: 1.02 — SIGNIFICANT CHANGE UP (ref 1.01–1.02)
UROBILINOGEN FLD QL: NEGATIVE — SIGNIFICANT CHANGE UP
WBC # BLD: 12.95 K/UL — HIGH (ref 3.8–10.5)
WBC # FLD AUTO: 12.95 K/UL — HIGH (ref 3.8–10.5)

## 2018-10-24 PROCEDURE — 93010 ELECTROCARDIOGRAM REPORT: CPT

## 2018-10-24 PROCEDURE — 99232 SBSQ HOSP IP/OBS MODERATE 35: CPT

## 2018-10-24 PROCEDURE — 93010 ELECTROCARDIOGRAM REPORT: CPT | Mod: 77

## 2018-10-24 PROCEDURE — 71045 X-RAY EXAM CHEST 1 VIEW: CPT | Mod: 26

## 2018-10-24 RX ORDER — NYSTATIN CREAM 100000 [USP'U]/G
1 CREAM TOPICAL
Qty: 0 | Refills: 0 | COMMUNITY
Start: 2018-10-24

## 2018-10-24 RX ORDER — WARFARIN SODIUM 2.5 MG/1
1 TABLET ORAL ONCE
Qty: 0 | Refills: 0 | Status: COMPLETED | OUTPATIENT
Start: 2018-10-24 | End: 2018-10-24

## 2018-10-24 RX ORDER — LEVETIRACETAM 250 MG/1
1 TABLET, FILM COATED ORAL
Qty: 0 | Refills: 0 | COMMUNITY
Start: 2018-10-24

## 2018-10-24 RX ORDER — POTASSIUM CHLORIDE 20 MEQ
40 PACKET (EA) ORAL ONCE
Qty: 0 | Refills: 0 | Status: COMPLETED | OUTPATIENT
Start: 2018-10-24 | End: 2018-10-24

## 2018-10-24 RX ORDER — PIPERACILLIN AND TAZOBACTAM 4; .5 G/20ML; G/20ML
3.38 INJECTION, POWDER, LYOPHILIZED, FOR SOLUTION INTRAVENOUS ONCE
Qty: 0 | Refills: 0 | Status: COMPLETED | OUTPATIENT
Start: 2018-10-24 | End: 2018-10-24

## 2018-10-24 RX ORDER — SODIUM CHLORIDE 9 MG/ML
250 INJECTION INTRAMUSCULAR; INTRAVENOUS; SUBCUTANEOUS ONCE
Qty: 0 | Refills: 0 | Status: COMPLETED | OUTPATIENT
Start: 2018-10-24 | End: 2018-10-24

## 2018-10-24 RX ORDER — PIPERACILLIN AND TAZOBACTAM 4; .5 G/20ML; G/20ML
3.38 INJECTION, POWDER, LYOPHILIZED, FOR SOLUTION INTRAVENOUS EVERY 8 HOURS
Qty: 0 | Refills: 0 | Status: DISCONTINUED | OUTPATIENT
Start: 2018-10-24 | End: 2018-10-25

## 2018-10-24 RX ORDER — WARFARIN SODIUM 2.5 MG/1
0 TABLET ORAL
Qty: 0 | Refills: 0 | COMMUNITY

## 2018-10-24 RX ORDER — TAMSULOSIN HYDROCHLORIDE 0.4 MG/1
1 CAPSULE ORAL
Qty: 0 | Refills: 0 | COMMUNITY
Start: 2018-10-24

## 2018-10-24 RX ORDER — ALBUTEROL 90 UG/1
2 AEROSOL, METERED ORAL
Qty: 0 | Refills: 0 | COMMUNITY

## 2018-10-24 RX ORDER — VANCOMYCIN HCL 1 G
1000 VIAL (EA) INTRAVENOUS ONCE
Qty: 0 | Refills: 0 | Status: COMPLETED | OUTPATIENT
Start: 2018-10-24 | End: 2018-10-24

## 2018-10-24 RX ORDER — POTASSIUM CHLORIDE 20 MEQ
40 PACKET (EA) ORAL ONCE
Qty: 0 | Refills: 0 | Status: DISCONTINUED | OUTPATIENT
Start: 2018-10-24 | End: 2018-10-24

## 2018-10-24 RX ADMIN — LEVETIRACETAM 500 MILLIGRAM(S): 250 TABLET, FILM COATED ORAL at 05:23

## 2018-10-24 RX ADMIN — NYSTATIN CREAM 1 APPLICATION(S): 100000 CREAM TOPICAL at 17:06

## 2018-10-24 RX ADMIN — Medication 3 MILLILITER(S): at 01:51

## 2018-10-24 RX ADMIN — TAMSULOSIN HYDROCHLORIDE 0.4 MILLIGRAM(S): 0.4 CAPSULE ORAL at 21:15

## 2018-10-24 RX ADMIN — SODIUM CHLORIDE 500 MILLILITER(S): 9 INJECTION INTRAMUSCULAR; INTRAVENOUS; SUBCUTANEOUS at 17:05

## 2018-10-24 RX ADMIN — Medication 40 MILLIEQUIVALENT(S): at 13:29

## 2018-10-24 RX ADMIN — Medication 3 MILLILITER(S): at 17:05

## 2018-10-24 RX ADMIN — LEVETIRACETAM 500 MILLIGRAM(S): 250 TABLET, FILM COATED ORAL at 17:06

## 2018-10-24 RX ADMIN — SODIUM CHLORIDE 250 MILLILITER(S): 9 INJECTION INTRAMUSCULAR; INTRAVENOUS; SUBCUTANEOUS at 21:18

## 2018-10-24 RX ADMIN — Medication 3 MILLILITER(S): at 05:22

## 2018-10-24 RX ADMIN — WARFARIN SODIUM 1 MILLIGRAM(S): 2.5 TABLET ORAL at 21:45

## 2018-10-24 RX ADMIN — Medication 3 MILLILITER(S): at 11:38

## 2018-10-24 RX ADMIN — PIPERACILLIN AND TAZOBACTAM 200 GRAM(S): 4; .5 INJECTION, POWDER, LYOPHILIZED, FOR SOLUTION INTRAVENOUS at 23:00

## 2018-10-24 RX ADMIN — AMLODIPINE BESYLATE 2.5 MILLIGRAM(S): 2.5 TABLET ORAL at 05:25

## 2018-10-24 RX ADMIN — NYSTATIN CREAM 1 APPLICATION(S): 100000 CREAM TOPICAL at 05:24

## 2018-10-24 RX ADMIN — Medication 250 MILLIGRAM(S): at 21:12

## 2018-10-24 NOTE — PROGRESS NOTE ADULT - SUBJECTIVE AND OBJECTIVE BOX
Neurology F/u Note    S: Pt mildly agitated today, looking forward to going to rehab. No further seizures since starting keppra, no adverse effects.     MEDICATIONS  (STANDING):  ALBUTerol/ipratropium for Nebulization 3 milliLiter(s) Nebulizer every 6 hours  amLODIPine   Tablet 2.5 milliGRAM(s) Oral daily  dextrose 5%. 1000 milliLiter(s) (50 mL/Hr) IV Continuous <Continuous>  dextrose 50% Injectable 12.5 Gram(s) IV Push once  dextrose 50% Injectable 25 Gram(s) IV Push once  dextrose 50% Injectable 25 Gram(s) IV Push once  levETIRAcetam 500 milliGRAM(s) Oral two times a day  nystatin Powder 1 Application(s) Topical every 12 hours  tamsulosin 0.4 milliGRAM(s) Oral at bedtime    MEDICATIONS  (PRN):  acetaminophen   Tablet .. 650 milliGRAM(s) Oral every 6 hours PRN Temp greater or equal to 38C (100.4F)  dextrose 40% Gel 15 Gram(s) Oral once PRN Blood Glucose LESS THAN 70 milliGRAM(s)/deciLiter  glucagon  Injectable 1 milliGRAM(s) IntraMuscular once PRN Glucose <70 milliGRAM(s)/deciLiter      Allergies    No Known Allergies    Intolerances    MEDICATIONS  (STANDING):  ALBUTerol/ipratropium for Nebulization 3 milliLiter(s) Nebulizer every 6 hours  amLODIPine   Tablet 2.5 milliGRAM(s) Oral daily  dextrose 5%. 1000 milliLiter(s) (50 mL/Hr) IV Continuous <Continuous>  dextrose 50% Injectable 12.5 Gram(s) IV Push once  dextrose 50% Injectable 25 Gram(s) IV Push once  dextrose 50% Injectable 25 Gram(s) IV Push once  levETIRAcetam 500 milliGRAM(s) Oral two times a day  nystatin Powder 1 Application(s) Topical every 12 hours  tamsulosin 0.4 milliGRAM(s) Oral at bedtime    MEDICATIONS  (PRN):  acetaminophen   Tablet .. 650 milliGRAM(s) Oral every 6 hours PRN Temp greater or equal to 38C (100.4F)  dextrose 40% Gel 15 Gram(s) Oral once PRN Blood Glucose LESS THAN 70 milliGRAM(s)/deciLiter  glucagon  Injectable 1 milliGRAM(s) IntraMuscular once PRN Glucose <70 milliGRAM(s)/deciLiter      General Exam:   General appearance: No acute distress                   Neurological Exam:  Mental Status: Awake and alert, speech fluent    Cranial Nerves:  PERRL, EOMI, VFF, face symmetric    Motor: Left UE contracted (chronic), LLE 3/5     Sensation: Intact to LT throughout      Labs:                        10.2   12.95 )-----------( 338      ( 24 Oct 2018 09:14 )             31.1   10-24    139  |  102  |  8   ----------------------------<  130<H>  3.3<L>   |  24  |  0.70    Ca    8.5      24 Oct 2018 07:23  Mg     1.7     10-24    Radiology  < from: CT Head No Cont (10.10.18 @ 16:24) >    IMPRESSION: Age-appropriate involutional and ischemic gliotic changes.   Old right basal ganglia ischemic changes unchanged from 12:51 PM. No   hemorrhage.    < end of copied text >

## 2018-10-24 NOTE — PROGRESS NOTE ADULT - ASSESSMENT
72 y/o woman PMH of DM, COPD, prior stroke with left sided weakness at baseline presenting after unwitnessed fall. Patient was reportedly found on the ground with hematoma to left periorbital area. Family member noticed that pt started shaking whole body which lasted 1 minute and after she was confused for about 10-15 minutes. In the ED, she was back to baseline but then had another witnessed seizure and was intubated and loaded on Keppra. No previous seizure activity. CTH reveals old R BG ischemic infarct.     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 Sanchez at [address] [place] [address] [phone] for further management

## 2018-10-24 NOTE — CHART NOTE - NSCHARTNOTEFT_GEN_A_CORE
Request from Dr. Styles to facilitate patient discharge.  Medication reconciliation reviewed, revised, and resolved with Dr. Styles, who has medically cleared patient for discharge with followup as advised.  Please refer to discharge note for detailed hospital course.

## 2018-10-24 NOTE — PROGRESS NOTE ADULT - SUBJECTIVE AND OBJECTIVE BOX
Interval Events: Pt seen with son at bedside  Tolerating dysphagia diet well, no n/v.     MEDICATIONS  (STANDING):  ALBUTerol/ipratropium for Nebulization 3 milliLiter(s) Nebulizer every 6 hours  amLODIPine   Tablet 2.5 milliGRAM(s) Oral daily  dextrose 5%. 1000 milliLiter(s) (50 mL/Hr) IV Continuous <Continuous>  dextrose 50% Injectable 12.5 Gram(s) IV Push once  dextrose 50% Injectable 25 Gram(s) IV Push once  dextrose 50% Injectable 25 Gram(s) IV Push once  levETIRAcetam 500 milliGRAM(s) Oral two times a day  nystatin Powder 1 Application(s) Topical every 12 hours  potassium chloride   Solution 40 milliEquivalent(s) Oral once  tamsulosin 0.4 milliGRAM(s) Oral at bedtime    MEDICATIONS  (PRN):  acetaminophen   Tablet .. 650 milliGRAM(s) Oral every 6 hours PRN Temp greater or equal to 38C (100.4F)  dextrose 40% Gel 15 Gram(s) Oral once PRN Blood Glucose LESS THAN 70 milliGRAM(s)/deciLiter  glucagon  Injectable 1 milliGRAM(s) IntraMuscular once PRN Glucose <70 milliGRAM(s)/deciLiter      Allergies    No Known Allergies    Intolerances        Review of Systems:    General:  No wt loss, fevers, chills, night sweats,fatigue,   Eyes:  Good vision, no reported pain  ENT:  No sore throat, pain, runny nose, dysphagia  CV:  No pain, palpitations, hypo/hypertension  Resp:  No dyspnea, cough, tachypnea, wheezing  GI:  No pain, No nausea, No vomiting, No diarrhea, No constipation, No weight loss, No fever, No pruritis, No rectal bleeding, No melena, No dysphagia  :  No pain, bleeding, incontinence, nocturia  Muscle:  No pain, weakness  Neuro:  No weakness, tingling, memory problems  Psych:  No fatigue, insomnia, mood problems, depression  Endocrine:  No polyuria, polydypsia, cold/heat intolerance  Heme:  No petechiae, ecchymosis, easy bruisability  Skin:  No rash, tattoos, scars, edema      Vital Signs Last 24 Hrs  T(C): 36.6 (24 Oct 2018 10:20), Max: 37.1 (24 Oct 2018 05:13)  T(F): 97.8 (24 Oct 2018 10:20), Max: 98.7 (24 Oct 2018 05:13)  HR: 104 (24 Oct 2018 10:20) (102 - 108)  BP: 117/72 (24 Oct 2018 10:20) (109/70 - 148/66)  BP(mean): --  RR: 18 (24 Oct 2018 10:20) (18 - 18)  SpO2: 93% (24 Oct 2018 10:20) (93% - 98%)    PHYSICAL EXAM:    Constitutional: NAD, well-developed  HEENT: EOMI, throat clear  Neck: No LAD, supple  Respiratory: CTA and P  Cardiovascular: S1 and S2, RRR, no M  Gastrointestinal: BS+, soft, NT/ND, neg HSM,  Extremities: No peripheral edema, neg clubing, cyanosis  Vascular: 2+ peripheral pulses  Neurological: A/O x 3, no focal deficits  Psychiatric: Normal mood, normal affect  Skin: No rashes      LABS:                        10.2   12.95 )-----------( 338      ( 24 Oct 2018 09:14 )             31.1     10-24    139  |  102  |  8   ----------------------------<  130<H>  3.3<L>   |  24  |  0.70    Ca    8.5      24 Oct 2018 07:23  Mg     1.7     10-24      PT/INR - ( 24 Oct 2018 09:21 )   PT: 30.6 sec;   INR: 2.65 ratio         PTT - ( 24 Oct 2018 09:21 )  PTT:31.6 sec      RADIOLOGY & ADDITIONAL TESTS:

## 2018-10-24 NOTE — PROGRESS NOTE ADULT - ASSESSMENT
73yearold female with past medical history of hypertension, atrial fibrillation, status post cerebrovascular accident now presents with acute kidney injury.    Post obstruction causing ATN--resolving    1.  - soria catheter in place for urinary retention   2. SP  Hyponatremia  - improved  3. Hypokalemia -   4.  Hypertension    PLAN:  1. Cont Flomax and TOV done;    2. Off IVF ;  3. Dysphagia diet    4. BP has improved p the norvasc

## 2018-10-24 NOTE — PROGRESS NOTE ADULT - SUBJECTIVE AND OBJECTIVE BOX
NEPHROLOGY-[facility] [phone]        Patient seen and examined in bed.  She had a uneventful night        MEDICATIONS  (STANDING):  ALBUTerol/ipratropium for Nebulization 3 milliLiter(s) Nebulizer every 6 hours  amLODIPine   Tablet 2.5 milliGRAM(s) Oral daily  dextrose 5%. 1000 milliLiter(s) (50 mL/Hr) IV Continuous <Continuous>  dextrose 50% Injectable 12.5 Gram(s) IV Push once  dextrose 50% Injectable 25 Gram(s) IV Push once  dextrose 50% Injectable 25 Gram(s) IV Push once  levETIRAcetam 500 milliGRAM(s) Oral two times a day  metoprolol tartrate 50 milliGRAM(s) Oral two times a day  nystatin Powder 1 Application(s) Topical every 12 hours  tamsulosin 0.4 milliGRAM(s) Oral at bedtime      VITAL:  T(C): , Max: 37.1 (10-24-18 @ 05:13)  T(F): , Max: 98.7 (10-24-18 @ 05:13)  HR: 108 (10-24-18 @ 05:13)  BP: 109/70 (10-24-18 @ 05:13)  BP(mean): --  RR: 18 (10-24-18 @ 05:13)  SpO2: 96% (10-24-18 @ 05:13)  Wt(kg): --    I and O's:    10-23 @ 07:01  -  10-24 @ 07:00  --------------------------------------------------------  IN: 1020 mL / OUT: 550 mL / NET: 470 mL          PHYSICAL EXAM:    Constitutional: NAD; cachetic  Neck:  No JVD  Respiratory: CTAB/L  Cardiovascular: S1 and S2  Gastrointestinal: BS+, soft, NT/ND  Extremities: No peripheral edema  Neurological: A/O x 3, no focal deficits  Psychiatric: Normal mood, normal affect  : No [place]  Skin: No rashes  Access: Not applicable    LABS:                        10.1   17.28 )-----------( 328      ( 23 Oct 2018 08:09 )             30.6     10-24    139  |  102  |  8   ----------------------------<  130<H>  3.3<L>   |  24  |  0.70    Ca    8.5      24 Oct 2018 07:23  Mg     1.7     10-24            Urine Studies:          RADIOLOGY & ADDITIONAL STUDIES:

## 2018-10-24 NOTE — PROGRESS NOTE ADULT - SUBJECTIVE AND OBJECTIVE BOX
Patient is a 73y old  Female who presents with a chief complaint of Fall, AMS (24 Oct 2018 11:55)      SUBJECTIVE / OVERNIGHT EVENTS: no overnight events    ROS:  Resp: No cough no sputum production  CVS: No chest pain no palpitations no orthopnea  GI: no N/V/D  : no dysuria, no hematuria  Neuro: no weakness no paresthesias  Heme: No petechiae no easy bruising  Msk: No joint pain no swelling  Skin: No rash no itching        MEDICATIONS  (STANDING):  ALBUTerol/ipratropium for Nebulization 3 milliLiter(s) Nebulizer every 6 hours  amLODIPine   Tablet 2.5 milliGRAM(s) Oral daily  dextrose 5%. 1000 milliLiter(s) (50 mL/Hr) IV Continuous <Continuous>  dextrose 50% Injectable 12.5 Gram(s) IV Push once  dextrose 50% Injectable 25 Gram(s) IV Push once  dextrose 50% Injectable 25 Gram(s) IV Push once  levETIRAcetam 500 milliGRAM(s) Oral two times a day  nystatin Powder 1 Application(s) Topical every 12 hours  potassium chloride   Solution 40 milliEquivalent(s) Oral once  tamsulosin 0.4 milliGRAM(s) Oral at bedtime    MEDICATIONS  (PRN):  acetaminophen   Tablet .. 650 milliGRAM(s) Oral every 6 hours PRN Temp greater or equal to 38C (100.4F)  dextrose 40% Gel 15 Gram(s) Oral once PRN Blood Glucose LESS THAN 70 milliGRAM(s)/deciLiter  glucagon  Injectable 1 milliGRAM(s) IntraMuscular once PRN Glucose <70 milliGRAM(s)/deciLiter        CAPILLARY BLOOD GLUCOSE        I&O's Summary    23 Oct 2018 07:01  -  24 Oct 2018 07:00  --------------------------------------------------------  IN: 1020 mL / OUT: 550 mL / NET: 470 mL        Vital Signs Last 24 Hrs  T(C): 36.6 (24 Oct 2018 10:20), Max: 37.1 (24 Oct 2018 05:13)  T(F): 97.8 (24 Oct 2018 10:20), Max: 98.7 (24 Oct 2018 05:13)  HR: 104 (24 Oct 2018 10:20) (102 - 108)  BP: 117/72 (24 Oct 2018 10:20) (109/70 - 148/66)  BP(mean): --  RR: 18 (24 Oct 2018 10:20) (18 - 18)  SpO2: 93% (24 Oct 2018 10:20) (93% - 98%)    PHYSICAL EXAM:  GENERAL: NAD, asthenic  sitting in bedside chair  HEAD:  left facial ecchymoses resolving, Normocephalic  EYES: EOMI, PERRLA, conjunctiva and sclera clear  NECK: Supple, No JVD  CHEST/LUNG: no rhonchi, no expiratory wheeze  HEART: S1 S2; No rubs or gallops, no murmurs appreciated  ABDOMEN: Soft, Nontender, Nondistended; Bowel sounds present  EXTREMITIES:  No clubbing or cyanosis, + Peripheral Pulses,  no edema  PSYCH: Alert,   NEUROLOGY: left UE contracted since CVA  left leg 2 - 3/5 power  SKIN: No rashes or lesions    LABS:                        10.2   12.95 )-----------( 338      ( 24 Oct 2018 09:14 )             31.1     10-24    139  |  102  |  8   ----------------------------<  130<H>  3.3<L>   |  24  |  0.70    Ca    8.5      24 Oct 2018 07:23  Mg     1.7     10-24      PT/INR - ( 24 Oct 2018 09:21 )   PT: 30.6 sec;   INR: 2.65 ratio         PTT - ( 24 Oct 2018 09:21 )  PTT:31.6 sec            All consultant(s) notes reviewed and care discussed with other providers    Contact Number, Dr Styles [phone]

## 2018-10-24 NOTE — PROGRESS NOTE ADULT - PROBLEM SELECTOR PLAN 2
Neuro recommendations appreciated  continue Keppra 500 BID  seizures on admission likely secondary to sequelae of old CVA

## 2018-10-24 NOTE — CHART NOTE - NSCHARTNOTEFT_GEN_A_CORE
Medicine [facility]  [id number]     Notified by RN for HR 130s. Patient with PMHx afib. Patient has been tachycardic today and was switched to cardizem 30mg q 6 hr. Patient seen and evaluated at bedside in NAD. Patient states she feels "fine" denies any CP/SOB/N/V/D/headache/weakness or abdominal pain.    Vital Signs Last 24 Hrs  T(C): 37 (10-24-18 @ 19:40), Max: 37.1 (10-24-18 @ 05:13)  T(F): 98.6 (10-24-18 @ 19:40), Max: 98.7 (10-24-18 @ 05:13)  HR: 124 (10-24-18 @ 23:31) (104 - 128)  BP: 140/67 (10-24-18 @ 19:40) (100/66 - 140/67)  BP(mean): --  RR: 18 (10-24-18 @ 19:40) (18 - 18)  SpO2: 98% (10-24-18 @ 19:40) (93% - 99%)  Daily     Daily Weight in k.8 (24 Oct 2018 12:01)  I&O's Summary    23 Oct 2018 07:  -  24 Oct 2018 07:00  --------------------------------------------------------  IN: 1020 mL / OUT: 550 mL / NET: 470 mL    24 Oct 2018 07:01  -  24 Oct 2018 23:35  --------------------------------------------------------  IN: 120 mL / OUT: 500 mL / NET: -380 mL      CAPILLARY BLOOD GLUCOSE                          10.2   12.95 )-----------( 338      ( 24 Oct 2018 09:14 )             31.1     10-24    139  |  102  |  8   ----------------------------<  130<H>  3.3<L>   |  24  |  0.70    Ca    8.5      24 Oct 2018 07:23  Mg     1.7     10-24      PT/INR - ( 24 Oct 2018 09:21 )   PT: 30.6 sec;   INR: 2.65 ratio         PTT - ( 24 Oct 2018 09:21 )  PTT:31.6 sec        PHYSICAL EXAM:  GENERAL: NAD,   CHEST/LUNG: Clear to auscultation bilaterally;   HEART: Regular rate and rhythm tachycardic   ABDOMEN: Soft, Nontender, Nondistended; Bowel sounds present      Assessment/Plan: HPI: Tachycardia 130s afebrile with leukocytosis r/o infectious etiology   - EKG preformed showing sinus tachycardia  stable from EKG done on 10/16.   - D/W Dr. Styles, patient pancx'd to r/o infectious etiology, blood cx x2 /uA / ucx / CXR (f/u offical read)/ Vanc x 1 and zosyn q 8hr initiated for presumed asa pneum per Dr. Faith instruction   - tachycardic, afebrile with leukocytosis, given a 250cc bolus -- > HR improved to 115-120s asymptomatic   - f/u cultures and official read for CXR   - will continue to monitor   - will endorse to AM team       Ovidio Zhu PA-C,   Department of Medicine

## 2018-10-24 NOTE — PROGRESS NOTE ADULT - ASSESSMENT
74 yo woman with h/o COPD,DVT on AC  s/p fall/passing out  ? seizure  s/p respiratory failure -mechanical ventilation  Low grade fever but resolved  Hypoxia improving   CXR clear   s/p antimicrobial course  leucocytosis  No s/s acute infection though at risk for aspiration,also has soria  more like leucocytosis from steroid-lag  Improving  stable off abx  Continue care per primary team  aspiration precautions  Case d/w Med NP  ID will follow as needed,please call [phone] if any questions or issues.

## 2018-10-24 NOTE — PROGRESS NOTE ADULT - SUBJECTIVE AND OBJECTIVE BOX
Patient is a 73y old  Female who presents with a chief complaint of Fall, AMS (24 Oct 2018 09:18)    Being followed by ID for leucocytosis     Interval history:feels well  No acute events      ROS:  minimal  cough, no sputum no SOB,CP  No N/V/D./abd pain  No other complaints      Antimicrobials:None       Other medications reviewed    Vital Signs Last 24 Hrs  T(C): 36.6 (10-24-18 @ 10:20), Max: 37.1 (10-24-18 @ 05:13)  T(F): 97.8 (10-24-18 @ 10:20), Max: 98.7 (10-24-18 @ 05:13)  HR: 104 (10-24-18 @ 10:20) (102 - 108)  BP: 117/72 (10-24-18 @ 10:20) (109/70 - 148/66)  BP(mean): --  RR: 18 (10-24-18 @ 10:20) (18 - 18)  SpO2: 93% (10-24-18 @ 10:20) (93% - 100%)    Physical Exam:        HEENT PERRLA EOMI    No oral exudate or erythema    Chest Good AE,occ crackles     CVS RRR S1 S2 WNl No murmur or rub or gallop    Abd soft BS normal No tenderness no masses    IV site no erythema tenderness or discharge    CNS AAO X 3 no focal    Lab Data:                          10.2   12.95 )-----------( 338      ( 24 Oct 2018 09:14 )             31.1   WBC Count: 12.95 (10-24-18 @ 09:14)  WBC Count: 17.28 (10-23-18 @ 08:09)  WBC Count: 15.99 (10-22-18 @ 09:10)  WBC Count: 17.35 (10-21-18 @ 09:04)  WBC Count: 13.2 (10-20-18 @ 07:06)  WBC Count: 19.43 (10-19-18 @ 09:12)  WBC Count: 17.9 (10-18-18 @ 10:11)      10-24    139  |  102  |  8   ----------------------------<  130<H>  3.3<L>   |  24  |  0.70    Ca    8.5      24 Oct 2018 07:23  Mg     1.7     10-24

## 2018-10-24 NOTE — PROGRESS NOTE ADULT - PROBLEM SELECTOR PLAN 2
- post obstruction cause ATN  - soria catheter in place for urinary retention    - care per nephrology appreciated

## 2018-10-25 DIAGNOSIS — R00.0 TACHYCARDIA, UNSPECIFIED: ICD-10-CM

## 2018-10-25 LAB
ANION GAP SERPL CALC-SCNC: 11 MMOL/L — SIGNIFICANT CHANGE UP (ref 5–17)
APPEARANCE UR: ABNORMAL
BILIRUB UR-MCNC: NEGATIVE — SIGNIFICANT CHANGE UP
BUN SERPL-MCNC: 25 MG/DL — HIGH (ref 7–23)
CALCIUM SERPL-MCNC: 8.6 MG/DL — SIGNIFICANT CHANGE UP (ref 8.4–10.5)
CHLORIDE SERPL-SCNC: 103 MMOL/L — SIGNIFICANT CHANGE UP (ref 96–108)
CO2 SERPL-SCNC: 23 MMOL/L — SIGNIFICANT CHANGE UP (ref 22–31)
COLOR SPEC: YELLOW — SIGNIFICANT CHANGE UP
CREAT SERPL-MCNC: 0.91 MG/DL — SIGNIFICANT CHANGE UP (ref 0.5–1.3)
DIFF PNL FLD: ABNORMAL
GLUCOSE SERPL-MCNC: 89 MG/DL — SIGNIFICANT CHANGE UP (ref 70–99)
GLUCOSE UR QL: NEGATIVE MG/DL — SIGNIFICANT CHANGE UP
HCT VFR BLD CALC: 30 % — LOW (ref 34.5–45)
HGB BLD-MCNC: 9.7 G/DL — LOW (ref 11.5–15.5)
INR BLD: 1.04 RATIO — SIGNIFICANT CHANGE UP (ref 0.88–1.16)
INR BLD: 2.93 RATIO — HIGH (ref 0.88–1.16)
KETONES UR-MCNC: ABNORMAL
LEUKOCYTE ESTERASE UR-ACNC: ABNORMAL
MCHC RBC-ENTMCNC: 28.4 PG — SIGNIFICANT CHANGE UP (ref 27–34)
MCHC RBC-ENTMCNC: 32.3 GM/DL — SIGNIFICANT CHANGE UP (ref 32–36)
MCV RBC AUTO: 88 FL — SIGNIFICANT CHANGE UP (ref 80–100)
NITRITE UR-MCNC: NEGATIVE — SIGNIFICANT CHANGE UP
PH UR: 6.5 — SIGNIFICANT CHANGE UP (ref 5–8)
PLATELET # BLD AUTO: 349 K/UL — SIGNIFICANT CHANGE UP (ref 150–400)
POTASSIUM SERPL-MCNC: 3.5 MMOL/L — SIGNIFICANT CHANGE UP (ref 3.5–5.3)
POTASSIUM SERPL-SCNC: 3.5 MMOL/L — SIGNIFICANT CHANGE UP (ref 3.5–5.3)
PROT UR-MCNC: 30 MG/DL
PROTHROM AB SERPL-ACNC: 11.8 SEC — SIGNIFICANT CHANGE UP (ref 10–13.1)
PROTHROM AB SERPL-ACNC: 32.4 SEC — HIGH (ref 9.8–12.7)
RBC # BLD: 3.41 M/UL — LOW (ref 3.8–5.2)
RBC # FLD: 13.7 % — SIGNIFICANT CHANGE UP (ref 10.3–14.5)
SODIUM SERPL-SCNC: 137 MMOL/L — SIGNIFICANT CHANGE UP (ref 135–145)
SP GR SPEC: 1.02 — SIGNIFICANT CHANGE UP (ref 1.01–1.02)
UROBILINOGEN FLD QL: NEGATIVE MG/DL — SIGNIFICANT CHANGE UP
WBC # BLD: 7.99 K/UL — SIGNIFICANT CHANGE UP (ref 3.8–10.5)
WBC # FLD AUTO: 7.99 K/UL — SIGNIFICANT CHANGE UP (ref 3.8–10.5)

## 2018-10-25 RX ORDER — SODIUM CHLORIDE 9 MG/ML
1000 INJECTION INTRAMUSCULAR; INTRAVENOUS; SUBCUTANEOUS
Qty: 0 | Refills: 0 | Status: DISCONTINUED | OUTPATIENT
Start: 2018-10-25 | End: 2018-10-30

## 2018-10-25 RX ORDER — SODIUM CHLORIDE 9 MG/ML
250 INJECTION INTRAMUSCULAR; INTRAVENOUS; SUBCUTANEOUS ONCE
Qty: 0 | Refills: 0 | Status: COMPLETED | OUTPATIENT
Start: 2018-10-25 | End: 2018-10-25

## 2018-10-25 RX ADMIN — PIPERACILLIN AND TAZOBACTAM 25 GRAM(S): 4; .5 INJECTION, POWDER, LYOPHILIZED, FOR SOLUTION INTRAVENOUS at 07:56

## 2018-10-25 RX ADMIN — SODIUM CHLORIDE 50 MILLILITER(S): 9 INJECTION INTRAMUSCULAR; INTRAVENOUS; SUBCUTANEOUS at 12:46

## 2018-10-25 RX ADMIN — TAMSULOSIN HYDROCHLORIDE 0.4 MILLIGRAM(S): 0.4 CAPSULE ORAL at 22:51

## 2018-10-25 RX ADMIN — NYSTATIN CREAM 1 APPLICATION(S): 100000 CREAM TOPICAL at 17:28

## 2018-10-25 RX ADMIN — Medication 3 MILLILITER(S): at 23:21

## 2018-10-25 RX ADMIN — Medication 3 MILLILITER(S): at 05:03

## 2018-10-25 RX ADMIN — SODIUM CHLORIDE 500 MILLILITER(S): 9 INJECTION INTRAMUSCULAR; INTRAVENOUS; SUBCUTANEOUS at 11:40

## 2018-10-25 RX ADMIN — Medication 3 MILLILITER(S): at 11:44

## 2018-10-25 RX ADMIN — Medication 3 MILLILITER(S): at 17:27

## 2018-10-25 RX ADMIN — NYSTATIN CREAM 1 APPLICATION(S): 100000 CREAM TOPICAL at 05:03

## 2018-10-25 RX ADMIN — LEVETIRACETAM 500 MILLIGRAM(S): 250 TABLET, FILM COATED ORAL at 17:28

## 2018-10-25 RX ADMIN — LEVETIRACETAM 500 MILLIGRAM(S): 250 TABLET, FILM COATED ORAL at 05:02

## 2018-10-25 NOTE — PROVIDER CONTACT NOTE (OTHER) - BACKGROUND
Pt with frequent loose BMs
Patient admitted for convulsions
Pt admitted for convulsion. Pt estella dc'd 10/23/18
Pt admitted for convulsion. pt on coumadin with Hx afib

## 2018-10-25 NOTE — PROVIDER CONTACT NOTE (OTHER) - ASSESSMENT
A+Ox4 forgetful
pt is asymptomatic. No acute distress noted.
pt resting no signs of acute distress noted.
Patient aox4
Pt alert and oriented x 2-3, incontinent of urine
Pt denies chest pain and sob. Pt restless in bed. Coughing at times

## 2018-10-25 NOTE — PROGRESS NOTE ADULT - SUBJECTIVE AND OBJECTIVE BOX
PULM/MED PROGRESS NOTE:      Resting comfortably in bed.   Notes some coughing at times; no significant phlegm        MEDICATIONS  (STANDING):  ALBUTerol/ipratropium for Nebulization 3 milliLiter(s) Nebulizer every 6 hours  dextrose 5%. 1000 milliLiter(s) (50 mL/Hr) IV Continuous <Continuous>  dextrose 50% Injectable 12.5 Gram(s) IV Push once  dextrose 50% Injectable 25 Gram(s) IV Push once  dextrose 50% Injectable 25 Gram(s) IV Push once  diltiazem    Tablet 30 milliGRAM(s) Oral every 6 hours  levETIRAcetam 500 milliGRAM(s) Oral two times a day  nystatin Powder 1 Application(s) Topical every 12 hours  piperacillin/tazobactam IVPB. 3.375 Gram(s) IV Intermittent every 8 hours  sodium chloride 0.9%. 1000 milliLiter(s) (50 mL/Hr) IV Continuous <Continuous>  tamsulosin 0.4 milliGRAM(s) Oral at bedtime      MEDICATIONS  (PRN):  acetaminophen   Tablet .. 650 milliGRAM(s) Oral every 6 hours PRN Temp greater or equal to 38C (100.4F)  dextrose 40% Gel 15 Gram(s) Oral once PRN Blood Glucose LESS THAN 70 milliGRAM(s)/deciLiter  glucagon  Injectable 1 milliGRAM(s) IntraMuscular once PRN Glucose <70 milliGRAM(s)/deciLiter          Vital Signs Last 24 Hrs  T(C): 37 (25 Oct 2018 12:50), Max: 37 (24 Oct 2018 19:40)  T(F): 98.6 (25 Oct 2018 12:50), Max: 98.6 (24 Oct 2018 19:40)  HR: 114 (25 Oct 2018 12:50) (100 - 124)  BP: 112/67 (25 Oct 2018 12:50) (97/61 - 140/67)  BP(mean): --  RR: 18 (25 Oct 2018 12:50) (18 - 18)  SpO2: 98% (25 Oct 2018 12:50) (97% - 98%)    PHYSICAL EXAMINATION:    GENERAL: The patient is awake  in no apparent distress.     NECK: Supple.    LUNGS: Bilateral breath sounds; some rhonchi; respirations unlabored    HEART: Regular rate and rhythm without murmur.    ABDOMEN: Soft, nontender    EXTREMITIES: Without edema.        LABS:                        9.7    7.99  )-----------( 349      ( 25 Oct 2018 09:15 )             30.0     10-25    137  |  103  |  25<H>  ----------------------------<  89  3.5   |  23  |  0.91    Ca    8.6      25 Oct 2018 07:09  Mg     1.7     10-24      PT/INR - ( 25 Oct 2018 09:17 )   PT: 11.8 sec;   INR: 1.04 ratio         PTT - ( 24 Oct 2018 09:21 )  PTT:31.6 sec  Urinalysis Basic - ( 24 Oct 2018 22:40 )    Color: x / Appearance: x / SG: x / pH: x  Gluc: x / Ketone: x  / Bili: x / Urobili: x   Blood: x / Protein: x / Nitrite: x   Leuk Esterase: x / RBC: 20 /HPF /  /HPF   Sq Epi: x / Non Sq Epi: 0 /HPF / Bacteria: Few        RADIOLOGY & ADDITIONAL STUDIES:      EXAM:  XR CHEST PORTABLE URGENT 1V                            PROCEDURE DATE:  10/24/2018            INTERPRETATION:  CLINICAL INFORMATION: Tachycardia, evaluate for   infection.    TECHNIQUE: Single AP radiograph of the chest dated 10/24/2018.    COMPARISON: Chest radiograph dated 10/18/2018.    FINDINGS:  The lungs are clear.  No pleural effusions or pneumothorax.  Heart size is within normal limits.   No acute osseous abnormality.    IMPRESSION: Clear lungs.                CECILIO ELIZABETH M.D., RADIOLOGY RESIDENT  This document has been electronically signed.  KOKI ARIZA M.D., ATTENDING RADIOLOGIST  This document has been electronically signed. Oct 25 2018 12:47PM

## 2018-10-25 NOTE — PROGRESS NOTE ADULT - SUBJECTIVE AND OBJECTIVE BOX
Patient is a 73y old  Female who presents with a chief complaint of Fall, AMS (25 Oct 2018 16:05)      SUBJECTIVE / OVERNIGHT EVENTS: overnight events noted  tachy and this AM orthostatic    ROS:  Resp: No cough no sputum production  CVS: No chest pain no palpitations no orthopnea  GI: no N/V/D  : no dysuria, no hematuria  Neuro: no weakness no paresthesias  Heme: No petechiae no easy bruising  Msk: No joint pain no swelling  Skin: No rash no itching        MEDICATIONS  (STANDING):  ALBUTerol/ipratropium for Nebulization 3 milliLiter(s) Nebulizer every 6 hours  dextrose 5%. 1000 milliLiter(s) (50 mL/Hr) IV Continuous <Continuous>  dextrose 50% Injectable 12.5 Gram(s) IV Push once  dextrose 50% Injectable 25 Gram(s) IV Push once  dextrose 50% Injectable 25 Gram(s) IV Push once  diltiazem    Tablet 30 milliGRAM(s) Oral every 6 hours  levETIRAcetam 500 milliGRAM(s) Oral two times a day  nystatin Powder 1 Application(s) Topical every 12 hours  piperacillin/tazobactam IVPB. 3.375 Gram(s) IV Intermittent every 8 hours  sodium chloride 0.9%. 1000 milliLiter(s) (50 mL/Hr) IV Continuous <Continuous>  tamsulosin 0.4 milliGRAM(s) Oral at bedtime    MEDICATIONS  (PRN):  acetaminophen   Tablet .. 650 milliGRAM(s) Oral every 6 hours PRN Temp greater or equal to 38C (100.4F)  dextrose 40% Gel 15 Gram(s) Oral once PRN Blood Glucose LESS THAN 70 milliGRAM(s)/deciLiter  glucagon  Injectable 1 milliGRAM(s) IntraMuscular once PRN Glucose <70 milliGRAM(s)/deciLiter        CAPILLARY BLOOD GLUCOSE        I&O's Summary    24 Oct 2018 07:01  -  25 Oct 2018 07:00  --------------------------------------------------------  IN: 900 mL / OUT: 1600 mL / NET: -700 mL    25 Oct 2018 07:01  -  25 Oct 2018 16:21  --------------------------------------------------------  IN: 240 mL / OUT: 250 mL / NET: -10 mL        Vital Signs Last 24 Hrs  T(C): 37 (25 Oct 2018 12:50), Max: 37 (24 Oct 2018 19:40)  T(F): 98.6 (25 Oct 2018 12:50), Max: 98.6 (24 Oct 2018 19:40)  HR: 114 (25 Oct 2018 12:50) (100 - 124)  BP: 112/67 (25 Oct 2018 12:50) (97/61 - 140/67)  BP(mean): --  RR: 18 (25 Oct 2018 12:50) (18 - 18)  SpO2: 98% (25 Oct 2018 12:50) (97% - 98%)    GENERAL: NAD, asthenic  sitting in bedside chair  HEAD:  left facial ecchymoses resolving, Normocephalic  EYES: EOMI, PERRLA, conjunctiva and sclera clear  NECK: Supple, No JVD  CHEST/LUNG: no rhonchi, no expiratory wheeze  HEART: S1 S2; No rubs or gallops, no murmurs appreciated  ABDOMEN: Soft, Nontender, Nondistended; Bowel sounds present  EXTREMITIES:  No clubbing or cyanosis, + Peripheral Pulses,  no edema  PSYCH: Alert,   NEUROLOGY: left UE contracted since CVA  left leg 2 - 3/5 power  SKIN: No rashes or lesions    LABS:                        9.7    7.99  )-----------( 349      ( 25 Oct 2018 09:15 )             30.0     10-25    137  |  103  |  25<H>  ----------------------------<  89  3.5   |  23  |  0.91    Ca    8.6      25 Oct 2018 07:09  Mg     1.7     10-24      PT/INR - ( 25 Oct 2018 09:17 )   PT: 11.8 sec;   INR: 1.04 ratio         PTT - ( 24 Oct 2018 09:21 )  PTT:31.6 sec      Urinalysis Basic - ( 24 Oct 2018 22:40 )    Color: x / Appearance: x / SG: x / pH: x  Gluc: x / Ketone: x  / Bili: x / Urobili: x   Blood: x / Protein: x / Nitrite: x   Leuk Esterase: x / RBC: 20 /HPF /  /HPF   Sq Epi: x / Non Sq Epi: 0 /HPF / Bacteria: Few          All consultant(s) notes reviewed and care discussed with other providers    Contact Number, Dr Styles [phone]

## 2018-10-25 NOTE — PROVIDER CONTACT NOTE (OTHER) - NAME OF MD/NP/PA/DO NOTIFIED:
MATILDE López
NP Zeta Armida
Dr. Matthew Acosta
JOB Zhu
JOB Zhu
MATILDE Cat
MATILDE Swayer
MATILDE borden
MATLIDE Cat

## 2018-10-25 NOTE — PROGRESS NOTE ADULT - ASSESSMENT
Pt on Zosyn as of 10/24 - WBC appears to be improving (now down to 7.99)  Continue bronchodilators  Will discuss with Dr. Styles

## 2018-10-25 NOTE — PROGRESS NOTE ADULT - SUBJECTIVE AND OBJECTIVE BOX
Interval Events: Pt seen and examined with aid at bedside.   Tolerating PO well, no new complaints.     MEDICATIONS  (STANDING):  ALBUTerol/ipratropium for Nebulization 3 milliLiter(s) Nebulizer every 6 hours  dextrose 5%. 1000 milliLiter(s) (50 mL/Hr) IV Continuous <Continuous>  dextrose 50% Injectable 12.5 Gram(s) IV Push once  dextrose 50% Injectable 25 Gram(s) IV Push once  dextrose 50% Injectable 25 Gram(s) IV Push once  diltiazem    Tablet 30 milliGRAM(s) Oral every 6 hours  levETIRAcetam 500 milliGRAM(s) Oral two times a day  nystatin Powder 1 Application(s) Topical every 12 hours  piperacillin/tazobactam IVPB. 3.375 Gram(s) IV Intermittent every 8 hours  sodium chloride 0.9%. 1000 milliLiter(s) (50 mL/Hr) IV Continuous <Continuous>  tamsulosin 0.4 milliGRAM(s) Oral at bedtime    MEDICATIONS  (PRN):  acetaminophen   Tablet .. 650 milliGRAM(s) Oral every 6 hours PRN Temp greater or equal to 38C (100.4F)  dextrose 40% Gel 15 Gram(s) Oral once PRN Blood Glucose LESS THAN 70 milliGRAM(s)/deciLiter  glucagon  Injectable 1 milliGRAM(s) IntraMuscular once PRN Glucose <70 milliGRAM(s)/deciLiter      Allergies    No Known Allergies    Intolerances        Review of Systems:    General:  No wt loss, fevers, chills, night sweats,fatigue,   Eyes:  Good vision, no reported pain  ENT:  No sore throat, pain, runny nose, dysphagia  CV:  No pain, palpitations, hypo/hypertension  Resp:  No dyspnea, cough, tachypnea, wheezing  GI:  No pain, No nausea, No vomiting, No diarrhea, No constipation, No weight loss, No fever, No pruritis, No rectal bleeding, No melena, No dysphagia  :  No pain, bleeding, incontinence, nocturia  Muscle:  No pain, weakness  Neuro:  No weakness, tingling, memory problems  Psych:  No fatigue, insomnia, mood problems, depression  Endocrine:  No polyuria, polydypsia, cold/heat intolerance  Heme:  No petechiae, ecchymosis, easy bruisability  Skin:  No rash, tattoos, scars, edema      Vital Signs Last 24 Hrs  T(C): 36.8 (25 Oct 2018 04:39), Max: 37 (24 Oct 2018 19:40)  T(F): 98.3 (25 Oct 2018 04:39), Max: 98.6 (24 Oct 2018 19:40)  HR: 100 (25 Oct 2018 11:15) (100 - 128)  BP: 97/61 (25 Oct 2018 11:15) (97/61 - 140/67)  BP(mean): --  RR: 18 (25 Oct 2018 04:39) (18 - 18)  SpO2: 97% (25 Oct 2018 11:15) (97% - 99%)    PHYSICAL EXAM:    Constitutional: NAD, well-developed  HEENT: EOMI, throat clear  Neck: No LAD, supple  Respiratory: CTA and P  Cardiovascular: S1 and S2, RRR, no M  Gastrointestinal: BS+, soft, NT/ND, neg HSM,  Extremities: No peripheral edema, neg clubing, cyanosis  Vascular: 2+ peripheral pulses  Neurological: A/O x 3, no focal deficits  Psychiatric: Normal mood, normal affect  Skin: No rashes      LABS:                        9.7    7.99  )-----------( 349      ( 25 Oct 2018 09:15 )             30.0     10-25    137  |  103  |  25<H>  ----------------------------<  89  3.5   |  23  |  0.91    Ca    8.6      25 Oct 2018 07:09  Mg     1.7     10-24      PT/INR - ( 25 Oct 2018 09:17 )   PT: 11.8 sec;   INR: 1.04 ratio         PTT - ( 24 Oct 2018 09:21 )  PTT:31.6 sec  Urinalysis Basic - ( 24 Oct 2018 22:40 )    Color: x / Appearance: x / SG: x / pH: x  Gluc: x / Ketone: x  / Bili: x / Urobili: x   Blood: x / Protein: x / Nitrite: x   Leuk Esterase: x / RBC: 20 /HPF /  /HPF   Sq Epi: x / Non Sq Epi: 0 /HPF / Bacteria: Few        RADIOLOGY & ADDITIONAL TESTS:

## 2018-10-25 NOTE — PROVIDER CONTACT NOTE (OTHER) - DATE AND TIME:
16-Oct-2018 09:05
16-Oct-2018 17:02
16-Oct-2018 17:15
16-Oct-2018 18:49
15-Oct-2018 17:00
17-Oct-2018 00:50
22-Oct-2018 20:52
23-Oct-2018 01:17
23-Oct-2018 02:02
24-Oct-2018 05:20
24-Oct-2018 20:00

## 2018-10-25 NOTE — PROVIDER CONTACT NOTE (OTHER) - SITUATION
Cleo from [facility] was unable to do ABG.
RN asking for activity order
pt restless and change of mentation at this time. pt does answer name, , place, time and situation correctly, then speaks inappropriately. pt restless at this time.
pts /79.118,18,92% 98.1   repeat VS done manually 168/74,120. pt denies HA, chest pain at this time.
. Follow up vital signs after diltiazem was given
Patient hypertensive with manual /80 heart rate fluctuating in 90s to 100s
Pt bladder scanned noted 327ml
Stat BMP ordered, several unsuccessful attempts made to get blood.
pt  and /88
pt update for last BP

## 2018-10-25 NOTE — PROVIDER CONTACT NOTE (OTHER) - RECOMMENDATIONS
continue to monitor
continue to monitor
place lab slip in phlebotomy and continue to monitor pt
Continue plan of care
Continue to monitor pt. Provide hydration
continue to monitor pt. Emotional support provided, for restlessness

## 2018-10-26 DIAGNOSIS — R05 COUGH: ICD-10-CM

## 2018-10-26 LAB
ANION GAP SERPL CALC-SCNC: 13 MMOL/L — SIGNIFICANT CHANGE UP (ref 5–17)
BUN SERPL-MCNC: <4 MG/DL — LOW (ref 7–23)
CALCIUM SERPL-MCNC: 8.4 MG/DL — SIGNIFICANT CHANGE UP (ref 8.4–10.5)
CHLORIDE SERPL-SCNC: 105 MMOL/L — SIGNIFICANT CHANGE UP (ref 96–108)
CO2 SERPL-SCNC: 23 MMOL/L — SIGNIFICANT CHANGE UP (ref 22–31)
CREAT SERPL-MCNC: 0.61 MG/DL — SIGNIFICANT CHANGE UP (ref 0.5–1.3)
CULTURE RESULTS: SIGNIFICANT CHANGE UP
GLUCOSE SERPL-MCNC: 135 MG/DL — HIGH (ref 70–99)
HCT VFR BLD CALC: 29.7 % — LOW (ref 34.5–45)
HGB BLD-MCNC: 9.8 G/DL — LOW (ref 11.5–15.5)
INR BLD: 2.36 RATIO — HIGH (ref 0.88–1.16)
MAGNESIUM SERPL-MCNC: 1.5 MG/DL — LOW (ref 1.6–2.6)
MCHC RBC-ENTMCNC: 31.5 PG — SIGNIFICANT CHANGE UP (ref 27–34)
MCHC RBC-ENTMCNC: 33 GM/DL — SIGNIFICANT CHANGE UP (ref 32–36)
MCV RBC AUTO: 95.5 FL — SIGNIFICANT CHANGE UP (ref 80–100)
PLATELET # BLD AUTO: 330 K/UL — SIGNIFICANT CHANGE UP (ref 150–400)
POTASSIUM SERPL-MCNC: 3.4 MMOL/L — LOW (ref 3.5–5.3)
POTASSIUM SERPL-SCNC: 3.4 MMOL/L — LOW (ref 3.5–5.3)
PROTHROM AB SERPL-ACNC: 27.1 SEC — HIGH (ref 10–13.1)
RBC # BLD: 3.11 M/UL — LOW (ref 3.8–5.2)
RBC # FLD: 14.2 % — SIGNIFICANT CHANGE UP (ref 10.3–14.5)
SODIUM SERPL-SCNC: 141 MMOL/L — SIGNIFICANT CHANGE UP (ref 135–145)
SPECIMEN SOURCE: SIGNIFICANT CHANGE UP
WBC # BLD: 10.62 K/UL — HIGH (ref 3.8–10.5)
WBC # FLD AUTO: 10.62 K/UL — HIGH (ref 3.8–10.5)

## 2018-10-26 PROCEDURE — 99232 SBSQ HOSP IP/OBS MODERATE 35: CPT

## 2018-10-26 RX ORDER — POTASSIUM CHLORIDE 20 MEQ
40 PACKET (EA) ORAL ONCE
Qty: 0 | Refills: 0 | Status: COMPLETED | OUTPATIENT
Start: 2018-10-26 | End: 2018-10-26

## 2018-10-26 RX ORDER — MAGNESIUM SULFATE 500 MG/ML
2 VIAL (ML) INJECTION ONCE
Qty: 0 | Refills: 0 | Status: COMPLETED | OUTPATIENT
Start: 2018-10-26 | End: 2018-10-26

## 2018-10-26 RX ORDER — WARFARIN SODIUM 2.5 MG/1
1 TABLET ORAL ONCE
Qty: 0 | Refills: 0 | Status: COMPLETED | OUTPATIENT
Start: 2018-10-26 | End: 2018-10-26

## 2018-10-26 RX ORDER — POTASSIUM CHLORIDE 20 MEQ
40 PACKET (EA) ORAL ONCE
Qty: 0 | Refills: 0 | Status: DISCONTINUED | OUTPATIENT
Start: 2018-10-26 | End: 2018-10-26

## 2018-10-26 RX ADMIN — LEVETIRACETAM 500 MILLIGRAM(S): 250 TABLET, FILM COATED ORAL at 17:14

## 2018-10-26 RX ADMIN — LEVETIRACETAM 500 MILLIGRAM(S): 250 TABLET, FILM COATED ORAL at 05:52

## 2018-10-26 RX ADMIN — NYSTATIN CREAM 1 APPLICATION(S): 100000 CREAM TOPICAL at 05:52

## 2018-10-26 RX ADMIN — TAMSULOSIN HYDROCHLORIDE 0.4 MILLIGRAM(S): 0.4 CAPSULE ORAL at 21:53

## 2018-10-26 RX ADMIN — Medication 3 MILLILITER(S): at 17:13

## 2018-10-26 RX ADMIN — Medication 3 MILLILITER(S): at 05:52

## 2018-10-26 RX ADMIN — Medication 3 MILLILITER(S): at 11:26

## 2018-10-26 RX ADMIN — Medication 40 MILLIEQUIVALENT(S): at 12:46

## 2018-10-26 RX ADMIN — SODIUM CHLORIDE 50 MILLILITER(S): 9 INJECTION INTRAMUSCULAR; INTRAVENOUS; SUBCUTANEOUS at 21:48

## 2018-10-26 RX ADMIN — Medication 50 GRAM(S): at 20:36

## 2018-10-26 RX ADMIN — WARFARIN SODIUM 1 MILLIGRAM(S): 2.5 TABLET ORAL at 21:53

## 2018-10-26 RX ADMIN — NYSTATIN CREAM 1 APPLICATION(S): 100000 CREAM TOPICAL at 17:14

## 2018-10-26 NOTE — PROGRESS NOTE ADULT - SUBJECTIVE AND OBJECTIVE BOX
Patient is a 73y old  Female who presents with a chief complaint of Fall, AMS (26 Oct 2018 11:40)    Being followed by ID for leucocytosis     Interval history:some cough  no sputum   Had orthostasis       ROS:  No,SOB,CP  No N/V/D./abd pain  No urinary complaints   No other complaints      Antimicrobials:  none     Other medications reviewed    Vital Signs Last 24 Hrs  T(C): 36.6 (10-26-18 @ 10:00), Max: 37 (10-25-18 @ 12:50)  T(F): 97.9 (10-26-18 @ 10:00), Max: 98.6 (10-25-18 @ 12:50)  HR: 100 (10-26-18 @ 10:00) (100 - 121)  BP: 108/67 (10-26-18 @ 10:00) (108/67 - 142/83)  BP(mean): --  RR: 19 (10-26-18 @ 10:00) (18 - 19)  SpO2: 95% (10-26-18 @ 10:00) (95% - 98%)    Physical Exam:        HEENT PERRLA EOMI    No oral exudate or erythema    Chest Good AE,bibasilar rhonchi     CVS RRR S1 S2 WNl No murmur or rub or gallop    Abd soft BS normal No tenderness no masses    IV site no erythema tenderness or discharge    CNS AAO X 3 no focal    Lab Data:                          9.8    10.62 )-----------( 330      ( 26 Oct 2018 07:57 )             29.7       10-26    141  |  105  |  <4<L>  ----------------------------<  135<H>  3.4<L>   |  23  |  0.61    Ca    8.4      26 Oct 2018 06:42  Mg     1.5     10-26          Culture - Blood (collected 24 Oct 2018 23:07)  Source: .Blood Blood-Peripheral  Preliminary Report (26 Oct 2018 01:01):    No growth to date.    Culture - Blood (collected 24 Oct 2018 23:07)  Source: .Blood Blood-Peripheral  Preliminary Report (26 Oct 2018 01:01):    No growth to date.    Culture - Urine (collected 24 Oct 2018 22:43)  Source: .Urine Clean Catch (Midstream)  Preliminary Report (25 Oct 2018 22:38):    50,000 - 99,000 CFU/mL Yeast like cells        < from: Xray Chest 1 View- PORTABLE-Urgent (10.24.18 @ 21:10) >  IMPRESSION: Clear lungs.    < end of copied text >

## 2018-10-26 NOTE — PROGRESS NOTE ADULT - SUBJECTIVE AND OBJECTIVE BOX
Interval Events: Pt s/e  Pt denies abdominal pain, n/v. Tolerating dysphagia diet  complains of cough     MEDICATIONS  (STANDING):  ALBUTerol/ipratropium for Nebulization 3 milliLiter(s) Nebulizer every 6 hours  dextrose 5%. 1000 milliLiter(s) (50 mL/Hr) IV Continuous <Continuous>  dextrose 50% Injectable 12.5 Gram(s) IV Push once  dextrose 50% Injectable 25 Gram(s) IV Push once  dextrose 50% Injectable 25 Gram(s) IV Push once  diltiazem    Tablet 30 milliGRAM(s) Oral every 6 hours  levETIRAcetam 500 milliGRAM(s) Oral two times a day  nystatin Powder 1 Application(s) Topical every 12 hours  sodium chloride 0.9%. 1000 milliLiter(s) (50 mL/Hr) IV Continuous <Continuous>  tamsulosin 0.4 milliGRAM(s) Oral at bedtime    MEDICATIONS  (PRN):  acetaminophen   Tablet .. 650 milliGRAM(s) Oral every 6 hours PRN Temp greater or equal to 38C (100.4F)  dextrose 40% Gel 15 Gram(s) Oral once PRN Blood Glucose LESS THAN 70 milliGRAM(s)/deciLiter  glucagon  Injectable 1 milliGRAM(s) IntraMuscular once PRN Glucose <70 milliGRAM(s)/deciLiter      Allergies    No Known Allergies    Intolerances        Review of Systems:    General:  No wt loss, fevers, chills, night sweats,fatigue,   Eyes:  Good vision, no reported pain  ENT:  No sore throat, pain, runny nose, dysphagia  CV:  No pain, palpitations, hypo/hypertension  Resp:  No dyspnea, + cough, tachypnea, wheezing  GI:  No pain, No nausea, No vomiting, No diarrhea, No constipation, No weight loss, No fever, No pruritis, No rectal bleeding, No melena, No dysphagia  :  No pain, bleeding, incontinence, nocturia  Muscle:  No pain, weakness  Neuro:  No weakness, tingling, memory problems  Psych:  No fatigue, insomnia, mood problems, depression  Endocrine:  No polyuria, polydypsia, cold/heat intolerance  Heme:  No petechiae, ecchymosis, easy bruisability  Skin:  No rash, tattoos, scars, edema      Vital Signs Last 24 Hrs  T(C): 36.6 (26 Oct 2018 10:00), Max: 37 (25 Oct 2018 12:50)  T(F): 97.9 (26 Oct 2018 10:00), Max: 98.6 (25 Oct 2018 12:50)  HR: 100 (26 Oct 2018 10:00) (100 - 121)  BP: 108/67 (26 Oct 2018 10:00) (108/67 - 142/83)  BP(mean): --  RR: 19 (26 Oct 2018 10:00) (18 - 19)  SpO2: 95% (26 Oct 2018 10:00) (95% - 98%)    PHYSICAL EXAM:    Constitutional: NAD, well-developed  HEENT: EOMI, throat clear  Neck: No LAD, supple  Respiratory: CTA and P  Cardiovascular: S1 and S2, RRR, no M  Gastrointestinal: BS+, soft, NT/ND, neg HSM,  Extremities: No peripheral edema, neg clubing, cyanosis  Vascular: 2+ peripheral pulses  Neurological: A/O x 3, no focal deficits  Psychiatric: Normal mood, normal affect  Skin: No rashes      LABS:                        9.8    10.62 )-----------( 330      ( 26 Oct 2018 07:57 )             29.7     10-26    141  |  105  |  <4<L>  ----------------------------<  135<H>  3.4<L>   |  23  |  0.61    Ca    8.4      26 Oct 2018 06:42  Mg     1.5     10-26      PT/INR - ( 26 Oct 2018 07:53 )   PT: 27.1 sec;   INR: 2.36 ratio           Urinalysis Basic - ( 24 Oct 2018 22:40 )    Color: x / Appearance: x / SG: x / pH: x  Gluc: x / Ketone: x  / Bili: x / Urobili: x   Blood: x / Protein: x / Nitrite: x   Leuk Esterase: x / RBC: 20 /HPF /  /HPF   Sq Epi: x / Non Sq Epi: 0 /HPF / Bacteria: Few        RADIOLOGY & ADDITIONAL TESTS:

## 2018-10-26 NOTE — PROGRESS NOTE ADULT - SUBJECTIVE AND OBJECTIVE BOX
PULM/MED PROGRESS NOTE:      awake, alert comfortable.  Breathing comfortably; some coughing at times      MEDICATIONS  (STANDING):  ALBUTerol/ipratropium for Nebulization 3 milliLiter(s) Nebulizer every 6 hours  dextrose 5%. 1000 milliLiter(s) (50 mL/Hr) IV Continuous <Continuous>  dextrose 50% Injectable 12.5 Gram(s) IV Push once  dextrose 50% Injectable 25 Gram(s) IV Push once  dextrose 50% Injectable 25 Gram(s) IV Push once  diltiazem    Tablet 30 milliGRAM(s) Oral every 6 hours  levETIRAcetam 500 milliGRAM(s) Oral two times a day  nystatin Powder 1 Application(s) Topical every 12 hours  sodium chloride 0.9%. 1000 milliLiter(s) (50 mL/Hr) IV Continuous <Continuous>  tamsulosin 0.4 milliGRAM(s) Oral at bedtime  warfarin 1 milliGRAM(s) Oral once      MEDICATIONS  (PRN):  acetaminophen   Tablet .. 650 milliGRAM(s) Oral every 6 hours PRN Temp greater or equal to 38C (100.4F)  dextrose 40% Gel 15 Gram(s) Oral once PRN Blood Glucose LESS THAN 70 milliGRAM(s)/deciLiter  glucagon  Injectable 1 milliGRAM(s) IntraMuscular once PRN Glucose <70 milliGRAM(s)/deciLiter          Vital Signs Last 24 Hrs  T(C): 36.3 (26 Oct 2018 19:47), Max: 37.1 (26 Oct 2018 16:38)  T(F): 97.3 (26 Oct 2018 19:47), Max: 98.7 (26 Oct 2018 16:38)  HR: 110 (26 Oct 2018 19:47) (100 - 118)  BP: 121/81 (26 Oct 2018 19:47) (108/67 - 142/83)  BP(mean): --  RR: 18 (26 Oct 2018 19:47) (18 - 19)  SpO2: 95% (26 Oct 2018 19:47) (95% - 97%)    PHYSICAL EXAMINATION:    GENERAL: The patient is awake and alert in no apparent distress.     NECK: Supple.    LUNGS: Bilateral rhonchi; respirations unlabored    HEART: S1S2     ABDOMEN: Soft, nontender    EXTREMITIES: Without  edema.        LABS:                        9.8    10.62 )-----------( 330      ( 26 Oct 2018 07:57 )             29.7     10-26    141  |  105  |  <4<L>  ----------------------------<  135<H>  3.4<L>   |  23  |  0.61    Ca    8.4      26 Oct 2018 06:42  Mg     1.5     10-26      PT/INR - ( 26 Oct 2018 07:53 )   PT: 27.1 sec;   INR: 2.36 ratio           Urinalysis Basic - ( 24 Oct 2018 22:40 )    Color: x / Appearance: x / SG: x / pH: x  Gluc: x / Ketone: x  / Bili: x / Urobili: x   Blood: x / Protein: x / Nitrite: x   Leuk Esterase: x / RBC: 20 /HPF /  /HPF   Sq Epi: x / Non Sq Epi: 0 /HPF / Bacteria: Few                      MICROBIOLOGY:  Culture Results:   No growth to date. (10-24 @ 23:07)  Culture Results:   No growth to date. (10-24 @ 23:07)  Culture Results:   50,000 - 99,000 CFU/mL Yeast like cells (10-24 @ 22:43)      RADIOLOGY & ADDITIONAL STUDIES:

## 2018-10-26 NOTE — PROGRESS NOTE ADULT - SUBJECTIVE AND OBJECTIVE BOX
Patient is a 73y old  Female who presents with a chief complaint of Fall, AMS (26 Oct 2018 11:27)      SUBJECTIVE / OVERNIGHT EVENTS: no overnight events    ROS:  Resp: No cough no sputum production  CVS: No chest pain no palpitations no orthopnea  GI: no N/V/D  : no dysuria, no hematuria  Neuro: no weakness no paresthesias  Heme: No petechiae no easy bruising  Msk: No joint pain no swelling  Skin: No rash no itching        MEDICATIONS  (STANDING):  ALBUTerol/ipratropium for Nebulization 3 milliLiter(s) Nebulizer every 6 hours  dextrose 5%. 1000 milliLiter(s) (50 mL/Hr) IV Continuous <Continuous>  dextrose 50% Injectable 12.5 Gram(s) IV Push once  dextrose 50% Injectable 25 Gram(s) IV Push once  dextrose 50% Injectable 25 Gram(s) IV Push once  diltiazem    Tablet 30 milliGRAM(s) Oral every 6 hours  levETIRAcetam 500 milliGRAM(s) Oral two times a day  nystatin Powder 1 Application(s) Topical every 12 hours  sodium chloride 0.9%. 1000 milliLiter(s) (50 mL/Hr) IV Continuous <Continuous>  tamsulosin 0.4 milliGRAM(s) Oral at bedtime    MEDICATIONS  (PRN):  acetaminophen   Tablet .. 650 milliGRAM(s) Oral every 6 hours PRN Temp greater or equal to 38C (100.4F)  dextrose 40% Gel 15 Gram(s) Oral once PRN Blood Glucose LESS THAN 70 milliGRAM(s)/deciLiter  glucagon  Injectable 1 milliGRAM(s) IntraMuscular once PRN Glucose <70 milliGRAM(s)/deciLiter        CAPILLARY BLOOD GLUCOSE        I&O's Summary    25 Oct 2018 07:01  -  26 Oct 2018 07:00  --------------------------------------------------------  IN: 1240 mL / OUT: 1200 mL / NET: 40 mL        Vital Signs Last 24 Hrs  T(C): 36.6 (26 Oct 2018 10:00), Max: 37 (25 Oct 2018 12:50)  T(F): 97.9 (26 Oct 2018 10:00), Max: 98.6 (25 Oct 2018 12:50)  HR: 100 (26 Oct 2018 10:00) (100 - 121)  BP: 108/67 (26 Oct 2018 10:00) (108/67 - 142/83)  BP(mean): --  RR: 19 (26 Oct 2018 10:00) (18 - 19)  SpO2: 95% (26 Oct 2018 10:00) (95% - 98%)    GENERAL: NAD, asthenic  sitting in bedside chair  HEAD:  left facial ecchymoses resolving, Normocephalic  EYES: EOMI, PERRLA, conjunctiva and sclera clear  NECK: Supple, No JVD  CHEST/LUNG: no rhonchi, no expiratory wheeze  HEART: S1 S2; No rubs or gallops, no murmurs appreciated  ABDOMEN: Soft, Nontender, Nondistended; Bowel sounds present  EXTREMITIES:  No clubbing or cyanosis, + Peripheral Pulses,  no edema  PSYCH: Alert,   NEUROLOGY: left UE contracted since CVA  left leg 2 - 3/5 power  SKIN: No rashes or lesions    LABS:                        9.8    10.62 )-----------( 330      ( 26 Oct 2018 07:57 )             29.7     10-26    141  |  105  |  <4<L>  ----------------------------<  135<H>  3.4<L>   |  23  |  0.61    Ca    8.4      26 Oct 2018 06:42  Mg     1.5     10-26      PT/INR - ( 26 Oct 2018 07:53 )   PT: 27.1 sec;   INR: 2.36 ratio               Urinalysis Basic - ( 24 Oct 2018 22:40 )    Color: x / Appearance: x / SG: x / pH: x  Gluc: x / Ketone: x  / Bili: x / Urobili: x   Blood: x / Protein: x / Nitrite: x   Leuk Esterase: x / RBC: 20 /HPF /  /HPF   Sq Epi: x / Non Sq Epi: 0 /HPF / Bacteria: Few          All consultant(s) notes reviewed and care discussed with other providers    Contact Number, Dr Styles [phone]

## 2018-10-26 NOTE — PROGRESS NOTE ADULT - ASSESSMENT
74 yo woman with h/o COPD,DVT on AC  s/p fall/passing out  ? seizure  s/p respiratory failure -mechanical ventilation  s/p Rx for pna  Off abx  Stable  Orthostasis  Candiduria-but no urinary symptoms or other s/s UTI-? colonization/contamination  continue observation off abx  Other plan per primary  case d/w Med NP  ID will follow as needed,please call [phone] if any questions or issues.

## 2018-10-26 NOTE — PROGRESS NOTE ADULT - ASSESSMENT
Resp status rel stable  Cough may be related to dysphagia  Continue dysphagia diet  Continue bronchodilators.

## 2018-10-27 DIAGNOSIS — E44.0 MODERATE PROTEIN-CALORIE MALNUTRITION: ICD-10-CM

## 2018-10-27 LAB
ANION GAP SERPL CALC-SCNC: 11 MMOL/L — SIGNIFICANT CHANGE UP (ref 5–17)
BASOPHILS # BLD AUTO: 0.03 K/UL — SIGNIFICANT CHANGE UP (ref 0–0.2)
BASOPHILS NFR BLD AUTO: 0.3 % — SIGNIFICANT CHANGE UP (ref 0–2)
BUN SERPL-MCNC: <4 MG/DL — LOW (ref 7–23)
CALCIUM SERPL-MCNC: 8.3 MG/DL — LOW (ref 8.4–10.5)
CHLORIDE SERPL-SCNC: 106 MMOL/L — SIGNIFICANT CHANGE UP (ref 96–108)
CO2 SERPL-SCNC: 24 MMOL/L — SIGNIFICANT CHANGE UP (ref 22–31)
CREAT SERPL-MCNC: 0.62 MG/DL — SIGNIFICANT CHANGE UP (ref 0.5–1.3)
EOSINOPHIL # BLD AUTO: 0.35 K/UL — SIGNIFICANT CHANGE UP (ref 0–0.5)
EOSINOPHIL NFR BLD AUTO: 3.6 % — SIGNIFICANT CHANGE UP (ref 0–6)
GLUCOSE SERPL-MCNC: 117 MG/DL — HIGH (ref 70–99)
HCT VFR BLD CALC: 27.6 % — LOW (ref 34.5–45)
HGB BLD-MCNC: 9.2 G/DL — LOW (ref 11.5–15.5)
IMM GRANULOCYTES NFR BLD AUTO: 0.2 % — SIGNIFICANT CHANGE UP (ref 0–1.5)
INR BLD: 1.82 RATIO — HIGH (ref 0.88–1.16)
LYMPHOCYTES # BLD AUTO: 1.1 K/UL — SIGNIFICANT CHANGE UP (ref 1–3.3)
LYMPHOCYTES # BLD AUTO: 11.3 % — LOW (ref 13–44)
MAGNESIUM SERPL-MCNC: 2 MG/DL — SIGNIFICANT CHANGE UP (ref 1.6–2.6)
MCHC RBC-ENTMCNC: 32.3 PG — SIGNIFICANT CHANGE UP (ref 27–34)
MCHC RBC-ENTMCNC: 33.3 GM/DL — SIGNIFICANT CHANGE UP (ref 32–36)
MCV RBC AUTO: 96.8 FL — SIGNIFICANT CHANGE UP (ref 80–100)
MONOCYTES # BLD AUTO: 0.82 K/UL — SIGNIFICANT CHANGE UP (ref 0–0.9)
MONOCYTES NFR BLD AUTO: 8.4 % — SIGNIFICANT CHANGE UP (ref 2–14)
NEUTROPHILS # BLD AUTO: 7.41 K/UL — HIGH (ref 1.8–7.4)
NEUTROPHILS NFR BLD AUTO: 76.2 % — SIGNIFICANT CHANGE UP (ref 43–77)
PHOSPHATE SERPL-MCNC: 2.6 MG/DL — SIGNIFICANT CHANGE UP (ref 2.5–4.5)
PLATELET # BLD AUTO: 312 K/UL — SIGNIFICANT CHANGE UP (ref 150–400)
POTASSIUM SERPL-MCNC: 3.4 MMOL/L — LOW (ref 3.5–5.3)
POTASSIUM SERPL-SCNC: 3.4 MMOL/L — LOW (ref 3.5–5.3)
PROTHROM AB SERPL-ACNC: 20.8 SEC — HIGH (ref 10–13.1)
RBC # BLD: 2.85 M/UL — LOW (ref 3.8–5.2)
RBC # FLD: 14.2 % — SIGNIFICANT CHANGE UP (ref 10.3–14.5)
SODIUM SERPL-SCNC: 141 MMOL/L — SIGNIFICANT CHANGE UP (ref 135–145)
WBC # BLD: 9.73 K/UL — SIGNIFICANT CHANGE UP (ref 3.8–10.5)
WBC # FLD AUTO: 9.73 K/UL — SIGNIFICANT CHANGE UP (ref 3.8–10.5)

## 2018-10-27 RX ADMIN — LEVETIRACETAM 500 MILLIGRAM(S): 250 TABLET, FILM COATED ORAL at 17:17

## 2018-10-27 RX ADMIN — SODIUM CHLORIDE 50 MILLILITER(S): 9 INJECTION INTRAMUSCULAR; INTRAVENOUS; SUBCUTANEOUS at 22:56

## 2018-10-27 RX ADMIN — TAMSULOSIN HYDROCHLORIDE 0.4 MILLIGRAM(S): 0.4 CAPSULE ORAL at 22:56

## 2018-10-27 RX ADMIN — NYSTATIN CREAM 1 APPLICATION(S): 100000 CREAM TOPICAL at 17:22

## 2018-10-27 RX ADMIN — Medication 3 MILLILITER(S): at 11:33

## 2018-10-27 RX ADMIN — Medication 3 MILLILITER(S): at 01:10

## 2018-10-27 RX ADMIN — Medication 3 MILLILITER(S): at 22:56

## 2018-10-27 RX ADMIN — Medication 3 MILLILITER(S): at 17:17

## 2018-10-27 RX ADMIN — LEVETIRACETAM 500 MILLIGRAM(S): 250 TABLET, FILM COATED ORAL at 06:22

## 2018-10-27 RX ADMIN — NYSTATIN CREAM 1 APPLICATION(S): 100000 CREAM TOPICAL at 06:22

## 2018-10-27 RX ADMIN — Medication 3 MILLILITER(S): at 06:22

## 2018-10-27 NOTE — PROGRESS NOTE ADULT - PROBLEM SELECTOR PLAN 2
poor po intake  will need PEG  d/w family  will schedule next week  d/w GI Mother  Still living? Unknown  Family history of colorectal cancer, Age at diagnosis: Age Unknown

## 2018-10-27 NOTE — PROGRESS NOTE ADULT - SUBJECTIVE AND OBJECTIVE BOX
Patient is a 73y old  Female who presents with a chief complaint of Fall, AMS (26 Oct 2018 21:07)      SUBJECTIVE / OVERNIGHT EVENTS: no overnight events    ROS:  Resp: No cough no sputum production  CVS: No chest pain no palpitations no orthopnea  GI: no N/V/D  : no dysuria, no hematuria  Neuro: no weakness no paresthesias  Heme: No petechiae no easy bruising  Msk: No joint pain no swelling  Skin: No rash no itching        MEDICATIONS  (STANDING):  ALBUTerol/ipratropium for Nebulization 3 milliLiter(s) Nebulizer every 6 hours  dextrose 5%. 1000 milliLiter(s) (50 mL/Hr) IV Continuous <Continuous>  dextrose 50% Injectable 12.5 Gram(s) IV Push once  dextrose 50% Injectable 25 Gram(s) IV Push once  dextrose 50% Injectable 25 Gram(s) IV Push once  diltiazem    Tablet 30 milliGRAM(s) Oral every 6 hours  levETIRAcetam 500 milliGRAM(s) Oral two times a day  nystatin Powder 1 Application(s) Topical every 12 hours  sodium chloride 0.9%. 1000 milliLiter(s) (50 mL/Hr) IV Continuous <Continuous>  tamsulosin 0.4 milliGRAM(s) Oral at bedtime    MEDICATIONS  (PRN):  acetaminophen   Tablet .. 650 milliGRAM(s) Oral every 6 hours PRN Temp greater or equal to 38C (100.4F)  dextrose 40% Gel 15 Gram(s) Oral once PRN Blood Glucose LESS THAN 70 milliGRAM(s)/deciLiter  glucagon  Injectable 1 milliGRAM(s) IntraMuscular once PRN Glucose <70 milliGRAM(s)/deciLiter        CAPILLARY BLOOD GLUCOSE        I&O's Summary    26 Oct 2018 07:01  -  27 Oct 2018 07:00  --------------------------------------------------------  IN: 1270 mL / OUT: 1000 mL / NET: 270 mL        Vital Signs Last 24 Hrs  T(C): 36.8 (27 Oct 2018 11:25), Max: 37.1 (26 Oct 2018 16:38)  T(F): 98.2 (27 Oct 2018 11:25), Max: 98.7 (26 Oct 2018 16:38)  HR: 95 (27 Oct 2018 11:25) (95 - 110)  BP: 100/56 (27 Oct 2018 11:25) (100/56 - 130/60)  BP(mean): --  RR: 18 (27 Oct 2018 11:25) (18 - 18)  SpO2: 99% (27 Oct 2018 11:25) (95% - 99%)    GENERAL: NAD, asthenic  sitting in bedside chair  HEAD:  left facial ecchymoses resolving, Normocephalic  EYES: EOMI, PERRLA, conjunctiva and sclera clear  NECK: Supple, No JVD  CHEST/LUNG: no rhonchi, no expiratory wheeze  HEART: S1 S2; No rubs or gallops, no murmurs appreciated  ABDOMEN: Soft, Nontender, Nondistended; Bowel sounds present  EXTREMITIES:  No clubbing or cyanosis, + Peripheral Pulses,  no edema  PSYCH: Alert,   NEUROLOGY: left UE contracted since CVA  left leg 2 - 3/5 power  SKIN: No rashes or lesions    LABS:                        9.2    9.73  )-----------( 312      ( 27 Oct 2018 10:52 )             27.6     10-27    141  |  106  |  <4<L>  ----------------------------<  117<H>  3.4<L>   |  24  |  0.62    Ca    8.3<L>      27 Oct 2018 07:49  Phos  2.6     10-27  Mg     2.0     10-27      PT/INR - ( 27 Oct 2018 10:52 )   PT: 20.8 sec;   INR: 1.82 ratio                     All consultant(s) notes reviewed and care discussed with other providers    Contact Number, Dr Styles [phone]

## 2018-10-27 NOTE — PROGRESS NOTE ADULT - SUBJECTIVE AND OBJECTIVE BOX
Interval Events: Pt s/e  not eating well  daughter at bedside     MEDICATIONS  (STANDING):  ALBUTerol/ipratropium for Nebulization 3 milliLiter(s) Nebulizer every 6 hours  dextrose 5%. 1000 milliLiter(s) (50 mL/Hr) IV Continuous <Continuous>  dextrose 50% Injectable 12.5 Gram(s) IV Push once  dextrose 50% Injectable 25 Gram(s) IV Push once  dextrose 50% Injectable 25 Gram(s) IV Push once  diltiazem    Tablet 30 milliGRAM(s) Oral every 6 hours  levETIRAcetam 500 milliGRAM(s) Oral two times a day  nystatin Powder 1 Application(s) Topical every 12 hours  sodium chloride 0.9%. 1000 milliLiter(s) (50 mL/Hr) IV Continuous <Continuous>  tamsulosin 0.4 milliGRAM(s) Oral at bedtime    MEDICATIONS  (PRN):  acetaminophen   Tablet .. 650 milliGRAM(s) Oral every 6 hours PRN Temp greater or equal to 38C (100.4F)  dextrose 40% Gel 15 Gram(s) Oral once PRN Blood Glucose LESS THAN 70 milliGRAM(s)/deciLiter  glucagon  Injectable 1 milliGRAM(s) IntraMuscular once PRN Glucose <70 milliGRAM(s)/deciLiter      Allergies    No Known Allergies    Intolerances        Review of Systems:    General:  No wt loss, fevers, chills, night sweats,fatigue,   Eyes:  Good vision, no reported pain  ENT:  No sore throat, pain, runny nose, dysphagia  CV:  No pain, palpitations, hypo/hypertension  Resp:  No dyspnea, + cough, tachypnea, wheezing  GI:  No pain, No nausea, No vomiting, No diarrhea, No constipation, No weight loss, No fever, No pruritis, No rectal bleeding, No melena, No dysphagia  :  No pain, bleeding, incontinence, nocturia  Muscle:  No pain, weakness  Neuro:  No weakness, tingling, memory problems  Psych:  No fatigue, insomnia, mood problems, depression  Endocrine:  No polyuria, polydypsia, cold/heat intolerance  Heme:  No petechiae, ecchymosis, easy bruisability  Skin:  No rash, tattoos, scars, edema      Vital Signs Last 24 Hrs  T(C): 36.6 (26 Oct 2018 10:00), Max: 37 (25 Oct 2018 12:50)  T(F): 97.9 (26 Oct 2018 10:00), Max: 98.6 (25 Oct 2018 12:50)  HR: 100 (26 Oct 2018 10:00) (100 - 121)  BP: 108/67 (26 Oct 2018 10:00) (108/67 - 142/83)  BP(mean): --  RR: 19 (26 Oct 2018 10:00) (18 - 19)  SpO2: 95% (26 Oct 2018 10:00) (95% - 98%)    PHYSICAL EXAM:    Constitutional: NAD, well-developed  HEENT: EOMI, throat clear  Neck: No LAD, supple  Respiratory: CTA and P  Cardiovascular: S1 and S2, RRR, no M  Gastrointestinal: BS+, soft, NT/ND, neg HSM,  Extremities: No peripheral edema, neg clubing, cyanosis  Vascular: 2+ peripheral pulses  Neurological: A/O x 3, no focal deficits  Psychiatric: Normal mood, normal affect  Skin: No rashes      LABS:                        9.8    10.62 )-----------( 330      ( 26 Oct 2018 07:57 )             29.7     10-26    141  |  105  |  <4<L>  ----------------------------<  135<H>  3.4<L>   |  23  |  0.61    Ca    8.4      26 Oct 2018 06:42  Mg     1.5     10-26      PT/INR - ( 26 Oct 2018 07:53 )   PT: 27.1 sec;   INR: 2.36 ratio           Urinalysis Basic - ( 24 Oct 2018 22:40 )    Color: x / Appearance: x / SG: x / pH: x  Gluc: x / Ketone: x  / Bili: x / Urobili: x   Blood: x / Protein: x / Nitrite: x   Leuk Esterase: x / RBC: 20 /HPF /  /HPF   Sq Epi: x / Non Sq Epi: 0 /HPF / Bacteria: Few        RADIOLOGY & ADDITIONAL TESTS:

## 2018-10-28 LAB
ANION GAP SERPL CALC-SCNC: 10 MMOL/L — SIGNIFICANT CHANGE UP (ref 5–17)
BUN SERPL-MCNC: <4 MG/DL — LOW (ref 7–23)
CALCIUM SERPL-MCNC: 8.3 MG/DL — LOW (ref 8.4–10.5)
CHLORIDE SERPL-SCNC: 107 MMOL/L — SIGNIFICANT CHANGE UP (ref 96–108)
CO2 SERPL-SCNC: 23 MMOL/L — SIGNIFICANT CHANGE UP (ref 22–31)
CREAT SERPL-MCNC: 0.58 MG/DL — SIGNIFICANT CHANGE UP (ref 0.5–1.3)
GLUCOSE SERPL-MCNC: 114 MG/DL — HIGH (ref 70–99)
HCT VFR BLD CALC: 28.1 % — LOW (ref 34.5–45)
HGB BLD-MCNC: 9.2 G/DL — LOW (ref 11.5–15.5)
INR BLD: 1.54 RATIO — HIGH (ref 0.88–1.16)
MCHC RBC-ENTMCNC: 31.1 PG — SIGNIFICANT CHANGE UP (ref 27–34)
MCHC RBC-ENTMCNC: 32.7 GM/DL — SIGNIFICANT CHANGE UP (ref 32–36)
MCV RBC AUTO: 94.9 FL — SIGNIFICANT CHANGE UP (ref 80–100)
PLATELET # BLD AUTO: 300 K/UL — SIGNIFICANT CHANGE UP (ref 150–400)
POTASSIUM SERPL-MCNC: 3.6 MMOL/L — SIGNIFICANT CHANGE UP (ref 3.5–5.3)
POTASSIUM SERPL-SCNC: 3.6 MMOL/L — SIGNIFICANT CHANGE UP (ref 3.5–5.3)
PROTHROM AB SERPL-ACNC: 17.6 SEC — HIGH (ref 10–13.1)
RBC # BLD: 2.96 M/UL — LOW (ref 3.8–5.2)
RBC # FLD: 13.9 % — SIGNIFICANT CHANGE UP (ref 10.3–14.5)
SODIUM SERPL-SCNC: 140 MMOL/L — SIGNIFICANT CHANGE UP (ref 135–145)
WBC # BLD: 8.26 K/UL — SIGNIFICANT CHANGE UP (ref 3.8–10.5)
WBC # FLD AUTO: 8.26 K/UL — SIGNIFICANT CHANGE UP (ref 3.8–10.5)

## 2018-10-28 RX ORDER — ENOXAPARIN SODIUM 100 MG/ML
40 INJECTION SUBCUTANEOUS ONCE
Qty: 0 | Refills: 0 | Status: COMPLETED | OUTPATIENT
Start: 2018-10-28 | End: 2018-10-28

## 2018-10-28 RX ADMIN — ENOXAPARIN SODIUM 40 MILLIGRAM(S): 100 INJECTION SUBCUTANEOUS at 12:31

## 2018-10-28 RX ADMIN — Medication 3 MILLILITER(S): at 23:26

## 2018-10-28 RX ADMIN — TAMSULOSIN HYDROCHLORIDE 0.4 MILLIGRAM(S): 0.4 CAPSULE ORAL at 23:26

## 2018-10-28 RX ADMIN — SODIUM CHLORIDE 50 MILLILITER(S): 9 INJECTION INTRAMUSCULAR; INTRAVENOUS; SUBCUTANEOUS at 23:26

## 2018-10-28 RX ADMIN — NYSTATIN CREAM 1 APPLICATION(S): 100000 CREAM TOPICAL at 06:35

## 2018-10-28 RX ADMIN — LEVETIRACETAM 500 MILLIGRAM(S): 250 TABLET, FILM COATED ORAL at 06:30

## 2018-10-28 RX ADMIN — SODIUM CHLORIDE 50 MILLILITER(S): 9 INJECTION INTRAMUSCULAR; INTRAVENOUS; SUBCUTANEOUS at 06:30

## 2018-10-28 RX ADMIN — Medication 3 MILLILITER(S): at 06:30

## 2018-10-28 RX ADMIN — Medication 3 MILLILITER(S): at 17:19

## 2018-10-28 RX ADMIN — NYSTATIN CREAM 1 APPLICATION(S): 100000 CREAM TOPICAL at 17:19

## 2018-10-28 RX ADMIN — Medication 3 MILLILITER(S): at 12:31

## 2018-10-28 RX ADMIN — LEVETIRACETAM 500 MILLIGRAM(S): 250 TABLET, FILM COATED ORAL at 17:19

## 2018-10-28 NOTE — PROGRESS NOTE ADULT - SUBJECTIVE AND OBJECTIVE BOX
Interval Events: Pt s/e  not eating well      MEDICATIONS  (STANDING):  ALBUTerol/ipratropium for Nebulization 3 milliLiter(s) Nebulizer every 6 hours  dextrose 5%. 1000 milliLiter(s) (50 mL/Hr) IV Continuous <Continuous>  dextrose 50% Injectable 12.5 Gram(s) IV Push once  dextrose 50% Injectable 25 Gram(s) IV Push once  dextrose 50% Injectable 25 Gram(s) IV Push once  diltiazem    Tablet 30 milliGRAM(s) Oral every 6 hours  levETIRAcetam 500 milliGRAM(s) Oral two times a day  nystatin Powder 1 Application(s) Topical every 12 hours  sodium chloride 0.9%. 1000 milliLiter(s) (50 mL/Hr) IV Continuous <Continuous>  tamsulosin 0.4 milliGRAM(s) Oral at bedtime    MEDICATIONS  (PRN):  acetaminophen   Tablet .. 650 milliGRAM(s) Oral every 6 hours PRN Temp greater or equal to 38C (100.4F)  dextrose 40% Gel 15 Gram(s) Oral once PRN Blood Glucose LESS THAN 70 milliGRAM(s)/deciLiter  glucagon  Injectable 1 milliGRAM(s) IntraMuscular once PRN Glucose <70 milliGRAM(s)/deciLiter      Allergies    No Known Allergies    Intolerances        Review of Systems:    General:  No wt loss, fevers, chills, night sweats,fatigue,   Eyes:  Good vision, no reported pain  ENT:  No sore throat, pain, runny nose, dysphagia  CV:  No pain, palpitations, hypo/hypertension  Resp:  No dyspnea, + cough, tachypnea, wheezing  GI:  No pain, No nausea, No vomiting, No diarrhea, No constipation, No weight loss, No fever, No pruritis, No rectal bleeding, No melena, No dysphagia  :  No pain, bleeding, incontinence, nocturia  Muscle:  No pain, weakness  Neuro:  No weakness, tingling, memory problems  Psych:  No fatigue, insomnia, mood problems, depression  Endocrine:  No polyuria, polydypsia, cold/heat intolerance  Heme:  No petechiae, ecchymosis, easy bruisability  Skin:  No rash, tattoos, scars, edema      Vital Signs Last 24 Hrs  T(C): 36.6 (26 Oct 2018 10:00), Max: 37 (25 Oct 2018 12:50)  T(F): 97.9 (26 Oct 2018 10:00), Max: 98.6 (25 Oct 2018 12:50)  HR: 100 (26 Oct 2018 10:00) (100 - 121)  BP: 108/67 (26 Oct 2018 10:00) (108/67 - 142/83)  BP(mean): --  RR: 19 (26 Oct 2018 10:00) (18 - 19)  SpO2: 95% (26 Oct 2018 10:00) (95% - 98%)    PHYSICAL EXAM:    Constitutional: NAD, well-developed  HEENT: EOMI, throat clear  Neck: No LAD, supple  Respiratory: CTA and P  Cardiovascular: S1 and S2, RRR, no M  Gastrointestinal: BS+, soft, NT/ND, neg HSM,  Extremities: No peripheral edema, neg clubing, cyanosis  Vascular: 2+ peripheral pulses  Neurological: A/O x 3, no focal deficits  Psychiatric: Normal mood, normal affect  Skin: No rashes      LABS:                        9.8    10.62 )-----------( 330      ( 26 Oct 2018 07:57 )             29.7     10-26    141  |  105  |  <4<L>  ----------------------------<  135<H>  3.4<L>   |  23  |  0.61    Ca    8.4      26 Oct 2018 06:42  Mg     1.5     10-26      PT/INR - ( 26 Oct 2018 07:53 )   PT: 27.1 sec;   INR: 2.36 ratio           Urinalysis Basic - ( 24 Oct 2018 22:40 )    Color: x / Appearance: x / SG: x / pH: x  Gluc: x / Ketone: x  / Bili: x / Urobili: x   Blood: x / Protein: x / Nitrite: x   Leuk Esterase: x / RBC: 20 /HPF /  /HPF   Sq Epi: x / Non Sq Epi: 0 /HPF / Bacteria: Few        RADIOLOGY & ADDITIONAL TESTS:

## 2018-10-28 NOTE — PROGRESS NOTE ADULT - SUBJECTIVE AND OBJECTIVE BOX
Patient is a 73y old  Female who presents with a chief complaint of Fall, AMS (27 Oct 2018 16:18)      SUBJECTIVE / OVERNIGHT EVENTS: no overnight events  eating breakfast unassisted    ROS:  Resp: + occasional cough no sputum production  CVS: No chest pain no palpitations no orthopnea  GI: no N/V/D  : no dysuria, no hematuria  Neuro: no weakness no paresthesias  Heme: No petechiae no easy bruising  Msk: No joint pain no swelling  Skin: No rash no itching        MEDICATIONS  (STANDING):  ALBUTerol/ipratropium for Nebulization 3 milliLiter(s) Nebulizer every 6 hours  dextrose 5%. 1000 milliLiter(s) (50 mL/Hr) IV Continuous <Continuous>  dextrose 50% Injectable 12.5 Gram(s) IV Push once  dextrose 50% Injectable 25 Gram(s) IV Push once  dextrose 50% Injectable 25 Gram(s) IV Push once  diltiazem    Tablet 30 milliGRAM(s) Oral every 6 hours  enoxaparin Injectable 40 milliGRAM(s) SubCutaneous once  levETIRAcetam 500 milliGRAM(s) Oral two times a day  nystatin Powder 1 Application(s) Topical every 12 hours  sodium chloride 0.9%. 1000 milliLiter(s) (50 mL/Hr) IV Continuous <Continuous>  tamsulosin 0.4 milliGRAM(s) Oral at bedtime    MEDICATIONS  (PRN):  acetaminophen   Tablet .. 650 milliGRAM(s) Oral every 6 hours PRN Temp greater or equal to 38C (100.4F)  dextrose 40% Gel 15 Gram(s) Oral once PRN Blood Glucose LESS THAN 70 milliGRAM(s)/deciLiter  glucagon  Injectable 1 milliGRAM(s) IntraMuscular once PRN Glucose <70 milliGRAM(s)/deciLiter        CAPILLARY BLOOD GLUCOSE        I&O's Summary    27 Oct 2018 07:01  -  28 Oct 2018 07:00  --------------------------------------------------------  IN: 1730 mL / OUT: 1150 mL / NET: 580 mL        Vital Signs Last 24 Hrs  T(C): 36.7 (28 Oct 2018 06:19), Max: 36.9 (27 Oct 2018 14:30)  T(F): 98.1 (28 Oct 2018 06:19), Max: 98.5 (27 Oct 2018 14:30)  HR: 103 (28 Oct 2018 06:19) (95 - 120)  BP: 129/63 (28 Oct 2018 06:19) (97/66 - 135/75)  BP(mean): --  RR: 16 (28 Oct 2018 06:19) (16 - 18)  SpO2: 94% (28 Oct 2018 06:19) (94% - 99%)    GENERAL: NAD, asthenic  sitting in bedside chair  HEAD:  left facial ecchymoses resolving, Normocephalic  EYES: EOMI, PERRLA, conjunctiva and sclera clear  NECK: Supple, No JVD  CHEST/LUNG: no rhonchi, no expiratory wheeze  HEART: S1 S2; No rubs or gallops, no murmurs appreciated  ABDOMEN: Soft, Nontender, Nondistended; Bowel sounds present  EXTREMITIES:  No clubbing or cyanosis, + Peripheral Pulses,  no edema  PSYCH: Alert,   NEUROLOGY: left UE contracted since CVA  left leg 2 - 3/5 power  SKIN: No rashes or lesions    LABS:                        9.2    8.26  )-----------( 300      ( 28 Oct 2018 08:38 )             28.1     10-28    140  |  107  |  <4<L>  ----------------------------<  114<H>  3.6   |  23  |  0.58    Ca    8.3<L>      28 Oct 2018 07:23  Phos  2.6     10-27  Mg     2.0     10-27      PT/INR - ( 28 Oct 2018 08:34 )   PT: 17.6 sec;   INR: 1.54 ratio                     All consultant(s) notes reviewed and care discussed with other providers    Contact Number, Dr Styles [phone]

## 2018-10-29 LAB
ANION GAP SERPL CALC-SCNC: 9 MMOL/L — SIGNIFICANT CHANGE UP (ref 5–17)
BUN SERPL-MCNC: <4 MG/DL — LOW (ref 7–23)
CALCIUM SERPL-MCNC: 8.4 MG/DL — SIGNIFICANT CHANGE UP (ref 8.4–10.5)
CHLORIDE SERPL-SCNC: 102 MMOL/L — SIGNIFICANT CHANGE UP (ref 96–108)
CO2 SERPL-SCNC: 26 MMOL/L — SIGNIFICANT CHANGE UP (ref 22–31)
CREAT SERPL-MCNC: 0.56 MG/DL — SIGNIFICANT CHANGE UP (ref 0.5–1.3)
GLUCOSE SERPL-MCNC: 117 MG/DL — HIGH (ref 70–99)
HCT VFR BLD CALC: 27.9 % — LOW (ref 34.5–45)
HGB BLD-MCNC: 9.3 G/DL — LOW (ref 11.5–15.5)
INR BLD: 1.36 RATIO — HIGH (ref 0.88–1.16)
MCHC RBC-ENTMCNC: 31.4 PG — SIGNIFICANT CHANGE UP (ref 27–34)
MCHC RBC-ENTMCNC: 33.3 GM/DL — SIGNIFICANT CHANGE UP (ref 32–36)
MCV RBC AUTO: 94.3 FL — SIGNIFICANT CHANGE UP (ref 80–100)
PLATELET # BLD AUTO: 311 K/UL — SIGNIFICANT CHANGE UP (ref 150–400)
POTASSIUM SERPL-MCNC: 3.4 MMOL/L — LOW (ref 3.5–5.3)
POTASSIUM SERPL-SCNC: 3.4 MMOL/L — LOW (ref 3.5–5.3)
PROTHROM AB SERPL-ACNC: 15.5 SEC — HIGH (ref 10–13.1)
RBC # BLD: 2.96 M/UL — LOW (ref 3.8–5.2)
RBC # FLD: 13.8 % — SIGNIFICANT CHANGE UP (ref 10.3–14.5)
SODIUM SERPL-SCNC: 137 MMOL/L — SIGNIFICANT CHANGE UP (ref 135–145)
WBC # BLD: 8.22 K/UL — SIGNIFICANT CHANGE UP (ref 3.8–10.5)
WBC # FLD AUTO: 8.22 K/UL — SIGNIFICANT CHANGE UP (ref 3.8–10.5)

## 2018-10-29 RX ORDER — POTASSIUM CHLORIDE 20 MEQ
10 PACKET (EA) ORAL ONCE
Qty: 0 | Refills: 0 | Status: COMPLETED | OUTPATIENT
Start: 2018-10-29 | End: 2018-10-29

## 2018-10-29 RX ORDER — ACETAMINOPHEN 500 MG
650 TABLET ORAL ONCE
Qty: 0 | Refills: 0 | Status: COMPLETED | OUTPATIENT
Start: 2018-10-29 | End: 2018-10-29

## 2018-10-29 RX ADMIN — Medication 3 MILLILITER(S): at 05:59

## 2018-10-29 RX ADMIN — Medication 650 MILLIGRAM(S): at 23:00

## 2018-10-29 RX ADMIN — Medication 3 MILLILITER(S): at 11:49

## 2018-10-29 RX ADMIN — NYSTATIN CREAM 1 APPLICATION(S): 100000 CREAM TOPICAL at 06:00

## 2018-10-29 RX ADMIN — LEVETIRACETAM 500 MILLIGRAM(S): 250 TABLET, FILM COATED ORAL at 06:00

## 2018-10-29 RX ADMIN — SODIUM CHLORIDE 50 MILLILITER(S): 9 INJECTION INTRAMUSCULAR; INTRAVENOUS; SUBCUTANEOUS at 06:06

## 2018-10-29 RX ADMIN — Medication 650 MILLIGRAM(S): at 18:38

## 2018-10-29 RX ADMIN — NYSTATIN CREAM 1 APPLICATION(S): 100000 CREAM TOPICAL at 17:43

## 2018-10-29 RX ADMIN — Medication 650 MILLIGRAM(S): at 22:14

## 2018-10-29 RX ADMIN — LEVETIRACETAM 500 MILLIGRAM(S): 250 TABLET, FILM COATED ORAL at 17:43

## 2018-10-29 RX ADMIN — TAMSULOSIN HYDROCHLORIDE 0.4 MILLIGRAM(S): 0.4 CAPSULE ORAL at 21:11

## 2018-10-29 RX ADMIN — Medication 3 MILLILITER(S): at 17:43

## 2018-10-29 RX ADMIN — Medication 650 MILLIGRAM(S): at 19:05

## 2018-10-29 RX ADMIN — Medication 50 MILLIEQUIVALENT(S): at 16:26

## 2018-10-29 NOTE — PROGRESS NOTE ADULT - SUBJECTIVE AND OBJECTIVE BOX
Patient is a 73y old  Female who presents with a chief complaint of Fall, AMS (28 Oct 2018 14:43)    SUBJECTIVE / OVERNIGHT EVENTS: no overnight events    ROS:  Resp: No cough no sputum production  CVS: No chest pain no palpitations no orthopnea  GI: no N/V/D  : no dysuria, no hematuria  Neuro: no weakness no paresthesias  Heme: No petechiae no easy bruising  Msk: No joint pain no swelling  Skin: No rash no itching        MEDICATIONS  (STANDING):  ALBUTerol/ipratropium for Nebulization 3 milliLiter(s) Nebulizer every 6 hours  dextrose 5%. 1000 milliLiter(s) (50 mL/Hr) IV Continuous <Continuous>  dextrose 50% Injectable 12.5 Gram(s) IV Push once  dextrose 50% Injectable 25 Gram(s) IV Push once  dextrose 50% Injectable 25 Gram(s) IV Push once  diltiazem    Tablet 30 milliGRAM(s) Oral every 6 hours  levETIRAcetam 500 milliGRAM(s) Oral two times a day  nystatin Powder 1 Application(s) Topical every 12 hours  sodium chloride 0.9%. 1000 milliLiter(s) (50 mL/Hr) IV Continuous <Continuous>  tamsulosin 0.4 milliGRAM(s) Oral at bedtime    MEDICATIONS  (PRN):  acetaminophen   Tablet .. 650 milliGRAM(s) Oral every 6 hours PRN Temp greater or equal to 38C (100.4F)  dextrose 40% Gel 15 Gram(s) Oral once PRN Blood Glucose LESS THAN 70 milliGRAM(s)/deciLiter  glucagon  Injectable 1 milliGRAM(s) IntraMuscular once PRN Glucose <70 milliGRAM(s)/deciLiter        CAPILLARY BLOOD GLUCOSE        I&O's Summary    28 Oct 2018 07:01  -  29 Oct 2018 07:00  --------------------------------------------------------  IN: 1680 mL / OUT: 1975 mL / NET: -295 mL        Vital Signs Last 24 Hrs  T(C): 37.2 (29 Oct 2018 05:53), Max: 37.2 (29 Oct 2018 05:53)  T(F): 99 (29 Oct 2018 05:53), Max: 99 (29 Oct 2018 05:53)  HR: 96 (29 Oct 2018 05:53) (83 - 110)  BP: 120/61 (29 Oct 2018 05:53) (114/70 - 162/74)  BP(mean): --  RR: 16 (29 Oct 2018 05:53) (16 - 20)  SpO2: 94% (29 Oct 2018 05:53) (94% - 97%)    Physical Exam:  GENERAL: NAD, asthenic  HEAD:   Normocephalic  EYES: EOMI, PERRLA, conjunctiva and sclera clear  NECK: Supple, No JVD  CHEST/LUNG: no rhonchi, no expiratory wheeze  HEART: S1 S2; No rubs or gallops, no murmurs appreciated  ABDOMEN: Soft, Nontender, Nondistended; Bowel sounds present  EXTREMITIES:  No clubbing or cyanosis, + Peripheral Pulses,  no edema  PSYCH: Alert,   NEUROLOGY: left UE contracted since CVA  left leg 2 - 3/5 power  SKIN: No rashes or lesions  HEAD:  left facial ecchymoses resolving, Normocephalic  EYES: EOMI, PERRLA, conjunctiva and sclera clear  NECK: Supple, No JVD  CHEST/LUNG: no rhonchi, no expiratory wheeze  HEART: S1 S2; No rubs or gallops, no murmurs appreciated  ABDOMEN: Soft, Nontender, Nondistended; Bowel sounds present  EXTREMITIES:  No clubbing or cyanosis, + Peripheral Pulses,  no edema  PSYCH: Alert,   NEUROLOGY: left UE contracted since CVA  left leg 2 - 3/5 power  SKIN: No rashes or lesions    LABS:                        9.3    8.22  )-----------( 311      ( 29 Oct 2018 07:43 )             27.9     10-29    137  |  102  |  <4<L>  ----------------------------<  117<H>  3.4<L>   |  26  |  0.56    Ca    8.4      29 Oct 2018 06:36      PT/INR - ( 29 Oct 2018 09:29 )   PT: 15.5 sec;   INR: 1.36 ratio                     All consultant(s) notes reviewed and care discussed with other providers    Contact Number, Dr Styles [phone]

## 2018-10-30 LAB
ANION GAP SERPL CALC-SCNC: 11 MMOL/L — SIGNIFICANT CHANGE UP (ref 5–17)
BUN SERPL-MCNC: 5 MG/DL — LOW (ref 7–23)
CALCIUM SERPL-MCNC: 7.8 MG/DL — LOW (ref 8.4–10.5)
CHLORIDE SERPL-SCNC: 107 MMOL/L — SIGNIFICANT CHANGE UP (ref 96–108)
CO2 SERPL-SCNC: 22 MMOL/L — SIGNIFICANT CHANGE UP (ref 22–31)
CREAT SERPL-MCNC: 0.53 MG/DL — SIGNIFICANT CHANGE UP (ref 0.5–1.3)
CULTURE RESULTS: SIGNIFICANT CHANGE UP
CULTURE RESULTS: SIGNIFICANT CHANGE UP
GLUCOSE SERPL-MCNC: 116 MG/DL — HIGH (ref 70–99)
INR BLD: 1.38 RATIO — HIGH (ref 0.88–1.16)
MAGNESIUM SERPL-MCNC: 1.5 MG/DL — LOW (ref 1.6–2.6)
POTASSIUM SERPL-MCNC: 3.4 MMOL/L — LOW (ref 3.5–5.3)
POTASSIUM SERPL-SCNC: 3.4 MMOL/L — LOW (ref 3.5–5.3)
PROTHROM AB SERPL-ACNC: 15.7 SEC — HIGH (ref 10–13.1)
SODIUM SERPL-SCNC: 140 MMOL/L — SIGNIFICANT CHANGE UP (ref 135–145)
SPECIMEN SOURCE: SIGNIFICANT CHANGE UP
SPECIMEN SOURCE: SIGNIFICANT CHANGE UP

## 2018-10-30 RX ORDER — ACETAMINOPHEN 500 MG
650 TABLET ORAL ONCE
Qty: 0 | Refills: 0 | Status: COMPLETED | OUTPATIENT
Start: 2018-10-30 | End: 2018-10-30

## 2018-10-30 RX ORDER — POTASSIUM CHLORIDE 20 MEQ
40 PACKET (EA) ORAL ONCE
Qty: 0 | Refills: 0 | Status: COMPLETED | OUTPATIENT
Start: 2018-10-30 | End: 2018-10-30

## 2018-10-30 RX ORDER — MAGNESIUM SULFATE 500 MG/ML
2 VIAL (ML) INJECTION ONCE
Qty: 0 | Refills: 0 | Status: COMPLETED | OUTPATIENT
Start: 2018-10-30 | End: 2018-10-30

## 2018-10-30 RX ORDER — WARFARIN SODIUM 2.5 MG/1
2.5 TABLET ORAL ONCE
Qty: 0 | Refills: 0 | Status: COMPLETED | OUTPATIENT
Start: 2018-10-30 | End: 2018-10-30

## 2018-10-30 RX ADMIN — LEVETIRACETAM 500 MILLIGRAM(S): 250 TABLET, FILM COATED ORAL at 05:15

## 2018-10-30 RX ADMIN — TAMSULOSIN HYDROCHLORIDE 0.4 MILLIGRAM(S): 0.4 CAPSULE ORAL at 21:18

## 2018-10-30 RX ADMIN — Medication 50 GRAM(S): at 12:49

## 2018-10-30 RX ADMIN — Medication 3 MILLILITER(S): at 12:26

## 2018-10-30 RX ADMIN — NYSTATIN CREAM 1 APPLICATION(S): 100000 CREAM TOPICAL at 05:15

## 2018-10-30 RX ADMIN — Medication 3 MILLILITER(S): at 17:12

## 2018-10-30 RX ADMIN — LEVETIRACETAM 500 MILLIGRAM(S): 250 TABLET, FILM COATED ORAL at 17:12

## 2018-10-30 RX ADMIN — Medication 650 MILLIGRAM(S): at 22:10

## 2018-10-30 RX ADMIN — Medication 3 MILLILITER(S): at 05:15

## 2018-10-30 RX ADMIN — NYSTATIN CREAM 1 APPLICATION(S): 100000 CREAM TOPICAL at 17:12

## 2018-10-30 RX ADMIN — WARFARIN SODIUM 2.5 MILLIGRAM(S): 2.5 TABLET ORAL at 21:18

## 2018-10-30 RX ADMIN — Medication 3 MILLILITER(S): at 00:57

## 2018-10-30 RX ADMIN — Medication 40 MILLIEQUIVALENT(S): at 14:16

## 2018-10-30 NOTE — CHART NOTE - NSCHARTNOTEFT_GEN_A_CORE
Nutrition Follow Up Note    Patient seen for: Consult for tube feeding with PO pleasure feeds  Discussed PO intake with patient's son and HHA.  As stated, patient's son plans to bring patient breakfast and lunch at rehab because these are her better intake meals.   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        Diet : Dysphagia 2 Nectar thickened liquids per SLP MBS recommendations     PO intake : <30% of meals     Source for PO intake: family, staff     Enteral  Nutrition: currently Jevity 1.2@ 30ml/hr x24 hrs      Daily Weight in k.8 (10-24)  Dosing Weight  42.8kg    Pertinent Medications: MEDICATIONS  (STANDING):  ALBUTerol/ipratropium for Nebulization 3 milliLiter(s) Nebulizer every 6 hours  dextrose 5%. 1000 milliLiter(s) (50 mL/Hr) IV Continuous <Continuous>  dextrose 50% Injectable 12.5 Gram(s) IV Push once  dextrose 50% Injectable 25 Gram(s) IV Push once  dextrose 50% Injectable 25 Gram(s) IV Push once  diltiazem    Tablet 30 milliGRAM(s) Oral every 6 hours  levETIRAcetam 500 milliGRAM(s) Oral two times a day  nystatin Powder 1 Application(s) Topical every 12 hours  potassium chloride   Solution 40 milliEquivalent(s) Enteral Tube once  tamsulosin 0.4 milliGRAM(s) Oral at bedtime  warfarin 2.5 milliGRAM(s) Enteral Tube once  Hypokalemia supplemented with KClMEDICATIONS  (PRN):  acetaminophen   Tablet .. 650 milliGRAM(s) Oral every 6 hours PRN Temp greater or equal to 38C (100.4F)  dextrose 40% Gel 15 Gram(s) Oral once PRN Blood Glucose LESS THAN 70 milliGRAM(s)/deciLiter  glucagon  Injectable 1 milliGRAM(s) IntraMuscular once PRN Glucose <70 milliGRAM(s)/deciLiter    Pertinent Labs: 10-30 @ 06:45: Na 140, BUN 5<L>, Cr 0.53, <H>, K+ 3.4<L>, Phos --, Mg 1.5<L>, Alk Phos --, ALT/SGPT --, AST/SGOT --, HbA1c --    Hypokalemia supplement with KCl    Skin per nursing documentation: no pressure breakdown      Estimated Needs:   [X ] no change since previous assessment  [ ] recalculated:     Previous Nutrition Diagnosis: none  Nutrition Diagnosis is: ongoing    Recommendations     Nocturnal feeds   7pm-5am  Jevity 1.2 @70ml/hr x10hrs to provide 700ml, 840calories, 18/kg, protein 39gm, 0.85gm/kg, based on recent weight  45.8kg.  Free water in formula 575ml, added free water per medical team    Monitor and Evaluation:  Monitor tube feed tolerance, weight, skin, labs.   Continue to monitor Nutritional intake, Tolerance to diet prescription, weights, labs, skin integrity    RD remains available upon request and will follow up per protocol

## 2018-10-30 NOTE — PROGRESS NOTE ADULT - SUBJECTIVE AND OBJECTIVE BOX
Interval Events: Pt seen and examined with son at bedside. Pt admits to incisional pain at PEG incision site  As per sons she ate [place] toast this morning  no n/v  PEG feeds to start today    MEDICATIONS  (STANDING):  ALBUTerol/ipratropium for Nebulization 3 milliLiter(s) Nebulizer every 6 hours  dextrose 5%. 1000 milliLiter(s) (50 mL/Hr) IV Continuous <Continuous>  dextrose 50% Injectable 12.5 Gram(s) IV Push once  dextrose 50% Injectable 25 Gram(s) IV Push once  dextrose 50% Injectable 25 Gram(s) IV Push once  diltiazem    Tablet 30 milliGRAM(s) Oral every 6 hours  levETIRAcetam 500 milliGRAM(s) Oral two times a day  nystatin Powder 1 Application(s) Topical every 12 hours  potassium chloride   Solution 40 milliEquivalent(s) Enteral Tube once  tamsulosin 0.4 milliGRAM(s) Oral at bedtime  warfarin 2.5 milliGRAM(s) Enteral Tube once    MEDICATIONS  (PRN):  acetaminophen   Tablet .. 650 milliGRAM(s) Oral every 6 hours PRN Temp greater or equal to 38C (100.4F)  dextrose 40% Gel 15 Gram(s) Oral once PRN Blood Glucose LESS THAN 70 milliGRAM(s)/deciLiter  glucagon  Injectable 1 milliGRAM(s) IntraMuscular once PRN Glucose <70 milliGRAM(s)/deciLiter      Allergies    No Known Allergies    Intolerances        Review of Systems:    General:  No wt loss, fevers, chills, night sweats,fatigue,   Eyes:  Good vision, no reported pain  ENT:  No sore throat, pain, runny nose, dysphagia  CV:  No pain, palpitations, hypo/hypertension  Resp:  No dyspnea, cough, tachypnea, wheezing  GI:  No pain, No nausea, No vomiting, No diarrhea, No constipation, No weight loss, No fever, No pruritis, No rectal bleeding, No melena, No dysphagia  :  No pain, bleeding, incontinence, nocturia  Muscle:  No pain, weakness  Neuro:  No weakness, tingling, memory problems  Psych:  No fatigue, insomnia, mood problems, depression  Endocrine:  No polyuria, polydypsia, cold/heat intolerance  Heme:  No petechiae, ecchymosis, easy bruisability  Skin:  No rash, tattoos, scars, edema      Vital Signs Last 24 Hrs  T(C): 37.2 (30 Oct 2018 10:44), Max: 37.4 (30 Oct 2018 04:44)  T(F): 98.9 (30 Oct 2018 10:44), Max: 99.4 (30 Oct 2018 04:44)  HR: 88 (30 Oct 2018 10:44) (88 - 104)  BP: 130/68 (30 Oct 2018 10:44) (108/61 - 136/86)  BP(mean): --  RR: 18 (30 Oct 2018 10:44) (18 - 18)  SpO2: 95% (30 Oct 2018 10:44) (93% - 95%)    PHYSICAL EXAM:    Constitutional: NAD, well-developed  HEENT: EOMI, throat clear  Neck: No LAD, supple  Respiratory: CTA and P  Cardiovascular: S1 and S2, RRR, no M  Gastrointestinal: BS+, soft, NT/ND, neg HSM, + PEG c/d/i  Extremities: No peripheral edema, neg clubing, cyanosis  Vascular: 2+ peripheral pulses  Neurological: A/O x 3, no focal deficits  Psychiatric: Normal mood, normal affect  Skin: No rashes      LABS:                        9.3    8.22  )-----------( 311      ( 29 Oct 2018 07:43 )             27.9     10-30    140  |  107  |  5<L>  ----------------------------<  116<H>  3.4<L>   |  22  |  0.53    Ca    7.8<L>      30 Oct 2018 06:45  Mg     1.5     10-30      PT/INR - ( 30 Oct 2018 08:07 )   PT: 15.7 sec;   INR: 1.38 ratio               RADIOLOGY & ADDITIONAL TESTS:

## 2018-10-30 NOTE — PROGRESS NOTE ADULT - SUBJECTIVE AND OBJECTIVE BOX
Patient is a 73y old  Female who presents with a chief complaint of Fall, AMS (29 Oct 2018 11:15)      SUBJECTIVE / OVERNIGHT EVENTS: no overnight events    ROS:  Resp: No cough no sputum production  CVS: No chest pain no palpitations no orthopnea  GI: no N/V/D  : no dysuria, no hematuria  Neuro: no weakness no paresthesias  Heme: No petechiae no easy bruising  Msk: No joint pain no swelling  Skin: No rash no itching        MEDICATIONS  (STANDING):  ALBUTerol/ipratropium for Nebulization 3 milliLiter(s) Nebulizer every 6 hours  dextrose 5%. 1000 milliLiter(s) (50 mL/Hr) IV Continuous <Continuous>  dextrose 50% Injectable 12.5 Gram(s) IV Push once  dextrose 50% Injectable 25 Gram(s) IV Push once  dextrose 50% Injectable 25 Gram(s) IV Push once  diltiazem    Tablet 30 milliGRAM(s) Oral every 6 hours  levETIRAcetam 500 milliGRAM(s) Oral two times a day  magnesium sulfate  IVPB 2 Gram(s) IV Intermittent once  nystatin Powder 1 Application(s) Topical every 12 hours  potassium chloride   Solution 40 milliEquivalent(s) Enteral Tube once  tamsulosin 0.4 milliGRAM(s) Oral at bedtime  warfarin 2.5 milliGRAM(s) Enteral Tube once    MEDICATIONS  (PRN):  acetaminophen   Tablet .. 650 milliGRAM(s) Oral every 6 hours PRN Temp greater or equal to 38C (100.4F)  dextrose 40% Gel 15 Gram(s) Oral once PRN Blood Glucose LESS THAN 70 milliGRAM(s)/deciLiter  glucagon  Injectable 1 milliGRAM(s) IntraMuscular once PRN Glucose <70 milliGRAM(s)/deciLiter        CAPILLARY BLOOD GLUCOSE        I&O's Summary    29 Oct 2018 07:01  -  30 Oct 2018 07:00  --------------------------------------------------------  IN: 1220 mL / OUT: 1400 mL / NET: -180 mL    30 Oct 2018 07:01  -  30 Oct 2018 11:29  --------------------------------------------------------  IN: 80 mL / OUT: 0 mL / NET: 80 mL        Vital Signs Last 24 Hrs  T(C): 37.2 (30 Oct 2018 10:44), Max: 37.4 (30 Oct 2018 04:44)  T(F): 98.9 (30 Oct 2018 10:44), Max: 99.4 (30 Oct 2018 04:44)  HR: 88 (30 Oct 2018 10:44) (88 - 104)  BP: 130/68 (30 Oct 2018 10:44) (108/61 - 136/86)  BP(mean): --  RR: 18 (30 Oct 2018 10:44) (18 - 18)  SpO2: 95% (30 Oct 2018 10:44) (93% - 95%)    Physical Exam:  GENERAL: NAD, asthenic  HEAD:   Normocephalic  EYES: EOMI, PERRLA, conjunctiva and sclera clear  NECK: Supple, No JVD  CHEST/LUNG: no rhonchi, no expiratory wheeze  HEART: S1 S2; No rubs or gallops, no murmurs appreciated  ABDOMEN: Soft, Nontender, Nondistended; Bowel sounds present  EXTREMITIES:  No clubbing or cyanosis, + Peripheral Pulses,  no edema  PSYCH: Alert,   NEUROLOGY: left UE contracted since CVA  left leg 2 - 3/5 power  SKIN: No rashes or lesions    LABS:                        9.3    8.22  )-----------( 311      ( 29 Oct 2018 07:43 )             27.9     10-30    140  |  107  |  5<L>  ----------------------------<  116<H>  3.4<L>   |  22  |  0.53    Ca    7.8<L>      30 Oct 2018 06:45  Mg     1.5     10-30      PT/INR - ( 30 Oct 2018 08:07 )   PT: 15.7 sec;   INR: 1.38 ratio                     All consultant(s) notes reviewed and care discussed with other providers    Contact Number, Dr Styles [phone]

## 2018-10-31 VITALS
OXYGEN SATURATION: 98 % | TEMPERATURE: 99 F | DIASTOLIC BLOOD PRESSURE: 76 MMHG | SYSTOLIC BLOOD PRESSURE: 122 MMHG | HEART RATE: 88 BPM | RESPIRATION RATE: 18 BRPM

## 2018-10-31 LAB
ANION GAP SERPL CALC-SCNC: 12 MMOL/L — SIGNIFICANT CHANGE UP (ref 5–17)
BUN SERPL-MCNC: 10 MG/DL — SIGNIFICANT CHANGE UP (ref 7–23)
CALCIUM SERPL-MCNC: 8.7 MG/DL — SIGNIFICANT CHANGE UP (ref 8.4–10.5)
CHLORIDE SERPL-SCNC: 105 MMOL/L — SIGNIFICANT CHANGE UP (ref 96–108)
CO2 SERPL-SCNC: 21 MMOL/L — LOW (ref 22–31)
CREAT SERPL-MCNC: 0.6 MG/DL — SIGNIFICANT CHANGE UP (ref 0.5–1.3)
GLUCOSE SERPL-MCNC: 145 MG/DL — HIGH (ref 70–99)
INR BLD: 1.33 RATIO — HIGH (ref 0.88–1.16)
MAGNESIUM SERPL-MCNC: 2.1 MG/DL — SIGNIFICANT CHANGE UP (ref 1.6–2.6)
POTASSIUM SERPL-MCNC: 5 MMOL/L — SIGNIFICANT CHANGE UP (ref 3.5–5.3)
POTASSIUM SERPL-SCNC: 5 MMOL/L — SIGNIFICANT CHANGE UP (ref 3.5–5.3)
PROTHROM AB SERPL-ACNC: 15.3 SEC — HIGH (ref 10–12.9)
SODIUM SERPL-SCNC: 138 MMOL/L — SIGNIFICANT CHANGE UP (ref 135–145)

## 2018-10-31 PROCEDURE — 86901 BLOOD TYPING SEROLOGIC RH(D): CPT

## 2018-10-31 PROCEDURE — 86788 WEST NILE VIRUS AB IGM: CPT

## 2018-10-31 PROCEDURE — L8699: CPT

## 2018-10-31 PROCEDURE — 87086 URINE CULTURE/COLONY COUNT: CPT

## 2018-10-31 PROCEDURE — 85027 COMPLETE CBC AUTOMATED: CPT

## 2018-10-31 PROCEDURE — 82435 ASSAY OF BLOOD CHLORIDE: CPT

## 2018-10-31 PROCEDURE — 92611 MOTION FLUOROSCOPY/SWALLOW: CPT

## 2018-10-31 PROCEDURE — 82947 ASSAY GLUCOSE BLOOD QUANT: CPT

## 2018-10-31 PROCEDURE — 87633 RESP VIRUS 12-25 TARGETS: CPT

## 2018-10-31 PROCEDURE — 97110 THERAPEUTIC EXERCISES: CPT

## 2018-10-31 PROCEDURE — 86789 WEST NILE VIRUS ANTIBODY: CPT

## 2018-10-31 PROCEDURE — 82330 ASSAY OF CALCIUM: CPT

## 2018-10-31 PROCEDURE — 87798 DETECT AGENT NOS DNA AMP: CPT

## 2018-10-31 PROCEDURE — 84157 ASSAY OF PROTEIN OTHER: CPT

## 2018-10-31 PROCEDURE — 83615 LACTATE (LD) (LDH) ENZYME: CPT

## 2018-10-31 PROCEDURE — 85610 PROTHROMBIN TIME: CPT

## 2018-10-31 PROCEDURE — 93970 EXTREMITY STUDY: CPT

## 2018-10-31 PROCEDURE — 96374 THER/PROPH/DIAG INJ IV PUSH: CPT | Mod: XU

## 2018-10-31 PROCEDURE — 83605 ASSAY OF LACTIC ACID: CPT

## 2018-10-31 PROCEDURE — G0515: CPT

## 2018-10-31 PROCEDURE — 70548 MR ANGIOGRAPHY NECK W/DYE: CPT

## 2018-10-31 PROCEDURE — 97116 GAIT TRAINING THERAPY: CPT

## 2018-10-31 PROCEDURE — 87449 NOS EACH ORGANISM AG IA: CPT

## 2018-10-31 PROCEDURE — 86850 RBC ANTIBODY SCREEN: CPT

## 2018-10-31 PROCEDURE — 85045 AUTOMATED RETICULOCYTE COUNT: CPT

## 2018-10-31 PROCEDURE — 87102 FUNGUS ISOLATION CULTURE: CPT

## 2018-10-31 PROCEDURE — 82945 GLUCOSE OTHER FLUID: CPT

## 2018-10-31 PROCEDURE — 85730 THROMBOPLASTIN TIME PARTIAL: CPT

## 2018-10-31 PROCEDURE — 89051 BODY FLUID CELL COUNT: CPT

## 2018-10-31 PROCEDURE — 87476 LYME DIS DNA AMP PROBE: CPT

## 2018-10-31 PROCEDURE — 87581 M.PNEUMON DNA AMP PROBE: CPT

## 2018-10-31 PROCEDURE — 80048 BASIC METABOLIC PNL TOTAL CA: CPT

## 2018-10-31 PROCEDURE — 84478 ASSAY OF TRIGLYCERIDES: CPT

## 2018-10-31 PROCEDURE — 84132 ASSAY OF SERUM POTASSIUM: CPT

## 2018-10-31 PROCEDURE — 92610 EVALUATE SWALLOWING FUNCTION: CPT

## 2018-10-31 PROCEDURE — 84100 ASSAY OF PHOSPHORUS: CPT

## 2018-10-31 PROCEDURE — 83690 ASSAY OF LIPASE: CPT

## 2018-10-31 PROCEDURE — 93005 ELECTROCARDIOGRAM TRACING: CPT

## 2018-10-31 PROCEDURE — 70553 MRI BRAIN STEM W/O & W/DYE: CPT

## 2018-10-31 PROCEDURE — 82803 BLOOD GASES ANY COMBINATION: CPT

## 2018-10-31 PROCEDURE — 95819 EEG AWAKE AND ASLEEP: CPT

## 2018-10-31 PROCEDURE — 80307 DRUG TEST PRSMV CHEM ANLYZR: CPT

## 2018-10-31 PROCEDURE — 71260 CT THORAX DX C+: CPT

## 2018-10-31 PROCEDURE — 86403 PARTICLE AGGLUT ANTBDY SCRN: CPT

## 2018-10-31 PROCEDURE — 87483 CNS DNA AMP PROBE TYPE 12-25: CPT

## 2018-10-31 PROCEDURE — 87116 MYCOBACTERIA CULTURE: CPT

## 2018-10-31 PROCEDURE — 84484 ASSAY OF TROPONIN QUANT: CPT

## 2018-10-31 PROCEDURE — 84443 ASSAY THYROID STIM HORMONE: CPT

## 2018-10-31 PROCEDURE — 97165 OT EVAL LOW COMPLEX 30 MIN: CPT

## 2018-10-31 PROCEDURE — 87486 CHLMYD PNEUM DNA AMP PROBE: CPT

## 2018-10-31 PROCEDURE — 95951: CPT

## 2018-10-31 PROCEDURE — 87070 CULTURE OTHR SPECIMN AEROBIC: CPT

## 2018-10-31 PROCEDURE — 81001 URINALYSIS AUTO W/SCOPE: CPT

## 2018-10-31 PROCEDURE — 87040 BLOOD CULTURE FOR BACTERIA: CPT

## 2018-10-31 PROCEDURE — 80061 LIPID PANEL: CPT

## 2018-10-31 PROCEDURE — 99291 CRITICAL CARE FIRST HOUR: CPT | Mod: 25

## 2018-10-31 PROCEDURE — 83036 HEMOGLOBIN GLYCOSYLATED A1C: CPT

## 2018-10-31 PROCEDURE — 36600 WITHDRAWAL OF ARTERIAL BLOOD: CPT

## 2018-10-31 PROCEDURE — 71045 X-RAY EXAM CHEST 1 VIEW: CPT

## 2018-10-31 PROCEDURE — 84295 ASSAY OF SERUM SODIUM: CPT

## 2018-10-31 PROCEDURE — 87205 SMEAR GRAM STAIN: CPT

## 2018-10-31 PROCEDURE — 72125 CT NECK SPINE W/O DYE: CPT

## 2018-10-31 PROCEDURE — 94640 AIRWAY INHALATION TREATMENT: CPT

## 2018-10-31 PROCEDURE — 74177 CT ABD & PELVIS W/CONTRAST: CPT

## 2018-10-31 PROCEDURE — 86900 BLOOD TYPING SEROLOGIC ABO: CPT

## 2018-10-31 PROCEDURE — 97112 NEUROMUSCULAR REEDUCATION: CPT

## 2018-10-31 PROCEDURE — 82553 CREATINE MB FRACTION: CPT

## 2018-10-31 PROCEDURE — 82550 ASSAY OF CK (CPK): CPT

## 2018-10-31 PROCEDURE — 85014 HEMATOCRIT: CPT

## 2018-10-31 PROCEDURE — 82962 GLUCOSE BLOOD TEST: CPT

## 2018-10-31 PROCEDURE — 94003 VENT MGMT INPAT SUBQ DAY: CPT

## 2018-10-31 PROCEDURE — 97162 PT EVAL MOD COMPLEX 30 MIN: CPT

## 2018-10-31 PROCEDURE — 93306 TTE W/DOPPLER COMPLETE: CPT

## 2018-10-31 PROCEDURE — 70486 CT MAXILLOFACIAL W/O DYE: CPT

## 2018-10-31 PROCEDURE — 86592 SYPHILIS TEST NON-TREP QUAL: CPT

## 2018-10-31 PROCEDURE — 83735 ASSAY OF MAGNESIUM: CPT

## 2018-10-31 PROCEDURE — 74230 X-RAY XM SWLNG FUNCJ C+: CPT

## 2018-10-31 PROCEDURE — A9585: CPT

## 2018-10-31 PROCEDURE — 83010 ASSAY OF HAPTOGLOBIN QUANT: CPT

## 2018-10-31 PROCEDURE — 82140 ASSAY OF AMMONIA: CPT

## 2018-10-31 PROCEDURE — 94002 VENT MGMT INPAT INIT DAY: CPT

## 2018-10-31 PROCEDURE — 80053 COMPREHEN METABOLIC PANEL: CPT

## 2018-10-31 PROCEDURE — 94799 UNLISTED PULMONARY SVC/PX: CPT

## 2018-10-31 PROCEDURE — 80202 ASSAY OF VANCOMYCIN: CPT

## 2018-10-31 PROCEDURE — 31500 INSERT EMERGENCY AIRWAY: CPT

## 2018-10-31 PROCEDURE — 70544 MR ANGIOGRAPHY HEAD W/O DYE: CPT

## 2018-10-31 PROCEDURE — 92526 ORAL FUNCTION THERAPY: CPT

## 2018-10-31 PROCEDURE — 70450 CT HEAD/BRAIN W/O DYE: CPT

## 2018-10-31 PROCEDURE — 97530 THERAPEUTIC ACTIVITIES: CPT

## 2018-10-31 RX ORDER — WARFARIN SODIUM 2.5 MG/1
1 TABLET ORAL
Qty: 0 | Refills: 0 | COMMUNITY

## 2018-10-31 RX ORDER — DILTIAZEM HCL 120 MG
1 CAPSULE, EXT RELEASE 24 HR ORAL
Qty: 0 | Refills: 0 | COMMUNITY
Start: 2018-10-31

## 2018-10-31 RX ORDER — DILTIAZEM HCL 120 MG
1 CAPSULE, EXT RELEASE 24 HR ORAL
Qty: 0 | Refills: 0 | COMMUNITY

## 2018-10-31 RX ORDER — WARFARIN SODIUM 2.5 MG/1
1 TABLET ORAL
Qty: 0 | Refills: 0 | COMMUNITY
Start: 2018-10-31

## 2018-10-31 RX ADMIN — Medication 3 MILLILITER(S): at 06:28

## 2018-10-31 RX ADMIN — LEVETIRACETAM 500 MILLIGRAM(S): 250 TABLET, FILM COATED ORAL at 06:28

## 2018-10-31 RX ADMIN — Medication 3 MILLILITER(S): at 00:50

## 2018-10-31 RX ADMIN — Medication 3 MILLILITER(S): at 12:45

## 2018-10-31 RX ADMIN — NYSTATIN CREAM 1 APPLICATION(S): 100000 CREAM TOPICAL at 06:28

## 2018-10-31 NOTE — PROGRESS NOTE ADULT - SUBJECTIVE AND OBJECTIVE BOX
Patient is a 73y old  Female who presents with a chief complaint of Fall, AMS (31 Oct 2018 11:18)    SUBJECTIVE / OVERNIGHT EVENTS: no overnight events    ROS:  Resp: No cough no sputum production  CVS: No chest pain no palpitations no orthopnea  GI: no N/V/D  : no dysuria, no hematuria  Neuro: no weakness no paresthesias  Heme: No petechiae no easy bruising  Msk: No joint pain no swelling  Skin: No rash no itching        MEDICATIONS  (STANDING):  ALBUTerol/ipratropium for Nebulization 3 milliLiter(s) Nebulizer every 6 hours  dextrose 5%. 1000 milliLiter(s) (50 mL/Hr) IV Continuous <Continuous>  dextrose 50% Injectable 12.5 Gram(s) IV Push once  dextrose 50% Injectable 25 Gram(s) IV Push once  dextrose 50% Injectable 25 Gram(s) IV Push once  diltiazem    Tablet 30 milliGRAM(s) Oral every 6 hours  levETIRAcetam 500 milliGRAM(s) Oral two times a day  nystatin Powder 1 Application(s) Topical every 12 hours  tamsulosin 0.4 milliGRAM(s) Oral at bedtime    MEDICATIONS  (PRN):  acetaminophen   Tablet .. 650 milliGRAM(s) Oral every 6 hours PRN Temp greater or equal to 38C (100.4F)  dextrose 40% Gel 15 Gram(s) Oral once PRN Blood Glucose LESS THAN 70 milliGRAM(s)/deciLiter  glucagon  Injectable 1 milliGRAM(s) IntraMuscular once PRN Glucose <70 milliGRAM(s)/deciLiter        CAPILLARY BLOOD GLUCOSE        I&O's Summary    30 Oct 2018 07:01  -  31 Oct 2018 07:00  --------------------------------------------------------  IN: 440 mL / OUT: 875 mL / NET: -435 mL    31 Oct 2018 07:01  -  31 Oct 2018 17:17  --------------------------------------------------------  IN: 240 mL / OUT: 175 mL / NET: 65 mL        Vital Signs Last 24 Hrs  T(C): 37 (31 Oct 2018 13:11), Max: 37.2 (30 Oct 2018 20:21)  T(F): 98.6 (31 Oct 2018 13:11), Max: 99 (30 Oct 2018 20:21)  HR: 88 (31 Oct 2018 13:11) (88 - 108)  BP: 122/76 (31 Oct 2018 13:11) (119/70 - 135/68)  BP(mean): --  RR: 18 (31 Oct 2018 13:11) (18 - 18)  SpO2: 98% (31 Oct 2018 13:11) (98% - 99%)    Physical Exam:  GENERAL: NAD, asthenic  HEAD:   Normocephalic  EYES: EOMI, PERRLA, conjunctiva and sclera clear  NECK: Supple, No JVD  CHEST/LUNG: no rhonchi, no expiratory wheeze  HEART: S1 S2; No rubs or gallops, no murmurs appreciated  ABDOMEN: Soft, Nontender, Nondistended; Bowel sounds present  EXTREMITIES:  No clubbing or cyanosis, + Peripheral Pulses,  no edema  PSYCH: Alert,   NEUROLOGY: left UE contracted since CVA  left leg 2 - 3/5 power  SKIN: No rashes or lesions    LABS:    10-31    138  |  105  |  10  ----------------------------<  145<H>  5.0   |  21<L>  |  0.60    Ca    8.7      31 Oct 2018 06:29  Mg     2.1     10-31      PT/INR - ( 31 Oct 2018 06:29 )   PT: 15.3 sec;   INR: 1.33 ratio                     All consultant(s) notes reviewed and care discussed with other providers    Contact Number, Dr Styles [phone]

## 2018-10-31 NOTE — PROGRESS NOTE ADULT - PROBLEM SELECTOR PLAN 7
h/o old CVA on Xarelto hence patient on warfarin  restart warfarin
h/o old CVA on Xarelto hence patient on warfarin
h/o old CVA on Xarelto hence patient on warfarin  discontinue warfarin till PEG in
h/o old CVA on Xarelto hence patient on warfarin  discontinue warfarin till PEG in
h/o old CVA on Xarelto hence patient on warfarin  on hold for now till PEG issue resolved
h/o old CVA on Xarelto hence patient on warfarin  resume warfarin 1 mg x 1 today
h/o old CVA on Xarelto hence patient on warfarin  resume warfarin 2.5 mg x 1 today
metoprolol po 50 BID with hold parameters
metoprolol po 50 BID with hold parameters
h/o old CVA on Xarelto hence patient on warfarin  on hold for now till PEG issue resolved

## 2018-10-31 NOTE — PROGRESS NOTE ADULT - PROBLEM SELECTOR PROBLEM 1
Dysphagia, unspecified type
Tachycardia
GOPAL (acute kidney injury)
Pneumonia, bacterial
Respiratory failure, acute
Tachycardia
Respiratory failure, acute
Acute respiratory failure, unspecified whether with hypoxia or hypercapnia
Acute respiratory failure, unspecified whether with hypoxia or hypercapnia
Tachycardia

## 2018-10-31 NOTE — PROGRESS NOTE ADULT - REASON FOR ADMISSION
Fall, AMS

## 2018-10-31 NOTE — CHART NOTE - NSCHARTNOTEFT_GEN_A_CORE
follow up- pt is accepted to [facility]; d/w attending MD; discussed discharge medication/plan; cleared for discharge; d/w pt/family.  Monica López(NP)  3 [facility], [phone]

## 2018-10-31 NOTE — PROGRESS NOTE ADULT - PROBLEM SELECTOR PLAN 8
metoprolol po 50 BID with hold parameters

## 2018-10-31 NOTE — PROGRESS NOTE ADULT - PROBLEM SELECTOR PLAN 1
- s/p EGD with PEG placement for supplemental nutrition  - feeds started, pt is tolerating them well  - daily PEG care  - monitor residuals  - AC restarted
- s/p EGD with PEG placement for supplemental nutrition  - feeds to be started today  - daily PEG care  - monitor residuals
npo   Tolerating NGT feeds at 40 cc/hour  pt failed repeat s/s today  may consider repeat swallow next week   if she fails next week when more alert or if she is unable to take in her caloric needs orally she may need a PEG  d/w family
npo   pt was tolerating NGT feeds at 40 cc/hour, she pulled out tube  await repeat s/s   if she fails next week when more alert or if she is unable to take in her caloric needs orally she may need a PEG  d/w family
npo   pt was tolerating NGT feeds at 40 cc/hour, she pulled out tube  repeat s/s with recommendation of Dysphagia 2 mechanical soft diet with nectar thickened liquids  If pt is unable to take in her caloric needs orally she may need a PEG  d/w family
overnight  unclear etiology  CXR no PNA  UA benigh  will hydrate  patient's intake has been very low over last few days  reassess tomorrow  discontinue antibiotics
patient and family agreeable for peg for supplimental nutrition  peg either monday   npo p mn
patient and family agreeable for peg for supplimental nutrition  peg either monday or tuesday
pt was tolerating NGT feeds at 40 cc/hour, she pulled out tube  repeat s/s with recommendation of Dysphagia 2 mechanical soft diet with nectar thickened liquids  If pt is unable to take in her caloric needs orally she may need a PEG
pt was tolerating NGT feeds at 40 cc/hour, she pulled out tube  repeat s/s with recommendation of Dysphagia 2 mechanical soft diet with nectar thickened liquids  currently tolerating diet well  no current plans for PEG  will follow
s/p repeat s/s with recommendation of Dysphagia 2 mechanical soft diet with nectar thickened liquids  currently tolerating diet well  no current plans for PEG  will follow
Stable-improved  has already recd zosyn course  Can dc abx today
Stable-improved  has already recd zosyn course  stable off abx  MBS/swallow eval per PCP
continue current abx though could DC vanco  If stable can DC zosyn tomorrow
failed TOV again  will reattempt in SNF  discussed with patient in detail, all questions answered  discussed with family at bedside in detail
likely obstructive  resolved  now with [facility]  TOV in 1 - 2 days   will d/w renal attending re TOV  failed TOV
likely obstructive  resolved  now with [facility]  TOV today  TOV in 1 - 2 days   will d/w renal attending re TOV  failed TOV
likely obstructive  resolved  now with [facility]  will d/w renal attending re TOV  failed TOV
resolved  UA benign  will hydrate  patient's intake has been very low over last few days  reassess tomorrow  discontinue antibiotics
resolved  now extubated  monitor closely
resolved  pulmonary following  will follow recommendations
resolved  pulmonary following  will follow recommendations
resolved  pulmonary following  will follow recommendations  off solumedrol IV
resolved  will continue to monitor
resolving   likely physiologic secondary to dehydration  HR 90 - 110 now NSR  no evidence of UTI  orthostasis resolved
resolving   likely physiologic secondary to dehydration  acceptable at present  no evidence of UTI  orthostasis resolved
resolving   will hydrate  patient's intake has been very low over last few days
resolved  now extubated  monitor closely
Remains extubated with good ABG  Currently with sats 93% on room air
Remains extubated.  Continue neb  treatments  Resume PT
resolved  will continue to monitor

## 2018-10-31 NOTE — PROGRESS NOTE ADULT - SUBJECTIVE AND OBJECTIVE BOX
Interval Events: Pt seen and examined. Pt admits to incisional pain at PEG incision site  She denies n/v  Tolerating PEG feeds     MEDICATIONS  (STANDING):  ALBUTerol/ipratropium for Nebulization 3 milliLiter(s) Nebulizer every 6 hours  dextrose 5%. 1000 milliLiter(s) (50 mL/Hr) IV Continuous <Continuous>  dextrose 50% Injectable 12.5 Gram(s) IV Push once  dextrose 50% Injectable 25 Gram(s) IV Push once  dextrose 50% Injectable 25 Gram(s) IV Push once  diltiazem    Tablet 30 milliGRAM(s) Oral every 6 hours  levETIRAcetam 500 milliGRAM(s) Oral two times a day  nystatin Powder 1 Application(s) Topical every 12 hours  tamsulosin 0.4 milliGRAM(s) Oral at bedtime    MEDICATIONS  (PRN):  acetaminophen   Tablet .. 650 milliGRAM(s) Oral every 6 hours PRN Temp greater or equal to 38C (100.4F)  dextrose 40% Gel 15 Gram(s) Oral once PRN Blood Glucose LESS THAN 70 milliGRAM(s)/deciLiter  glucagon  Injectable 1 milliGRAM(s) IntraMuscular once PRN Glucose <70 milliGRAM(s)/deciLiter      Allergies    No Known Allergies    Intolerances        Review of Systems:    General:  No wt loss, fevers, chills, night sweats,fatigue,   Eyes:  Good vision, no reported pain  ENT:  No sore throat, pain, runny nose, dysphagia  CV:  No pain, palpitations, hypo/hypertension  Resp:  No dyspnea, cough, tachypnea, wheezing  GI:  + incisional pain, No nausea, No vomiting, No diarrhea, No constipation, No weight loss, No fever, No pruritis, No rectal bleeding, No melena, No dysphagia  :  No pain, bleeding, incontinence, nocturia  Muscle:  No pain, weakness  Neuro:  No weakness, tingling, memory problems  Psych:  No fatigue, insomnia, mood problems, depression  Endocrine:  No polyuria, polydypsia, cold/heat intolerance  Heme:  No petechiae, ecchymosis, easy bruisability  Skin:  No rash, tattoos, scars, edema      Vital Signs Last 24 Hrs  T(C): 37 (31 Oct 2018 04:44), Max: 37.2 (30 Oct 2018 20:21)  T(F): 98.6 (31 Oct 2018 04:44), Max: 99 (30 Oct 2018 20:21)  HR: 92 (31 Oct 2018 04:44) (90 - 92)  BP: 121/65 (31 Oct 2018 04:44) (119/70 - 126/76)  BP(mean): --  RR: 18 (31 Oct 2018 04:44) (18 - 18)  SpO2: 98% (31 Oct 2018 04:44) (98% - 99%)    PHYSICAL EXAM:    Constitutional: NAD, well-developed  HEENT: EOMI, throat clear  Neck: No LAD, supple  Respiratory: CTA and P  Cardiovascular: S1 and S2, RRR, no M  Gastrointestinal: BS+, soft, NT/ND, neg HSM, + PEG c/d/i  Extremities: No peripheral edema, neg clubing, cyanosis  Vascular: 2+ peripheral pulses  Neurological: A/O x 3, no focal deficits  Psychiatric: Normal mood, normal affect  Skin: No rashes      LABS:    10-31    138  |  105  |  10  ----------------------------<  145<H>  5.0   |  21<L>  |  0.60    Ca    8.7      31 Oct 2018 06:29  Mg     2.1     10-31      PT/INR - ( 31 Oct 2018 06:29 )   PT: 15.3 sec;   INR: 1.33 ratio               RADIOLOGY & ADDITIONAL TESTS:

## 2018-10-31 NOTE — PROGRESS NOTE ADULT - PROBLEM SELECTOR PROBLEM 7
CVA (cerebral vascular accident)
HTN (hypertension)
HTN (hypertension)
CVA (cerebral vascular accident)

## 2018-11-06 ENCOUNTER — APPOINTMENT (OUTPATIENT)
Dept: PHYSICAL MEDICINE AND REHAB | Facility: CLINIC | Age: 73
End: 2018-11-06

## 2018-11-10 LAB
CULTURE RESULTS: SIGNIFICANT CHANGE UP
SPECIMEN SOURCE: SIGNIFICANT CHANGE UP

## 2018-12-01 LAB
CULTURE RESULTS: SIGNIFICANT CHANGE UP
SPECIMEN SOURCE: SIGNIFICANT CHANGE UP

## 2018-12-10 ENCOUNTER — RX RENEWAL (OUTPATIENT)
Age: 73
End: 2018-12-10

## 2019-01-08 ENCOUNTER — APPOINTMENT (OUTPATIENT)
Dept: PHYSICAL MEDICINE AND REHAB | Facility: CLINIC | Age: 74
End: 2019-01-08
Payer: MEDICARE

## 2019-01-08 VITALS — OXYGEN SATURATION: 98 % | HEART RATE: 93 BPM | SYSTOLIC BLOOD PRESSURE: 164 MMHG | DIASTOLIC BLOOD PRESSURE: 83 MMHG

## 2019-01-08 DIAGNOSIS — R25.2 CRAMP AND SPASM: ICD-10-CM

## 2019-01-08 PROCEDURE — 64643 CHEMODENERV 1 EXTREM 1-4 EA: CPT

## 2019-01-08 PROCEDURE — 99213 OFFICE O/P EST LOW 20 MIN: CPT | Mod: 25

## 2019-01-08 PROCEDURE — 64642 CHEMODENERV 1 EXTREMITY 1-4: CPT

## 2019-01-08 PROCEDURE — 95874 GUIDE NERV DESTR NEEDLE EMG: CPT | Mod: 59

## 2019-01-08 NOTE — HISTORY OF PRESENT ILLNESS
[FreeTextEntry1] : Patient is a 73-year-old female history of left spastic hemiparesis/aphasia who last underwent a Botox injection on July 3, 2018. Her  reports that the patient was hospitalized October 10, 2018 for a seizure and fall. After 3 weeks hospitalization she was transferred to subacute rehabilitation at [facility] for 3 weeks and discharged on November 26, 2018. Functionally she ambulates in the home with a SAC/AFO with supervision.  feels she has progressed but is receiving outpatient physical therapy 2 times per week. Patient's independent in transfers and independent commode. She requires assistance with dressing. They do feel that the Botox injection has worn off and she has significant spasticity and tone in her left upper extremity. Discomfort with stretching, difficulty tolerating her resting hand splint. No hygiene issues.

## 2019-01-08 NOTE — PHYSICAL EXAM
[FreeTextEntry1] : General: Well developed female in no apparent distress. Patient is awake, alert, follows two-step commands. Cooperative.\par \par Motor:\par Right upper extremity/right lower extremity: Tone normal, active range of motion within functional limits with 5/5 motor power throughout.\par \par Left upper extremity: Synergistic movement of the shoulder flexors/elbow flexors less than 3/5 motor power. No active in movement at hand or wrist\par Tone:\par Left elbow flexors MAS = 1.\par Left wrist flexors MAS = 1+, passive extension to neutral position \par Left FDS MAS= 2 with a wrist flexed, MAS = 3\par Left FDP MAS = 2 with a wrist flexed, MAS = 2+ with a wrist extended.\par Left FPL MAS = 1 with a wrist flexed, MAS = 2-2+ with wrist extended.\par Left MCP flexors MAS = 2+-3.\par Left pronators MAS = 1-1+\par \par Left lower extremity: Able to passively dorsiflexed ankles to neutral with knee flexed position.\par Left quadriceps MAS = to 2+\par Left hamstrings MAS = 1+\par Left ankle plantar flexors MAS = 1, Ankle dorsiflexion to neutral passively with the knee flexed.\par \par Functional status: Patient ambulated with left AFO with good heel strike, min genu recurvatum with use of heel lift on left.

## 2019-01-08 NOTE — ASSESSMENT
[FreeTextEntry1] : Patient is a 73-year-old female history of left spastic paresis/aphasia who underwent Botox injection of the muscles listed above, tolerated well. Reduced a dose to the gastrocnemius and soleus due to relatively low tone. We'll take a close look at left wrist flexors next visit to evaluate the need for the primary flexes to be injected also.

## 2019-01-08 NOTE — REVIEW OF SYSTEMS
[Joint Stiffness] : joint stiffness [Muscle Weakness] : muscle weakness [Difficulty Walking] : difficulty walking [Negative] : Gastrointestinal [Fever] : no fever [Incontinence] : no incontinence

## 2019-01-08 NOTE — PROCEDURE
[Consent] : consent was given by patient or guardian [Site Verification] : the injection site was verified [Post-Injection Instructions Provided] : post-injection instructions were provided [Total Units: ___] : [unfilled] units [Total Vials: ___] : [unfilled] vials were used [Total Waste: ___] : with [unfilled] wasted [de-identified] : Procedural note for Botox injection:\par \par Under sterile conditions the following muscles were injected with Botox in a 2 mL dilution with EMG guidance:\par \par Left FDS --------------------------------55 units (3 sites)\par Left FDP --------------------------------35 units (2 sites)\par Left FPL--------------------------------- 25 units (2 sites)\par Left interossei-------------------------- 40 units (10 units x4)\par Left medial gastrocnemius----------- 70 units (3 sites)\par Left lateral gastrocnemius------------ 70 units (3 sites)\par Left soleus -------------------------------40 units (2 sites)\par \par Total ----------------------------------335 units

## 2019-01-29 ENCOUNTER — RX RENEWAL (OUTPATIENT)
Age: 74
End: 2019-01-29
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
## 2019-02-12 ENCOUNTER — APPOINTMENT (OUTPATIENT)
Dept: PHYSICAL MEDICINE AND REHAB | Facility: CLINIC | Age: 74
End: 2019-02-12

## 2019-02-26 ENCOUNTER — APPOINTMENT (OUTPATIENT)
Dept: PHYSICAL MEDICINE AND REHAB | Facility: CLINIC | Age: 74
End: 2019-02-26
Payer: MEDICARE

## 2019-02-26 VITALS — HEART RATE: 47 BPM | SYSTOLIC BLOOD PRESSURE: 164 MMHG | DIASTOLIC BLOOD PRESSURE: 81 MMHG | OXYGEN SATURATION: 98 %

## 2019-02-26 PROCEDURE — 99213 OFFICE O/P EST LOW 20 MIN: CPT

## 2019-02-26 RX ORDER — ONABOTULINUMTOXINA 100 [USP'U]/1
100 INJECTION, POWDER, LYOPHILIZED, FOR SOLUTION INTRADERMAL; INTRAMUSCULAR
Qty: 5 | Refills: 0 | Status: ACTIVE | OUTPATIENT
Start: 2019-02-26

## 2019-02-26 RX ORDER — AZITHROMYCIN 250 MG/1
250 TABLET, FILM COATED ORAL
Qty: 6 | Refills: 0 | Status: COMPLETED | COMMUNITY
Start: 2019-02-20

## 2019-02-26 NOTE — ASSESSMENT
[FreeTextEntry1] : Patient is a 73-year-old female history of left spastic hemiparesis/aphasia with good tone reduction noted above. Will add the left wrist flexors and increase the dose to the FDS, FDP, FPL. New Botox injection protocol:\par \par Left FCR -------------------------50 units\par Left FCU -------------------------50 units\par Left FDS --------------------------70 units\par Left FDP-------------------------- 40 units\par Left FPL --------------------------30 units\par Left lumbricals------------------- 40 units\par Left medial gastrocnemius---- 70 units\par Left lateral gastrocnemius -----70 units\par Left soleus ------------------------40 units\par \par Total------------------------------ 460 units

## 2019-02-26 NOTE — REVIEW OF SYSTEMS
[Joint Stiffness] : joint stiffness [Muscle Weakness] : muscle weakness [Difficulty Walking] : difficulty walking [Negative] : Gastrointestinal [Fever] : no fever

## 2019-02-26 NOTE — PHYSICAL EXAM
[FreeTextEntry1] : General: Well developed female in no apparent distress. Patient is awake, alert, follows two-step commands. Cooperative.\par \par Motor:\par Right upper extremity/right lower extremity: Tone normal, active range of motion within functional limits with 5/5 motor power throughout.\par \par Left upper extremity: Synergistic movement of the shoulder flexors/elbow flexors less than 3/5 motor power. No active in movement at hand or wrist\par Tone:\par Left elbow flexors MAS = 1.\par Left wrist flexors MAS = 2+-3, passive extension to 45deg position \par Left FDS MAS= 1+-2 with a wrist flexed, MAS = 2-2+ WE\par Left FDP MAS = 1 with a wrist flexed, MAS = 2 with a wrist extended.\par Left FPL MAS = 1 with a wrist flexed, MAS = 1+2 with wrist extended.\par Left MCP flexors MAS = 0-1.\par Left pronators MAS = 1-1+\par \par Left lower extremity: Able to passively dorsiflexed ankles to 5deg above neutral with knee flexed/extended position.\par Left ankle plantar flexors MAS = 0-1\par \par Functional status: Patient ambulated with left AFO with good heel strike, min genu recurvatum with use of heel lift on left.

## 2019-02-26 NOTE — HISTORY OF PRESENT ILLNESS
[FreeTextEntry1] : Patient is a 73-year-old female history of left spastic hemiparesis/aphasia who underwent a Botox injection on January 8, 2019. Patient has been reports a good tone reduction after injection. She reports that the left wrist is stiff and can cause discomfort with stretching. Tone reduction has been good at the Achilles tendon and easy to stretch. Patient ambulating with SAC/AFO with supervision. Patient requires assistance with dressing.

## 2019-03-13 ENCOUNTER — EMERGENCY (EMERGENCY)
Facility: HOSPITAL | Age: 74
LOS: 1 days | End: 2019-03-13
Attending: EMERGENCY MEDICINE
Payer: MEDICARE

## 2019-03-13 VITALS
DIASTOLIC BLOOD PRESSURE: 90 MMHG | OXYGEN SATURATION: 96 % | HEART RATE: 95 BPM | RESPIRATION RATE: 18 BRPM | SYSTOLIC BLOOD PRESSURE: 160 MMHG

## 2019-03-13 VITALS
TEMPERATURE: 98 F | DIASTOLIC BLOOD PRESSURE: 72 MMHG | WEIGHT: 91.05 LBS | SYSTOLIC BLOOD PRESSURE: 128 MMHG | OXYGEN SATURATION: 96 % | HEIGHT: 60 IN | HEART RATE: 99 BPM | RESPIRATION RATE: 18 BRPM

## 2019-03-13 LAB
ALBUMIN SERPL ELPH-MCNC: 3.9 G/DL — SIGNIFICANT CHANGE UP (ref 3.3–5)
ALP SERPL-CCNC: 119 U/L — SIGNIFICANT CHANGE UP (ref 40–120)
ALT FLD-CCNC: 15 U/L — SIGNIFICANT CHANGE UP (ref 10–45)
ANION GAP SERPL CALC-SCNC: 13 MMOL/L — SIGNIFICANT CHANGE UP (ref 5–17)
APTT BLD: 36.4 SEC — HIGH (ref 27.5–36.3)
AST SERPL-CCNC: 19 U/L — SIGNIFICANT CHANGE UP (ref 10–40)
BASOPHILS # BLD AUTO: 0 K/UL — SIGNIFICANT CHANGE UP (ref 0–0.2)
BASOPHILS NFR BLD AUTO: 0.8 % — SIGNIFICANT CHANGE UP (ref 0–2)
BILIRUB SERPL-MCNC: 0.2 MG/DL — SIGNIFICANT CHANGE UP (ref 0.2–1.2)
BUN SERPL-MCNC: 12 MG/DL — SIGNIFICANT CHANGE UP (ref 7–23)
CALCIUM SERPL-MCNC: 9.7 MG/DL — SIGNIFICANT CHANGE UP (ref 8.4–10.5)
CHLORIDE SERPL-SCNC: 103 MMOL/L — SIGNIFICANT CHANGE UP (ref 96–108)
CO2 SERPL-SCNC: 26 MMOL/L — SIGNIFICANT CHANGE UP (ref 22–31)
CREAT SERPL-MCNC: 0.72 MG/DL — SIGNIFICANT CHANGE UP (ref 0.5–1.3)
EOSINOPHIL # BLD AUTO: 0.2 K/UL — SIGNIFICANT CHANGE UP (ref 0–0.5)
EOSINOPHIL NFR BLD AUTO: 3.4 % — SIGNIFICANT CHANGE UP (ref 0–6)
GLUCOSE SERPL-MCNC: 116 MG/DL — HIGH (ref 70–99)
HCT VFR BLD CALC: 36.4 % — SIGNIFICANT CHANGE UP (ref 34.5–45)
HGB BLD-MCNC: 12.5 G/DL — SIGNIFICANT CHANGE UP (ref 11.5–15.5)
INR BLD: 1.82 RATIO — HIGH (ref 0.88–1.16)
LYMPHOCYTES # BLD AUTO: 1.8 K/UL — SIGNIFICANT CHANGE UP (ref 1–3.3)
LYMPHOCYTES # BLD AUTO: 27.5 % — SIGNIFICANT CHANGE UP (ref 13–44)
MCHC RBC-ENTMCNC: 30 PG — SIGNIFICANT CHANGE UP (ref 27–34)
MCHC RBC-ENTMCNC: 34.4 GM/DL — SIGNIFICANT CHANGE UP (ref 32–36)
MCV RBC AUTO: 87.3 FL — SIGNIFICANT CHANGE UP (ref 80–100)
MONOCYTES # BLD AUTO: 0.6 K/UL — SIGNIFICANT CHANGE UP (ref 0–0.9)
MONOCYTES NFR BLD AUTO: 8.6 % — SIGNIFICANT CHANGE UP (ref 2–14)
NEUTROPHILS # BLD AUTO: 3.8 K/UL — SIGNIFICANT CHANGE UP (ref 1.8–7.4)
NEUTROPHILS NFR BLD AUTO: 59.7 % — SIGNIFICANT CHANGE UP (ref 43–77)
PLATELET # BLD AUTO: 276 K/UL — SIGNIFICANT CHANGE UP (ref 150–400)
POTASSIUM SERPL-MCNC: 4 MMOL/L — SIGNIFICANT CHANGE UP (ref 3.5–5.3)
POTASSIUM SERPL-SCNC: 4 MMOL/L — SIGNIFICANT CHANGE UP (ref 3.5–5.3)
PROT SERPL-MCNC: 7.2 G/DL — SIGNIFICANT CHANGE UP (ref 6–8.3)
PROTHROM AB SERPL-ACNC: 21.1 SEC — HIGH (ref 10–12.9)
RBC # BLD: 4.17 M/UL — SIGNIFICANT CHANGE UP (ref 3.8–5.2)
RBC # FLD: 13.6 % — SIGNIFICANT CHANGE UP (ref 10.3–14.5)
SODIUM SERPL-SCNC: 142 MMOL/L — SIGNIFICANT CHANGE UP (ref 135–145)
WBC # BLD: 6.4 K/UL — SIGNIFICANT CHANGE UP (ref 3.8–10.5)
WBC # FLD AUTO: 6.4 K/UL — SIGNIFICANT CHANGE UP (ref 3.8–10.5)

## 2019-03-13 PROCEDURE — 93971 EXTREMITY STUDY: CPT

## 2019-03-13 PROCEDURE — 99284 EMERGENCY DEPT VISIT MOD MDM: CPT | Mod: 25

## 2019-03-13 PROCEDURE — 93971 EXTREMITY STUDY: CPT | Mod: 26

## 2019-03-13 PROCEDURE — 80053 COMPREHEN METABOLIC PANEL: CPT

## 2019-03-13 PROCEDURE — 99284 EMERGENCY DEPT VISIT MOD MDM: CPT

## 2019-03-13 PROCEDURE — 85027 COMPLETE CBC AUTOMATED: CPT

## 2019-03-13 PROCEDURE — 85610 PROTHROMBIN TIME: CPT

## 2019-03-13 PROCEDURE — 85730 THROMBOPLASTIN TIME PARTIAL: CPT

## 2019-03-13 NOTE — ED ADULT NURSE NOTE - OBJECTIVE STATEMENT
72 y/o female presenting to ED for right arm DVT found on US at PCP office today. Upon exam pt A&Ox3 gross neuro intact, lungs cta bilaterally, no difficulty speaking in complete sentences, s1s2 heart sounds heard, pulses x 4, abdomen soft nontender nondistended, bruising noted right forearm, skin intact. Pt denies chest pain, sob, ha, n/v/d, abdominal pain, f/c, urinary symptoms, hematuria.
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
## 2019-03-13 NOTE — ED PROVIDER NOTE - NSFOLLOWUPINSTRUCTIONS_ED_ALL_ED_FT
Please refer to the attached information on superficial thrombophlebitis.     Please use warm compressed to help the bruising heal.     Please use tylenol for pain you are having.     Please follow up with your PMD in the next few days.
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
## 2019-03-13 NOTE — ED PROVIDER NOTE - NS ED ROS FT
Constitution: No Fever or chills  Eyes: No visual changes  HEENT: No URI symptoms  Cardio: No Chest pain  Resp: No SOB  GI: No abdominal pain  : No dysuria  MSK: No Back pain  Neuro: No Headache  Skin: +bruising on R arm   All other ROS as per HPI  Jose Alfredo Mattson, PGY-1

## 2019-03-13 NOTE — ED PROVIDER NOTE - PHYSICAL EXAMINATION
General: Well developed, well nourished, no acute distress   HEENT: Normocephalic and atraumatic, EOMI, Trachea midline.   Cardiac: Normal S1 and S2 w/ RRR. No MRG.  Pulmonary: CTA bilaterally. No increased WOB.   Abdominal: Soft, NTND  Neurologic: Left sided weakness and word finding difficulties at baseline as per pt due to previous stroke. Sensation intact over R arm and hand, strength 5/5 in arm and hand     Musculoskeletal: No bony tenderness.  Vascular: Brachial and radial pulse 2+. Warm and well perfused  Skin: Ecchymosis and bruising over lateral ulnar and ventral aspect of R forearm. Tenderness over bruised skin.   Psychiatric: Appropriate mood and affect. No apparent risk to self or others.  Jose Alfredo Mattson, PGY-1

## 2019-03-20 ENCOUNTER — APPOINTMENT (OUTPATIENT)
Dept: VASCULAR SURGERY | Facility: CLINIC | Age: 74
End: 2019-03-20
Payer: MEDICARE

## 2019-03-20 VITALS
WEIGHT: 91 LBS | DIASTOLIC BLOOD PRESSURE: 75 MMHG | SYSTOLIC BLOOD PRESSURE: 127 MMHG | HEART RATE: 97 BPM | HEIGHT: 59 IN | TEMPERATURE: 98.3 F | BODY MASS INDEX: 18.35 KG/M2

## 2019-03-20 PROCEDURE — 99202 OFFICE O/P NEW SF 15 MIN: CPT

## 2019-04-08 ENCOUNTER — APPOINTMENT (OUTPATIENT)
Dept: VASCULAR SURGERY | Facility: CLINIC | Age: 74
End: 2019-04-08
Payer: MEDICARE

## 2019-04-08 ENCOUNTER — APPOINTMENT (OUTPATIENT)
Dept: VASCULAR SURGERY | Facility: CLINIC | Age: 74
End: 2019-04-08

## 2019-04-08 VITALS
WEIGHT: 91 LBS | TEMPERATURE: 98.4 F | BODY MASS INDEX: 18.35 KG/M2 | SYSTOLIC BLOOD PRESSURE: 123 MMHG | HEIGHT: 59 IN | HEART RATE: 103 BPM | DIASTOLIC BLOOD PRESSURE: 75 MMHG

## 2019-04-08 DIAGNOSIS — T14.8XXA OTHER INJURY OF UNSPECIFIED BODY REGION, INITIAL ENCOUNTER: ICD-10-CM

## 2019-04-08 PROCEDURE — 99212 OFFICE O/P EST SF 10 MIN: CPT

## 2019-04-11 ENCOUNTER — APPOINTMENT (OUTPATIENT)
Dept: PHYSICAL MEDICINE AND REHAB | Facility: CLINIC | Age: 74
End: 2019-04-11
Payer: MEDICARE

## 2019-04-11 VITALS
DIASTOLIC BLOOD PRESSURE: 70 MMHG | TEMPERATURE: 97.4 F | OXYGEN SATURATION: 93 % | SYSTOLIC BLOOD PRESSURE: 137 MMHG | HEART RATE: 83 BPM

## 2019-04-11 PROCEDURE — 64644 CHEMODENERV 1 EXTREM 5/> MUS: CPT

## 2019-04-11 PROCEDURE — 64643 CHEMODENERV 1 EXTREM 1-4 EA: CPT

## 2019-04-11 PROCEDURE — 95874 GUIDE NERV DESTR NEEDLE EMG: CPT

## 2019-04-11 RX ORDER — BUPROPION HYDROCHLORIDE 100 MG/1
100 TABLET, FILM COATED, EXTENDED RELEASE ORAL
Refills: 0 | Status: ACTIVE | COMMUNITY

## 2019-05-16 ENCOUNTER — APPOINTMENT (OUTPATIENT)
Dept: PHYSICAL MEDICINE AND REHAB | Facility: CLINIC | Age: 74
End: 2019-05-16
Payer: MEDICARE

## 2019-05-16 VITALS
TEMPERATURE: 97.4 F | SYSTOLIC BLOOD PRESSURE: 144 MMHG | HEART RATE: 102 BPM | DIASTOLIC BLOOD PRESSURE: 85 MMHG | OXYGEN SATURATION: 97 %

## 2019-05-16 PROCEDURE — 99213 OFFICE O/P EST LOW 20 MIN: CPT

## 2019-05-16 RX ORDER — ONABOTULINUMTOXINA 100 [USP'U]/1
100 INJECTION, POWDER, LYOPHILIZED, FOR SOLUTION INTRADERMAL; INTRAMUSCULAR
Qty: 5 | Refills: 0 | Status: ACTIVE | OUTPATIENT
Start: 2019-05-16

## 2019-05-16 NOTE — HISTORY OF PRESENT ILLNESS
[FreeTextEntry1] : Patient is a 73-year-old female history of left spastic hemiparesis/aphasia who underwent a Botox injection on April 11, 2019. Patient and  report excellent tone reduction at the wrist. Patient able to comfortably stretch her fingers and she's tolerating her resting hand splint.  does report able to make weak grasp. Good tone reduction noted at the ankle plantar flexors and patient is tolerating her SAFO. Patient ambulates with a SAC/AFO with supervision. Patient requires assistance with dressing.

## 2019-05-16 NOTE — PHYSICAL EXAM
[FreeTextEntry1] : General: Well developed female in no apparent distress. Patient is awake, alert, follows two-step commands. Cooperative.\par \par Motor:\par Right upper extremity/right lower extremity: Tone normal, active range of motion within functional limits with 5/5 motor power throughout.\par \par Left upper extremity: Synergistic movement of the shoulder flexors/elbow flexors less than 3/5 motor power. No active in movement at hand or wrist\par Tone:\par Left elbow flexors MAS = 1.\par Left wrist flexors MAS = 1, passive extension to 40deg position \par Left FDS MAS= 1+ with a wrist flexed, MAS = 2 WE\par Left FDP MAS = 1 with a wrist flexed, MAS = 1+ with a wrist extended.\par Left FPL MAS = 0 with a wrist flexed, MAS = 1 with wrist extended.\par Left MCP flexors MAS = 0-1.\par Left pronators MAS = 1-1+\par \par Left lower extremity: Able to passively dorsiflexed ankles to 5deg above neutral with knee flexed/extended position.\par Left ankle plantar flexors MAS = 0-1\par \par Functional status: Patient ambulated with left AFO with shallow heel strike, min genu recurvatum with use of heel lift on left. Good heel strike after adjusting AFO into more dorsiflexion

## 2019-05-16 NOTE — REVIEW OF SYSTEMS
[Muscle Weakness] : muscle weakness [Difficulty Walking] : difficulty walking [Negative] : Gastrointestinal [Fever] : no fever [Skin Wound] : no skin wound

## 2019-05-16 NOTE — ASSESSMENT
[FreeTextEntry1] : Patient is a 73-year-old female history left spastic hemiparesis/aphasia who had excellent tone reduction with the new Botox injection protocol.  Will continue the Botox injection protocol of April 11, 2019 (5 vials). Patient's AFO adjusted into more dorsiflexion.

## 2019-06-13 ENCOUNTER — OUTPATIENT (OUTPATIENT)
Dept: OUTPATIENT SERVICES | Facility: HOSPITAL | Age: 74
LOS: 1 days | End: 2019-06-13
Payer: MEDICARE

## 2019-06-13 ENCOUNTER — APPOINTMENT (OUTPATIENT)
Dept: MAMMOGRAPHY | Facility: IMAGING CENTER | Age: 74
End: 2019-06-13
Payer: MEDICARE

## 2019-06-13 ENCOUNTER — APPOINTMENT (OUTPATIENT)
Dept: RADIOLOGY | Facility: IMAGING CENTER | Age: 74
End: 2019-06-13
Payer: MEDICARE

## 2019-06-13 DIAGNOSIS — Z00.8 ENCOUNTER FOR OTHER GENERAL EXAMINATION: ICD-10-CM

## 2019-06-13 PROCEDURE — 77067 SCR MAMMO BI INCL CAD: CPT

## 2019-06-13 PROCEDURE — 77067 SCR MAMMO BI INCL CAD: CPT | Mod: 26

## 2019-06-13 PROCEDURE — 77063 BREAST TOMOSYNTHESIS BI: CPT | Mod: 26

## 2019-06-13 PROCEDURE — 77080 DXA BONE DENSITY AXIAL: CPT

## 2019-06-13 PROCEDURE — 77080 DXA BONE DENSITY AXIAL: CPT | Mod: 26

## 2019-06-13 PROCEDURE — 77063 BREAST TOMOSYNTHESIS BI: CPT

## 2019-06-25 ENCOUNTER — EMERGENCY (EMERGENCY)
Facility: HOSPITAL | Age: 74
LOS: 1 days | Discharge: HOME | End: 2019-06-25
Attending: EMERGENCY MEDICINE
Payer: MEDICARE

## 2019-06-25 VITALS
HEIGHT: 60 IN | RESPIRATION RATE: 18 BRPM | DIASTOLIC BLOOD PRESSURE: 85 MMHG | HEART RATE: 89 BPM | OXYGEN SATURATION: 96 % | TEMPERATURE: 98 F | WEIGHT: 89.95 LBS | SYSTOLIC BLOOD PRESSURE: 138 MMHG

## 2019-06-25 PROCEDURE — 72125 CT NECK SPINE W/O DYE: CPT

## 2019-06-25 PROCEDURE — 99284 EMERGENCY DEPT VISIT MOD MDM: CPT | Mod: 25

## 2019-06-25 PROCEDURE — 72125 CT NECK SPINE W/O DYE: CPT | Mod: 26

## 2019-06-25 PROCEDURE — 70450 CT HEAD/BRAIN W/O DYE: CPT | Mod: 26

## 2019-06-25 PROCEDURE — 70450 CT HEAD/BRAIN W/O DYE: CPT

## 2019-06-25 PROCEDURE — 29125 APPL SHORT ARM SPLINT STATIC: CPT | Mod: LT

## 2019-06-25 PROCEDURE — 73110 X-RAY EXAM OF WRIST: CPT

## 2019-06-25 PROCEDURE — 73130 X-RAY EXAM OF HAND: CPT

## 2019-06-25 PROCEDURE — 73110 X-RAY EXAM OF WRIST: CPT | Mod: 26,LT

## 2019-06-25 PROCEDURE — 73130 X-RAY EXAM OF HAND: CPT | Mod: 26,LT

## 2019-06-25 PROCEDURE — 99284 EMERGENCY DEPT VISIT MOD MDM: CPT

## 2019-06-25 NOTE — ED PROVIDER NOTE - PHYSICAL EXAMINATION
GENERAL: no acute distress, non-toxic appearing, contracted  HEAD: normocephalic, atraumatic  HEENT: normal conjunctiva, oral mucosa moist, neck supple  CARDIAC: regular rate and rhythm, normal S1 and S2,  no appreciable murmurs  PULM: clear to ascultation bilaterally, no crackles, rales, rhonchi, or wheezing  GI: abdomen nondistended, soft, nontender, no guarding or rebound tenderness  NEURO: alert and oriented x 3, normal speech, PERRLA, EOMI, no focal motor or sensory deficits, nonantalgic gait  MSK:   SKIN: no visible rashes, dry, well-perfused  PSYCH: appropriate mood and affect GENERAL: no acute distress, non-toxic appearing, contracted  HEAD: small area of ecchymosis above left eyebrow  HEENT: normal conjunctiva, oral mucosa moist, neck supple  CARDIAC: regular rate and rhythm, normal S1 and S2,  no appreciable murmurs  PULM: clear to ascultation bilaterally, no crackles, rales, rhonchi, or wheezing  GI: abdomen nondistended, soft, nontender, no guarding or rebound tenderness  NEURO: alert and oriented x 3, normal speech, PERRLA, EOMI, left sided arm and leg weakness (residual from stroke)  MSK: no C-spine midline tenderness; TTP over left distal wrist; left snuffbox; strong radial pulse; no pelvic instability  SKIN: no visible rashes, dry, well-perfused  PSYCH: appropriate mood and affect

## 2019-06-25 NOTE — ED ADULT NURSE NOTE - OBJECTIVE STATEMENT
74 year old female A&OX4 presents with left wrist pain and discoloration after falling. Patient states that she slipped and fell off the couch landing on the wrist and hitting her head. Patient did not lose consciousness. Left wrist has ecchymosis present and is tender to palpation. Patient also has snuff box tenderness present on exam. Limited range of motion present on exam. Denies headache, vision changes, nausea, vomiting.

## 2019-06-25 NOTE — ED PROCEDURE NOTE - CPROC ED POST PROC CARE GUIDE1
Elevate the injured extremity as instructed./Instructed patient/caregiver regarding signs and symptoms of infection./Verbal/written post procedure instructions were given to patient/caregiver./Keep the cast/splint/dressing clean and dry.

## 2019-06-25 NOTE — ED PROCEDURE NOTE - NS ED PERI VASCULAR NEG
no paresthesia/capillary refill time < 2 seconds/no cyanosis of extremity/no swelling/fingers/toes warm to touch

## 2019-06-25 NOTE — ED PROVIDER NOTE - NSFOLLOWUPCLINICS_GEN_ALL_ED_FT
[facility] Orthopedic [facility]  Orthopedic Surgery  [address], 3rd & 4th [place] [address]  Phone: [phone]  Fax:   Follow Up Time:

## 2019-06-25 NOTE — ED PROVIDER NOTE - PROGRESS NOTE DETAILS
STEPHANIA Bass MD: Pt with some L wrist snuffbox ttp, was placed in a thumb spica spint and provided with Hand Surgery f/u within a week with return precautions

## 2019-06-25 NOTE — ED PROVIDER NOTE - NSFOLLOWUPINSTRUCTIONS_ED_ALL_ED_FT
Your diagnosis: wrist pain    Discharge instructions:    - Please follow up with an orthopedic doctor within 5-7 days of discharge. If you like, you can follow-up with Dr. Rnee Perez [phone].    - Tylenol up to 650 mg every 8 hours as needed for pain and/or Motrin up to 600 mg every 6 hours as needed for pain. Take any prescribed medications as instructed:    - Others:    - Be sure to return to the ED if you develop new or worsening symptoms. Specific signs and symptoms to be vigilant of: leg numbness or tingling, leg weakness, fecal or urinary incontinence or retention, shooting pains down the back of the legs, fever, chills, prolonged or worsening back pain, inflammation of your joints, difficulty moving your joints, difficulty walking. Your diagnosis:    Discharge instructions:    - Please follow up in 5-7 days with an orthopedic doctor within 5-7 days of discharge. If you like, you can follow-up with Dr. Rene Perez [phone].    - Keep splint completely dry up until your follow-up visit. For the first 1-3 days, elevate extremity as much as you can and apply ice to it 2-4 times per day for a maximum 15-20 minutes each time. Use crutches or a cane to help you ambulate if needed.    - Tylenol up to 650 mg every 8 hours as needed for pain and/or Motrin up to 600 mg every 6 hours as needed for pain. Take any prescribed medications as instructed:     - Be sure to return to the ED if you develop new or worsening symptoms. Specific signs and symptoms to be vigilant of: increasing or intractable pain that does not improve with a short trial of elevation and ice, burning or stinging pain, new numbness or tingling, skin findings of any new warmth, redness, discoloration, breakdown, or discharge (clear or purulent) from under or near the splinted area, increasing paralysis, malodorous splint, fevers or chills, nausea or vomiting, persistent lightheadedness. Your diagnosis:    Discharge instructions:    - Please follow up in 5-7 days with an orthopedic doctor within 5-7 days of discharge. If you like, you can follow-up with Dr. Rene Perez [phone]. Please let the doctor know that the patient was put in a thumb spica splint because there was still tenderness over the snuffbox area of the thumb.    - Keep splint completely dry up until your follow-up visit. For the first 1-3 days, elevate extremity as much as you can and apply ice to it 2-4 times per day for a maximum 15-20 minutes each time. Use crutches or a cane to help you ambulate if needed.    - Tylenol up to 650 mg every 8 hours as needed for pain and/or Motrin up to 600 mg every 6 hours as needed for pain. Take any prescribed medications as instructed:     - Be sure to return to the ED if you develop new or worsening symptoms. Specific signs and symptoms to be vigilant of: increasing or intractable pain that does not improve with a short trial of elevation and ice, burning or stinging pain, new numbness or tingling, skin findings of any new warmth, redness, discoloration, breakdown, or discharge (clear or purulent) from under or near the splinted area, increasing paralysis, malodorous splint, fevers or chills, nausea or vomiting, persistent lightheadedness.

## 2019-06-25 NOTE — ED PROVIDER NOTE - CLINICAL SUMMARY MEDICAL DECISION MAKING FREE TEXT BOX
74F with hx of COPD, HLD, Afib (on Coumadin), stroke (residual left arm and leg weakness) presenting after a mechanical fall with left wrist pain. Given AC, will scan head and neck. Will also XR hand and wrist. Pain control. 74F with hx of COPD, HLD, Afib (on Coumadin), stroke (residual left arm and leg weakness) presenting after a mechanical fall with left wrist pain. Given AC, will scan head and neck. Will also XR hand and wrist. Pain control.    STEPHANIA Bass MD: Pt is a 75 y/o female with PMH of COPD, HLD, Afib (on Coumadin), stroke with residual left arm and leg weakness who presenting after a mechanical fall with left wrist pain. Fall occurred 2 days ago, unwitnessed. Per , and patient, pt slid out of a chair and fell onto the floor, onto her L side. No LOC, + head trauma. They state that this does sometimes happen to her.  +bruising to left forehead. Denies headache, nausea, vomiting, blurry vision, new motor or sensory changes. Normally ambulates with a cane. Today, when doing L hand exercised to help with chronic L hand contractures, pt c/o pain. Denies new numbness / weakness. On exam, no snuffbox ttp to L hand, +distal ulnar ttp with some mild swelling. +contracture to L hand with flexed digits. No other hand ttp or any ttp or pain on ROM of L elbow. DDx: fx vs. sprain vs. contusion. Plan: XR L hand, wrist, CTH to r/o ICH given that pt is on coumadin. Pt does not want any pain medicine at this time

## 2019-06-25 NOTE — ED PROVIDER NOTE - OBJECTIVE STATEMENT
74F with hx of COPD, HLD, Afib (on Coumadin), stroke (residual left arm and leg weakness) presenting after a mechanical fall with left wrist pain. Patient lives at home with  who found her on lying on the ground on her left side. Patient hit her head and has a bump over her left forehead. Denies headache, nausea, vomiting, blurry vision, new motor or sensory changes. Normally ambulates with a cane.

## 2019-06-25 NOTE — ED PROVIDER NOTE - NS ED ROS FT
GENERAL: no fever, chills  HEENT: no cough, congestion, odynophagia, dysphagia  CARDIAC: no chest pain, palpitations, lightheadedness  PULM: no dyspnea, wheezing   GI: no abdominal pain, nausea, vomiting, diarrhea, constipation, melena, hematochezia  : no urinary dysuria, frequency, incontinence, hematuria  NEURO: no headache, motor weakness, sensory changes  MSK: + left wrist pain  SKIN: no rashes, hives  HEME: no active bleeding, bruising

## 2019-06-27 ENCOUNTER — APPOINTMENT (OUTPATIENT)
Dept: ULTRASOUND IMAGING | Facility: IMAGING CENTER | Age: 74
End: 2019-06-27
Payer: MEDICARE

## 2019-06-27 ENCOUNTER — OUTPATIENT (OUTPATIENT)
Dept: OUTPATIENT SERVICES | Facility: HOSPITAL | Age: 74
LOS: 1 days | End: 2019-06-27
Payer: MEDICARE

## 2019-06-27 ENCOUNTER — APPOINTMENT (OUTPATIENT)
Dept: MAMMOGRAPHY | Facility: IMAGING CENTER | Age: 74
End: 2019-06-27
Payer: MEDICARE

## 2019-06-27 DIAGNOSIS — R92.8 OTHER ABNORMAL AND INCONCLUSIVE FINDINGS ON DIAGNOSTIC IMAGING OF BREAST: ICD-10-CM

## 2019-06-27 PROCEDURE — 77066 DX MAMMO INCL CAD BI: CPT | Mod: 26

## 2019-06-27 PROCEDURE — G0279: CPT | Mod: 26

## 2019-06-27 PROCEDURE — G0279: CPT

## 2019-06-27 PROCEDURE — 76641 ULTRASOUND BREAST COMPLETE: CPT | Mod: 26,50

## 2019-06-27 PROCEDURE — 76641 ULTRASOUND BREAST COMPLETE: CPT

## 2019-06-27 PROCEDURE — 77066 DX MAMMO INCL CAD BI: CPT

## 2019-07-25 ENCOUNTER — APPOINTMENT (OUTPATIENT)
Dept: PHYSICAL MEDICINE AND REHAB | Facility: CLINIC | Age: 74
End: 2019-07-25

## 2019-09-04 ENCOUNTER — APPOINTMENT (OUTPATIENT)
Dept: PHYSICAL MEDICINE AND REHAB | Facility: CLINIC | Age: 74
End: 2019-09-04
Payer: MEDICARE

## 2019-09-04 ENCOUNTER — CHART COPY (OUTPATIENT)
Age: 74
End: 2019-09-04

## 2019-09-04 VITALS — OXYGEN SATURATION: 95 % | HEART RATE: 84 BPM | SYSTOLIC BLOOD PRESSURE: 151 MMHG | DIASTOLIC BLOOD PRESSURE: 83 MMHG

## 2019-09-04 PROCEDURE — 64643 CHEMODENERV 1 EXTREM 1-4 EA: CPT

## 2019-09-04 PROCEDURE — 95874 GUIDE NERV DESTR NEEDLE EMG: CPT

## 2019-09-04 PROCEDURE — 64644 CHEMODENERV 1 EXTREM 5/> MUS: CPT

## 2019-10-01 ENCOUNTER — CHART COPY (OUTPATIENT)
Age: 74
End: 2019-10-01

## 2019-10-15 ENCOUNTER — APPOINTMENT (OUTPATIENT)
Dept: PHYSICAL MEDICINE AND REHAB | Facility: CLINIC | Age: 74
End: 2019-10-15
Payer: MEDICARE

## 2019-10-15 VITALS — HEART RATE: 87 BPM | OXYGEN SATURATION: 92 % | SYSTOLIC BLOOD PRESSURE: 155 MMHG | DIASTOLIC BLOOD PRESSURE: 81 MMHG

## 2019-10-15 PROCEDURE — 99213 OFFICE O/P EST LOW 20 MIN: CPT

## 2019-10-15 RX ORDER — ONABOTULINUMTOXINA 100 [USP'U]/1
100 INJECTION, POWDER, LYOPHILIZED, FOR SOLUTION INTRADERMAL; INTRAMUSCULAR
Qty: 5 | Refills: 0 | Status: ACTIVE | OUTPATIENT
Start: 2019-10-15

## 2019-10-15 NOTE — REVIEW OF SYSTEMS
[Muscle Weakness] : muscle weakness [Difficulty Walking] : difficulty walking [Negative] : Respiratory [Fever] : no fever [Skin Wound] : no skin wound [Joint Pain] : no joint pain

## 2019-10-15 NOTE — PHYSICAL EXAM
[FreeTextEntry1] : General: Well developed female in no apparent distress. Patient is awake, alert, follows two-step commands. Cooperative.\par \par Motor:\par Right upper extremity/right lower extremity: Tone normal, active range of motion within functional limits with 5/5 motor power throughout.\par \par Left upper extremity: Synergistic movement of the shoulder flexors/elbow flexors less than 3/5 motor power. No active in movement at hand or wrist\par Tone:\par Left elbow flexors MAS = 1.\par Left wrist flexors MAS = 0-1, passive extension to 50deg position \par Left FDS MAS= 0 with a wrist flexed for digit 2+5, MAS=2 digit 3+4. MAS = 2+ WE digit 3, dinorah at DIP of digit 3\par Left FDP MAS = 1 with a wrist flexed, MAS = 1+ with a wrist extended.\par Left FPL MAS = 0 with a wrist flexed, MAS = 1 with wrist extended.\par Left MCP flexors MAS = 0-1.\par Left pronators MAS = 0-1\par \par Left lower extremity: Able to passively dorsiflexed ankles to 5-10deg above neutral with knee flexed/extended position.\par Left ankle plantar flexors MAS = 0-1\par \par Functional status: Patient ambulated with left AFO with good heel strike, min/no genu recurvatum with use of heel lift on left.

## 2019-10-15 NOTE — ASSESSMENT
[FreeTextEntry1] : Patient is a 74-year-old female history left spastic hemiparesis/aphasia who had excellent tone reduction with the new Botox injection protocol.  Will continue the Botox injection protocol of Sept 4, 2019 (5 vials). Will consider increase to FDP at time of injection. Demonstrated long finger flexion stretch to patient/.

## 2019-10-15 NOTE — HISTORY OF PRESENT ILLNESS
[FreeTextEntry1] : Patient is a 74-year-old female history of left spastic hemiparesis/aphasia who underwent a Botox injection on Sept. 4, 2019. Patient and  report excellent tone reduction at the wrist. Patient able to comfortably stretch her fingers and she's tolerating her resting hand splint.  does report maintained her hand . Good tone reduction noted at the ankle plantar flexors and patient is tolerating her AFO. Patient ambulates with a SAC/AFO with supervision. Patient requires assistance with dressing. Her AFO was revised and fits well.

## 2019-12-04 ENCOUNTER — APPOINTMENT (OUTPATIENT)
Dept: PHYSICAL MEDICINE AND REHAB | Facility: CLINIC | Age: 74
End: 2019-12-04
Payer: MEDICARE

## 2019-12-04 VITALS — HEART RATE: 93 BPM | DIASTOLIC BLOOD PRESSURE: 86 MMHG | SYSTOLIC BLOOD PRESSURE: 154 MMHG | OXYGEN SATURATION: 95 %

## 2019-12-04 PROCEDURE — 99213 OFFICE O/P EST LOW 20 MIN: CPT

## 2019-12-04 RX ORDER — ONABOTULINUMTOXINA 100 [USP'U]/1
100 INJECTION, POWDER, LYOPHILIZED, FOR SOLUTION INTRADERMAL; INTRAMUSCULAR
Qty: 5 | Refills: 0 | Status: ACTIVE | OUTPATIENT
Start: 2019-12-04

## 2019-12-04 NOTE — ASSESSMENT
[FreeTextEntry1] : Patient is a 74-year-old female history left spastic hemiparesis/aphasia who had excellent tone reduction with the new Botox injection protocol.  Will continue the Botox injection protocol of Sept 4, 2019 (5 vials). Will consider increase to FDP or PT at time of injection.

## 2019-12-04 NOTE — HISTORY OF PRESENT ILLNESS
[FreeTextEntry1] : Patient is a 74-year-old female history of left spastic hemiparesis/aphasia who underwent a Botox injection on Sept. 4, 2019. Patient and  report continued excellent tone reduction at the wrist/hand. Patient able to comfortably stretch her fingers and she's tolerating her resting hand splint. Good tone reduction noted at the ankle plantar flexors and patient is tolerating her AFO. Patient ambulates with a SAC/AFO with supervision. Patient requires assistance with dressing. No falls reported.

## 2019-12-04 NOTE — PHYSICAL EXAM
[FreeTextEntry1] : General: Well developed female in no apparent distress. Patient is awake, alert, follows two-step commands. Cooperative.\par \par Motor:\par Right upper extremity/right lower extremity: Tone normal, active range of motion within functional limits with 5/5 motor power throughout.\par \par Left upper extremity: Synergistic movement of the shoulder flexors/elbow flexors less than 3/5 motor power. No active in movement at hand or wrist\par Tone:\par Left elbow flexors MAS = 1.\par Left wrist flexors MAS = 1, passive extension to 50deg position \par Left FDS MAS= 0-1 with a wrist flexed for digit 2+5, MAS=2 digit 3+4. MAS = 2 WE digit 3, dinorah at DIP of digit 3\par Left FDP MAS = 1 with a wrist flexed, MAS = 1+ with a wrist extended.\par Left FPL MAS = 0 with a wrist flexed, MAS = 1 with wrist extended.\par Left MCP flexors MAS = 0-1.\par Left pronators MAS = 1, passive suppination to 60-70deg\par \par Left lower extremity: Able to passively dorsiflexed ankles to 5-10deg above neutral with knee flexed/extended position.\par Left ankle plantar flexors MAS = 0-1\par \par Functional status: Patient ambulated with left AFO with good heel strike, no genu recurvatum with use of heel lift on left.

## 2019-12-04 NOTE — REVIEW OF SYSTEMS
[Muscle Weakness] : muscle weakness [Difficulty Walking] : difficulty walking [Negative] : Respiratory [Fever] : no fever [Joint Pain] : no joint pain [Skin Wound] : no skin wound

## 2019-12-17 ENCOUNTER — APPOINTMENT (OUTPATIENT)
Dept: PHYSICAL MEDICINE AND REHAB | Facility: CLINIC | Age: 74
End: 2019-12-17

## 2020-01-29 ENCOUNTER — APPOINTMENT (OUTPATIENT)
Dept: PHYSICAL MEDICINE AND REHAB | Facility: CLINIC | Age: 75
End: 2020-01-29
Payer: MEDICARE

## 2020-01-29 VITALS
DIASTOLIC BLOOD PRESSURE: 92 MMHG | OXYGEN SATURATION: 93 % | SYSTOLIC BLOOD PRESSURE: 151 MMHG | HEART RATE: 105 BPM | TEMPERATURE: 97.4 F

## 2020-01-29 PROCEDURE — 95874 GUIDE NERV DESTR NEEDLE EMG: CPT

## 2020-01-29 PROCEDURE — 64643 CHEMODENERV 1 EXTREM 1-4 EA: CPT

## 2020-01-29 PROCEDURE — 64644 CHEMODENERV 1 EXTREM 5/> MUS: CPT

## 2020-02-06 ENCOUNTER — OUTPATIENT (OUTPATIENT)
Dept: OUTPATIENT SERVICES | Facility: HOSPITAL | Age: 75
LOS: 1 days | End: 2020-02-06
Payer: MEDICARE

## 2020-02-06 ENCOUNTER — APPOINTMENT (OUTPATIENT)
Dept: MAMMOGRAPHY | Facility: IMAGING CENTER | Age: 75
End: 2020-02-06
Payer: MEDICARE

## 2020-02-06 DIAGNOSIS — Z00.8 ENCOUNTER FOR OTHER GENERAL EXAMINATION: ICD-10-CM

## 2020-02-06 PROCEDURE — G0279: CPT | Mod: 26

## 2020-02-06 PROCEDURE — G0279: CPT

## 2020-02-06 PROCEDURE — 77066 DX MAMMO INCL CAD BI: CPT

## 2020-02-06 PROCEDURE — 77066 DX MAMMO INCL CAD BI: CPT | Mod: 26

## 2020-02-12 ENCOUNTER — RECORD ABSTRACTING (OUTPATIENT)
Age: 75
End: 2020-02-12

## 2020-02-12 VITALS — HEIGHT: 60 IN | BODY MASS INDEX: 17.67 KG/M2 | WEIGHT: 90 LBS

## 2020-02-12 DIAGNOSIS — Z12.2 ENCOUNTER FOR SCREENING FOR MALIGNANT NEOPLASM OF RESPIRATORY ORGANS: ICD-10-CM

## 2020-02-12 DIAGNOSIS — Z86.73 PERSONAL HISTORY OF TRANSIENT ISCHEMIC ATTACK (TIA), AND CEREBRAL INFARCTION W/OUT RESIDUAL DEFICITS: ICD-10-CM

## 2020-02-12 NOTE — PLAN
[Smoking Cessation Guidance Provided] : Smoking cessation guidance was provided to patient [Smoking Cessation] : smoking cessation [Age appropriate screenings] : Age appropriate screenings [Regular Exercise] : regular exercise [Regular follow-up with healthcare provider] : regular follow-up with healthcare provider [FreeTextEntry1] : Her LDCT is scheduled for 2/13/20 at the [facility] location.

## 2020-02-12 NOTE — REASON FOR VISIT
[Initial Evaluation] : an initial evaluation visit [Review of Eligibility] : review of eligibility [Low-Dose CT Screening Discussion] : low-dose CT lung cancer screening discussion

## 2020-02-12 NOTE — HISTORY OF PRESENT ILLNESS
[Former] : former smoker [TextBox_13] : She denies hemoptysis, denies new cough, denies unexplained weight loss.  She is a former smoker with a 51 pack year smoking history (1PPD x 51 years).  She quit 5 years ago.  She denies family h/o lung cancer.  She is referred by Dr. Omari Donahue.  This is a virtual visit, done via telephone.

## 2020-02-12 NOTE — ASSESSMENT
[Discussed Risks and Advised to Quit Smoking] : Discussed risks and advised to quit smoking [Discussed Cessation Medication] : cessation medication was discussed [Discussed Cessation Strategies] : cessation strategies were discussed [Maintenance] : Maintenance: The patient has quit for more than 6 months

## 2020-02-13 ENCOUNTER — OUTPATIENT (OUTPATIENT)
Dept: OUTPATIENT SERVICES | Facility: HOSPITAL | Age: 75
LOS: 1 days | End: 2020-02-13
Payer: MEDICARE

## 2020-02-13 ENCOUNTER — APPOINTMENT (OUTPATIENT)
Dept: CT IMAGING | Facility: IMAGING CENTER | Age: 75
End: 2020-02-13
Payer: MEDICARE

## 2020-02-13 DIAGNOSIS — Z00.8 ENCOUNTER FOR OTHER GENERAL EXAMINATION: ICD-10-CM

## 2020-02-13 PROCEDURE — G0297: CPT | Mod: 26

## 2020-02-13 PROCEDURE — G0297: CPT

## 2020-02-19 NOTE — PROGRESS NOTE ADULT - PROBLEM SELECTOR PROBLEM 2
Lasix, Heparin, and  gtt continue to infuse at MD ordered rate. Sternal precautions continue to be educated on and encouraged. Pt educated on fall risks overnight, Pt remained free from falls/trauma/injury. Pt remains in speciality bed, frequent weight shift is promoted and assisted to prevent skin breakdown. LVAD dressing change completed by mother overnight with RN supervision and guidance, see note for details. VSS, LVAD hum WDL. LVAD numbers WDL. Hematocrit updated. Denies any CP, SOB, palpitations, dizziness, pain and discomfort. Turning/repositioning independently in bed. Plan of care reviewed with patient and all questions answered, verbalizes understanding. No acute distress noted. Will continue to monitor.      GOPAL (acute kidney injury)

## 2020-03-18 ENCOUNTER — APPOINTMENT (OUTPATIENT)
Dept: PHYSICAL MEDICINE AND REHAB | Facility: CLINIC | Age: 75
End: 2020-03-18

## 2020-06-25 ENCOUNTER — APPOINTMENT (OUTPATIENT)
Dept: PHYSICAL MEDICINE AND REHAB | Facility: CLINIC | Age: 75
End: 2020-06-25
Payer: MEDICARE

## 2020-06-25 VITALS
TEMPERATURE: 95.5 F | HEART RATE: 85 BPM | SYSTOLIC BLOOD PRESSURE: 126 MMHG | DIASTOLIC BLOOD PRESSURE: 69 MMHG | OXYGEN SATURATION: 96 %

## 2020-06-25 PROCEDURE — 99213 OFFICE O/P EST LOW 20 MIN: CPT

## 2020-06-25 RX ORDER — ONABOTULINUMTOXINA 100 [USP'U]/1
100 INJECTION, POWDER, LYOPHILIZED, FOR SOLUTION INTRADERMAL; INTRAMUSCULAR
Qty: 5 | Refills: 0 | Status: ACTIVE | OUTPATIENT
Start: 2020-06-25

## 2020-06-25 NOTE — HISTORY OF PRESENT ILLNESS
[FreeTextEntry1] : Patient is a 75-year-old female history of left spastic hemiparesis/aphasia who underwent a Botox injection on Jan 29, 2020. Patient and  report continued excellent tone reduction at the wrist/hand. Patient able to comfortably stretch her fingers and she's tolerating her resting hand splint. Good tone reduction noted at the ankle plantar flexors and patient is tolerating her AFO. Patient ambulates with a SAC/AFO with supervision. Patient requires assistance with dressing. No falls reported.

## 2020-06-25 NOTE — ASSESSMENT
[FreeTextEntry1] : Patient is a 75-year-old female history left spastic hemiparesis/aphasia who had excellent tone reduction with the new Botox injection protocol.  Will decrease dose to FPL. New Botox injection protocol:\par \par Left FCR------------------------------- 50 units\par Left FCU------------------------------- 50 units\par Left FDS------------------------------- 75 units\par Left FDP------------------------------- 40 units\par Left FPL-------------------------------- 15 units\par Left lumbricals------------------------- 40 units (10 units x4)\par Left medial gastrocnemius----------- 70 units\par Left lateral gastrocnemius------------ 70 units\par Left soleus ------------------------------40 units\par \par Total-------------------------------------450 units

## 2020-06-25 NOTE — REVIEW OF SYSTEMS
[Muscle Weakness] : muscle weakness [Difficulty Walking] : difficulty walking [Negative] : Respiratory [Skin Wound] : no skin wound [Joint Pain] : no joint pain [Fever] : no fever

## 2020-06-25 NOTE — PHYSICAL EXAM
[FreeTextEntry1] : General: Well developed female in no apparent distress. Patient is awake, alert, follows two-step commands. Cooperative.\par \par Motor:\par Right upper extremity/right lower extremity: Tone normal, active range of motion within functional limits with 5/5 motor power throughout.\par \par Left upper extremity: Synergistic movement of the shoulder flexors/elbow flexors <3/5 motor power. No active in movement at hand or wrist\par Tone:\par Left elbow flexors MAS = 1.\par Left wrist flexors MAS = 2+-3, passive extension to 50deg position \par Left FDS MAS= 0-1 with a wrist flexed for digit 2+5, MAS=2 digit 3+4. MAS = 2 WE digit 3, dinorah at DIP of digit 3\par Left FDP MAS = 1 with a wrist flexed, digit 2/3 MAS = 1+ with a wrist extended. digit 4/5 MAS= 2+-3\par Left FPL MAS = 0 with a wrist flexed, MAS = 0-1 with wrist extended.\par Left MCP flexors MAS = 1.\par Left pronators MAS = 1, passive suppination to 60-70deg\par \par Left lower extremity: Able to passively dorsiflexed ankles to 5-10deg above neutral with knee flexed/extended position.\par Left ankle plantar flexors MAS = 0-1\par \par Functional status: Patient ambulated with left hinged AFO with good heel strike, min genu recurvatum with use of heel lift on left.

## 2020-07-02 ENCOUNTER — APPOINTMENT (OUTPATIENT)
Dept: PHYSICAL MEDICINE AND REHAB | Facility: CLINIC | Age: 75
End: 2020-07-02
Payer: MEDICARE

## 2020-07-02 VITALS
OXYGEN SATURATION: 96 % | TEMPERATURE: 97 F | HEART RATE: 81 BPM | DIASTOLIC BLOOD PRESSURE: 82 MMHG | SYSTOLIC BLOOD PRESSURE: 138 MMHG

## 2020-07-02 PROCEDURE — 95874 GUIDE NERV DESTR NEEDLE EMG: CPT

## 2020-07-02 PROCEDURE — 64644 CHEMODENERV 1 EXTREM 5/> MUS: CPT

## 2020-07-02 PROCEDURE — 64643 CHEMODENERV 1 EXTREM 1-4 EA: CPT

## 2020-08-06 ENCOUNTER — APPOINTMENT (OUTPATIENT)
Dept: PHYSICAL MEDICINE AND REHAB | Facility: CLINIC | Age: 75
End: 2020-08-06
Payer: MEDICARE

## 2020-08-06 VITALS
HEART RATE: 88 BPM | TEMPERATURE: 94.3 F | SYSTOLIC BLOOD PRESSURE: 136 MMHG | OXYGEN SATURATION: 98 % | DIASTOLIC BLOOD PRESSURE: 81 MMHG

## 2020-08-06 PROCEDURE — 99213 OFFICE O/P EST LOW 20 MIN: CPT | Mod: GC

## 2020-08-06 RX ORDER — ONABOTULINUMTOXINA 100 [USP'U]/1
100 INJECTION, POWDER, LYOPHILIZED, FOR SOLUTION INTRADERMAL; INTRAMUSCULAR
Qty: 5 | Refills: 0 | Status: ACTIVE | OUTPATIENT
Start: 2020-08-06

## 2020-08-06 NOTE — HISTORY OF PRESENT ILLNESS
[FreeTextEntry1] : Patient is a 75-year-old female history of left spastic hemiparesis/aphasia who underwent a Botox injection on July 2, 2020. Patient and  report continued excellent tone reduction at the wrist/hand. Patient able to comfortably stretch her fingers and she's tolerating her resting hand splint. Good tone reduction noted at the ankle plantar flexors and patient is tolerating her AFO. Patient ambulates with a SAC/AFO with supervision. Patient requires assistance with dressing. No falls reported.

## 2020-08-06 NOTE — ASSESSMENT
[FreeTextEntry1] : Patient is a 75-year-old female history left spastic hemiparesis/aphasia who had excellent tone reduction with the new Botox injection protocol.  Will continue the Botox protocol of July 2, 2020 (5 vials).

## 2020-08-06 NOTE — PHYSICAL EXAM
[FreeTextEntry1] : General: Well developed female in no apparent distress. Patient is awake, alert, follows two-step commands. Cooperative.\par \par Motor:\par Right upper extremity/right lower extremity: Tone normal, active range of motion within functional limits with 5/5 motor power throughout.\par \par Left upper extremity: Synergistic movement of the shoulder flexors/elbow flexors <3/5 motor power. No active in movement at hand or wrist\par Tone:\par Left elbow flexors MAS = 1.\par Left wrist flexors MAS = 1, passive extension to 70deg position \par Left FDS MAS= 0 with a wrist flexed for digit 2+5 MAS=0 digit 4 MAS = 2 WE, digit 3 MAS=1 WE\par Left FDP MAS = 0 with a wrist flexed, digit 3 MAS = 1+ with a wrist extended. digit 4/5 MAS=0\par Left FPL MAS = 0 with a wrist flexed, MAS = 0-1 with wrist extended.\par Left MCP flexors MAS =0.\par Left pronators MAS = 1, passive suppination to 60-70deg\par \par Left lower extremity: Able to passively dorsiflexed ankles to 5-10deg above neutral with knee flexed/extended position.\par Left ankle plantar flexors MAS = 0-1\par \par Functional status: Patient ambulated with left hinged AFO with good heel strike, min/no genu recurvatum with use of heel lift on left.

## 2020-08-06 NOTE — REVIEW OF SYSTEMS
[Muscle Weakness] : muscle weakness [Difficulty Walking] : difficulty walking [Negative] : Cardiovascular [Fever] : no fever [Joint Pain] : no joint pain [Skin Wound] : no skin wound

## 2021-01-11 ENCOUNTER — APPOINTMENT (OUTPATIENT)
Dept: PHYSICAL MEDICINE AND REHAB | Facility: CLINIC | Age: 76
End: 2021-01-11
Payer: MEDICARE

## 2021-01-11 VITALS
DIASTOLIC BLOOD PRESSURE: 80 MMHG | OXYGEN SATURATION: 95 % | TEMPERATURE: 97.8 F | HEART RATE: 76 BPM | SYSTOLIC BLOOD PRESSURE: 159 MMHG

## 2021-01-11 PROCEDURE — 99072 ADDL SUPL MATRL&STAF TM PHE: CPT

## 2021-01-11 PROCEDURE — 64644 CHEMODENERV 1 EXTREM 5/> MUS: CPT

## 2021-01-11 PROCEDURE — 95874 GUIDE NERV DESTR NEEDLE EMG: CPT

## 2021-01-11 PROCEDURE — 64643 CHEMODENERV 1 EXTREM 1-4 EA: CPT

## 2021-01-11 RX ORDER — ASPIRIN 81 MG/1
81 TABLET, COATED ORAL
Qty: 90 | Refills: 0 | Status: ACTIVE | COMMUNITY
Start: 2020-06-08

## 2021-01-11 RX ORDER — DILTIAZEM HYDROCHLORIDE 120 MG/1
120 CAPSULE, EXTENDED RELEASE ORAL
Qty: 90 | Refills: 0 | Status: ACTIVE | COMMUNITY
Start: 2020-06-08

## 2021-01-11 RX ORDER — LEVETIRACETAM 100 MG/ML
100 SOLUTION ORAL
Qty: 900 | Refills: 0 | Status: ACTIVE | COMMUNITY
Start: 2020-12-30

## 2021-01-11 RX ORDER — FLUTICASONE FUROATE AND VILANTEROL TRIFENATATE 100; 25 UG/1; UG/1
100-25 POWDER RESPIRATORY (INHALATION)
Qty: 180 | Refills: 0 | Status: ACTIVE | COMMUNITY
Start: 2020-06-29

## 2021-01-11 RX ORDER — ESCITALOPRAM OXALATE 10 MG/1
10 TABLET ORAL
Qty: 90 | Refills: 0 | Status: ACTIVE | COMMUNITY
Start: 2020-10-15

## 2021-01-27 ENCOUNTER — APPOINTMENT (OUTPATIENT)
Dept: RADIOLOGY | Facility: IMAGING CENTER | Age: 76
End: 2021-01-27
Payer: MEDICARE

## 2021-01-27 ENCOUNTER — OUTPATIENT (OUTPATIENT)
Dept: OUTPATIENT SERVICES | Facility: HOSPITAL | Age: 76
LOS: 1 days | End: 2021-01-27
Payer: MEDICARE

## 2021-01-27 DIAGNOSIS — Z00.8 ENCOUNTER FOR OTHER GENERAL EXAMINATION: ICD-10-CM

## 2021-01-27 PROCEDURE — 73110 X-RAY EXAM OF WRIST: CPT | Mod: 26,LT

## 2021-01-27 PROCEDURE — 73110 X-RAY EXAM OF WRIST: CPT

## 2021-01-27 PROCEDURE — 73130 X-RAY EXAM OF HAND: CPT

## 2021-01-27 PROCEDURE — 73130 X-RAY EXAM OF HAND: CPT | Mod: 26,LT

## 2021-01-28 ENCOUNTER — APPOINTMENT (OUTPATIENT)
Dept: PHYSICAL MEDICINE AND REHAB | Facility: CLINIC | Age: 76
End: 2021-01-28
Payer: MEDICARE

## 2021-01-28 VITALS
OXYGEN SATURATION: 99 % | TEMPERATURE: 93.7 F | SYSTOLIC BLOOD PRESSURE: 138 MMHG | DIASTOLIC BLOOD PRESSURE: 85 MMHG | HEART RATE: 88 BPM

## 2021-01-28 DIAGNOSIS — M19.032 PRIMARY OSTEOARTHRITIS, LEFT WRIST: ICD-10-CM

## 2021-01-28 PROCEDURE — 99213 OFFICE O/P EST LOW 20 MIN: CPT

## 2021-01-28 PROCEDURE — 99072 ADDL SUPL MATRL&STAF TM PHE: CPT

## 2021-01-28 NOTE — ASSESSMENT
[FreeTextEntry1] : Patient is a 75-year-old female history of left spastic hemiparesis/aphasia with complaints of left hand pain post Botox injection. Reviewed x-ray results of left hand and wrist which demonstrated significant degenerative joint disease at the first CMC joint and at the STT complex. 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 Pain improved with Tylenol. Recommend continued standing regimen of Tylenol t.i.d. and recommend increase wearing time of her resting hand splint. No NSAIDs 2/2 coumadin use.\par \par I spent a total of 20 minutes on the date of encounter evaluating treating the patient including a review of the x-ray results and discussion of treatment options.

## 2021-01-28 NOTE — PHYSICAL EXAM
[FreeTextEntry1] : General: Well developed female in no apparent distress. Patient is awake, alert, follows two-step commands. Cooperative.\par \par Left hand: no erythema, warmth or edema at all digits and wrist. (-)TTP at MCP/PIP/DIP joints and able to passively ext digits with min discomfort. (+) pain with PROM at wrist, (-)TTP\par \par Motor:\par Left upper extremity: Synergistic movement of the shoulder flexors/elbow flexors less than 3/5 motor power. No active in movement at hand or wrist\par Tone:\par Left wrist flexors MAS = 1-1+, passive extension to neutral position to 30 degrees\par Left FDS MAS= 0 with a wrist flexed, MAS = 0 with the wrist extended at digits 2 and 3. MAS = 0-1 at digits 4, MAS = 2, digit 5 MAS=1+ with the wrist extended.\par Left FDP MAS = 0 with a wrist flexed, MAS = 0-1 with a wrist extended.\par Left FPL MAS = 0 with a wrist flexed, MAS = 0 with wrist extended.\par Left MCP flexors MAS = 0.\par \par

## 2021-01-28 NOTE — HISTORY OF PRESENT ILLNESS
[FreeTextEntry1] : Patient is a 75-year-old female history of left spastic hemiparesis/aphasia who underwent a Botox injection on January 11, 2021. Patient presents today with complaints of left hand pain especially towards the ulnar side starting the day after her Botox injection.  reports severe pain with ranging digits 4 and 5. Patient is able to tolerate her resting hand splint. No fall or trauma reported. No joint swelling redness and warmth reported. Patient started Tylenol 500 mg t.i.d. 2 days ago with relief of pain. Patient obtained x-rays of her left hand and wrist.

## 2021-01-28 NOTE — REVIEW OF SYSTEMS
[Joint Pain] : joint pain [Muscle Weakness] : muscle weakness [Difficulty Walking] : difficulty walking [Negative] : Gastrointestinal [Fever] : no fever [Chills] : no chills [Skin Rash] : no skin rash [Skin Wound] : no skin wound

## 2021-03-01 RX ORDER — GABAPENTIN 100 MG/1
100 CAPSULE ORAL 3 TIMES DAILY
Qty: 90 | Refills: 1 | Status: ACTIVE | COMMUNITY
Start: 2021-03-01 | End: 1900-01-01

## 2021-03-10 ENCOUNTER — APPOINTMENT (OUTPATIENT)
Dept: MAMMOGRAPHY | Facility: IMAGING CENTER | Age: 76
End: 2021-03-10
Payer: MEDICARE

## 2021-03-10 ENCOUNTER — OUTPATIENT (OUTPATIENT)
Dept: OUTPATIENT SERVICES | Facility: HOSPITAL | Age: 76
LOS: 1 days | End: 2021-03-10
Payer: MEDICARE

## 2021-03-10 DIAGNOSIS — Z00.8 ENCOUNTER FOR OTHER GENERAL EXAMINATION: ICD-10-CM

## 2021-03-10 PROCEDURE — 77063 BREAST TOMOSYNTHESIS BI: CPT | Mod: 26

## 2021-03-10 PROCEDURE — 77063 BREAST TOMOSYNTHESIS BI: CPT

## 2021-03-10 PROCEDURE — 77067 SCR MAMMO BI INCL CAD: CPT | Mod: 26

## 2021-03-10 PROCEDURE — 77067 SCR MAMMO BI INCL CAD: CPT

## 2021-03-23 ENCOUNTER — APPOINTMENT (OUTPATIENT)
Dept: CT IMAGING | Facility: IMAGING CENTER | Age: 76
End: 2021-03-23
Payer: MEDICARE

## 2021-03-23 ENCOUNTER — OUTPATIENT (OUTPATIENT)
Dept: OUTPATIENT SERVICES | Facility: HOSPITAL | Age: 76
LOS: 1 days | End: 2021-03-23
Payer: MEDICARE

## 2021-03-23 DIAGNOSIS — Z00.8 ENCOUNTER FOR OTHER GENERAL EXAMINATION: ICD-10-CM

## 2021-03-23 PROCEDURE — 71250 CT THORAX DX C-: CPT | Mod: 26

## 2021-03-23 PROCEDURE — 71250 CT THORAX DX C-: CPT

## 2021-03-29 NOTE — PROGRESS NOTE ADULT - PROBLEM SELECTOR PLAN 5
Patient was informed of PCP response   No current wheezing appreciated/    Would continue Duoneb neb treatments  Would reduce/d/c Solumedrol.   This will like help hyperglycemia

## 2021-03-30 ENCOUNTER — APPOINTMENT (OUTPATIENT)
Dept: PHYSICAL MEDICINE AND REHAB | Facility: CLINIC | Age: 76
End: 2021-03-30
Payer: MEDICARE

## 2021-03-30 VITALS
DIASTOLIC BLOOD PRESSURE: 86 MMHG | OXYGEN SATURATION: 95 % | HEART RATE: 81 BPM | TEMPERATURE: 93.5 F | SYSTOLIC BLOOD PRESSURE: 134 MMHG

## 2021-03-30 DIAGNOSIS — M79.642 PAIN IN LEFT HAND: ICD-10-CM

## 2021-03-30 PROCEDURE — 99072 ADDL SUPL MATRL&STAF TM PHE: CPT

## 2021-03-30 PROCEDURE — 99213 OFFICE O/P EST LOW 20 MIN: CPT

## 2021-03-30 RX ORDER — DILTIAZEM HYDROCHLORIDE 180 MG/1
180 CAPSULE, EXTENDED RELEASE ORAL
Qty: 90 | Refills: 0 | Status: ACTIVE | COMMUNITY
Start: 2021-03-24

## 2021-03-30 RX ORDER — ONABOTULINUMTOXINA 100 [USP'U]/1
100 INJECTION, POWDER, LYOPHILIZED, FOR SOLUTION INTRADERMAL; INTRAMUSCULAR
Qty: 5 | Refills: 0 | Status: ACTIVE | OUTPATIENT
Start: 2021-03-30

## 2021-03-30 RX ORDER — INDAPAMIDE 1.25 MG/1
1.25 TABLET, FILM COATED ORAL
Qty: 39 | Refills: 0 | Status: ACTIVE | COMMUNITY
Start: 2021-01-19

## 2021-03-30 NOTE — ASSESSMENT
[FreeTextEntry1] : Patient is a 75-year-old female history of left spastic hemiparesis/aphasia with complaints of left hand pain post Botox injection. Reviewed x-ray results of left hand and wrist which demonstrated significant degenerative joint disease at the first CMC joint and at the STT complex. Clinical exam relatively benign and no evidence of left ulna neuropathy. 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 Tolerating resting hand splint at night. No NSAIDs 2/2 coumadin use. Will decrease Gabapentin to BID through the next Botox injection. Recommend 1 week of Tylenol BID. Will increase dose to FDS. New Botox injection protocol:\par \par Left FCR----------------------------- 50 units\par Left FCU----------------------------- 50 units\par Left FDS----------------------------- 85 units\par Left FDP----------------------------- 40 units\par Left FPL----------------------------- 15 units\par Left lumbricals ----------------------40 units (10 units x4)\par Left medial gastrocnemius-------- 70 units\par Left lateral gastrocnemius-------- 70 units\par Left soleus-------------------------- 40 units\par \par Total ----------------------------------460 units\par \par I spent a total of 20 minutes on the date of encounter evaluating treating the patient including a review of the x-ray results and discussion of treatment options.
Improved

## 2021-03-30 NOTE — HISTORY OF PRESENT ILLNESS
[FreeTextEntry1] : Patient is a 75-year-old female history of left spastic hemiparesis/aphasia who underwent a Botox injection on January 11, 2021. Patient's left hand pain has improved overall. Patient's  reports that the pain is no longer localize to digits 4 and 5 but tends to occur in all her fingers. No apparent pain at rest. Pain noted with stretching her fingers. Patient recently titrated up to gabapentin 100 mg t.i.d. Patient tolerated b.i.d. well, but has noted complaints of dizziness since increasing to t.i.d. Tylenol PRN.

## 2021-03-30 NOTE — PHYSICAL EXAM
[FreeTextEntry1] : General: Well developed female in no apparent distress. Patient is awake, alert, follows two-step commands. Cooperative.\par \par Left hand: no erythema, warmth or edema at all digits and wrist. (-)TTP at MCP/PIP/DIP joints and able to passively ext digits with min discomfort. (+) pain with PROM at wrist, (-)TTP\par \par Motor:\par Left upper extremity: Synergistic movement of the shoulder flexors/elbow flexors less than 3/5 motor power. No active in movement at hand or wrist\par Tone:\par Left wrist flexors MAS = 2-2+, passive extension to neutral position \par Left FDS MAS= 1 with a wrist flexed, MAS = 1 with the wrist extended at digits 2 and 3. MAS = 2+-3 at digits 4, MAS = 2-2+, digit 5 MAS=1+ with the wrist extended.\par Left FDP MAS = 0 with a wrist flexed, MAS = 1-1+ with a wrist extended.\par Left FPL MAS = 0 with a wrist flexed, MAS = 1+ with wrist extended.\par Left MCP flexors MAS = 0.\par \par sensory: LT/PP similar comparing the left thumb and digit 5. No difference comparing ulna and radial side of digit 4 on left. No allodynia or hyperesthesia.\par \par

## 2021-04-20 ENCOUNTER — APPOINTMENT (OUTPATIENT)
Dept: PHYSICAL MEDICINE AND REHAB | Facility: CLINIC | Age: 76
End: 2021-04-20
Payer: MEDICARE

## 2021-04-20 VITALS
HEART RATE: 82 BPM | WEIGHT: 97 LBS | RESPIRATION RATE: 14 BRPM | TEMPERATURE: 95.6 F | BODY MASS INDEX: 19.04 KG/M2 | SYSTOLIC BLOOD PRESSURE: 104 MMHG | DIASTOLIC BLOOD PRESSURE: 64 MMHG | HEIGHT: 60 IN | OXYGEN SATURATION: 96 %

## 2021-04-20 PROCEDURE — 64643 CHEMODENERV 1 EXTREM 1-4 EA: CPT

## 2021-04-20 PROCEDURE — 99072 ADDL SUPL MATRL&STAF TM PHE: CPT

## 2021-04-20 PROCEDURE — 95874 GUIDE NERV DESTR NEEDLE EMG: CPT

## 2021-04-20 PROCEDURE — 64644 CHEMODENERV 1 EXTREM 5/> MUS: CPT

## 2021-05-25 ENCOUNTER — APPOINTMENT (OUTPATIENT)
Dept: PHYSICAL MEDICINE AND REHAB | Facility: CLINIC | Age: 76
End: 2021-05-25
Payer: MEDICARE

## 2021-05-25 VITALS
BODY MASS INDEX: 19.04 KG/M2 | OXYGEN SATURATION: 93 % | WEIGHT: 97 LBS | HEART RATE: 81 BPM | DIASTOLIC BLOOD PRESSURE: 71 MMHG | HEIGHT: 60 IN | TEMPERATURE: 95.5 F | SYSTOLIC BLOOD PRESSURE: 108 MMHG

## 2021-05-25 DIAGNOSIS — M19.032 PRIMARY OSTEOARTHRITIS, LEFT WRIST: ICD-10-CM

## 2021-05-25 PROCEDURE — 99213 OFFICE O/P EST LOW 20 MIN: CPT

## 2021-05-25 PROCEDURE — 99072 ADDL SUPL MATRL&STAF TM PHE: CPT

## 2021-05-25 RX ORDER — ONABOTULINUMTOXINA 100 [USP'U]/1
100 INJECTION, POWDER, LYOPHILIZED, FOR SOLUTION INTRADERMAL; INTRAMUSCULAR
Qty: 5 | Refills: 0 | Status: ACTIVE | OUTPATIENT
Start: 2021-05-25

## 2021-05-25 RX ORDER — PREDNISONE 10 MG/1
10 TABLET ORAL
Qty: 14 | Refills: 0 | Status: ACTIVE | COMMUNITY
Start: 2021-04-28

## 2021-05-25 NOTE — PHYSICAL EXAM
[FreeTextEntry1] : General: Well developed female in no apparent distress. Patient is awake, alert, follows two-step commands. Cooperative.\par \par Left hand: no erythema, warmth or edema at all digits and wrist. (-)TTP at MCP/PIP/DIP joints and able to passively ext digits with no pain. (-)TTP at 1st CMC joint. (+) CMC grind test. \par \par Motor:\par Left upper extremity: Synergistic movement of the shoulder flexors/elbow flexors less than 3/5 motor power. No active in movement at hand or wrist\par Tone:\par Left wrist flexors MAS = 0-1, passive extension to neutral position \par Left FDS MAS= 0-1 with a wrist flexed, MAS = 1 with the wrist extended at digits 2 and 3. MAS = 1+-2 at digits 4, MAS = 1+-2, digit 5 MAS=1+ with the wrist extended.\par Left FDP MAS = 0 with a wrist flexed, MAS = 1 with a wrist extended.\par Left FPL MAS = 0 with a wrist flexed, MAS = 0-1 with wrist extended.\par Left MCP flexors MAS = 0.\par \par Left lower extremity: Able to passively dorsiflexed ankle 5-10° above neutral with the knee flexed and extended.\par Left ankle plantar flexors  MAS = 1.\par \par Functional status: Patient ambulated with left hinged AFO with good heel strike, minimal genu recurvatum with use of a left heel lift. AFO fits well.\par \par \par

## 2021-05-25 NOTE — REVIEW OF SYSTEMS
[Muscle Weakness] : muscle weakness [Difficulty Walking] : difficulty walking [Negative] : Gastrointestinal [Fever] : no fever [Chills] : no chills [Skin Rash] : no skin rash [Skin Wound] : no skin wound

## 2021-05-25 NOTE — ASSESSMENT
[FreeTextEntry1] : Patient is a 75-year-old female history of left spastic hemiparesis/aphasia with excellent tone reduction post Botox injection. Will continue the Botox injection protocol of April 20, 2021 (5 vials).  Reviewed x-ray results of left hand and wrist which demonstrated significant degenerative joint disease at the first CMC joint and at the STT complex. Overall patient's left thumb pain is well controlled on Tylenol, will hold Depo-Medrol injection to the first CMC joint. Patient to continue Tylenol, no NSAIDs secondary to Coumadin use.     \par \par I spent a total of 20 minutes on the date of encounter evaluating treating the patient including a review of the x-ray results and discussion of treatment options.

## 2021-05-25 NOTE — HISTORY OF PRESENT ILLNESS
[FreeTextEntry1] : Patient is a 75-year-old female history of left spastic hemiparesis/aphasia who underwent a Botox injection on April 20, 2021. Patient's left thumb pain has improved on tylenol. No apparent pain at rest. Pain noted with stretching her fingers and patient has been compliant with the hand splint. Patient and  report good tone reduction at the left hand/wrist. Patient able to comfortably stretch her fingers and she's tolerating her resting hand splint. Good tone reduction noted at the ankle plantarflexors and patient is tolerating her AFO. Patient ambulates with SAC/AFO with supervision. No falls reported.

## 2021-08-03 ENCOUNTER — APPOINTMENT (OUTPATIENT)
Dept: PHYSICAL MEDICINE AND REHAB | Facility: CLINIC | Age: 76
End: 2021-08-03

## 2021-09-08 ENCOUNTER — APPOINTMENT (OUTPATIENT)
Dept: PHYSICAL MEDICINE AND REHAB | Facility: CLINIC | Age: 76
End: 2021-09-08

## 2021-11-09 ENCOUNTER — APPOINTMENT (OUTPATIENT)
Dept: PHYSICAL MEDICINE AND REHAB | Facility: CLINIC | Age: 76
End: 2021-11-09
Payer: MEDICARE

## 2021-11-09 PROCEDURE — 64643 CHEMODENERV 1 EXTREM 1-4 EA: CPT

## 2021-11-09 PROCEDURE — 64644 CHEMODENERV 1 EXTREM 5/> MUS: CPT

## 2021-11-09 PROCEDURE — 95874 GUIDE NERV DESTR NEEDLE EMG: CPT

## 2022-02-20 NOTE — PROGRESS NOTE ADULT - ASSESSMENT
[address] [facility] [facility]        NAME: Mauro Wade is a 41 y o  male  : 1978    MRN: [id number]  DATE: 2022  TIME: 11:10 AM    Assessment and Plan   Generalized abdominal pain [R10 84]  1  Generalized abdominal pain     2  Urine abnormality  POCT urine dip    Urine culture   3  Urinary frequency     4  Fever, unspecified fever cause       Urine dip negative for signs of infection, antibiotics not initiated at this time  Awaiting final culture results  Patient declining COVID or flu testing at this time    Patient Instructions       Follow up with PCP in 3-5 days  Proceed to  ER if symptoms worsen  Chief Complaint     Chief Complaint   Patient presents with    Abdominal Pain     B/L UPPER X YESTERDAY, DENIES N/V/D    Fever     X FRIDAY     Possible UTI     FREQUENT URINATION X YESTERDAY          History of Present Illness       Patient presenting for evaluation of abdominal pain, urinary frequency and low-grade fever  Patient states that he has been having abdominal pain over the past day in his upper abdomen and flank  He also complains of frequency of urination, but describes urgency, burning or blood in his urine  He also states that he had a low-grade fever, T-max being 100 0  He states that he took Tylenol over the past day, and it decreased his temperature  Patient states that he was started on hydrochlorothiazide 6 weeks ago  He he states that this increased his urinary frequency, but over the past day has been more frequent  He states that he had routine blood work, and everything looks normal   He denies any nausea, vomiting, diarrhea, chest pain, shortness of breath, congestion or cough  Patient declines COVID or flu testing at this time  Abdominal Pain  This is a new problem  The current episode started yesterday  The onset quality is gradual  The problem occurs constantly  The problem has been waxing and waning  The pain is located in the LUQ and RUQ   The pain is at a severity of 7/10  The quality of the pain is aching and burning  Associated symptoms include a fever and frequency  Pertinent negatives include no arthralgias, dysuria, headaches, hematuria or vomiting  Nothing aggravates the pain  The pain is relieved by urination  Prior diagnostic workup includes ultrasound  Review of Systems   Review of Systems   Constitutional: Positive for fever  Negative for chills  HENT: Negative for ear pain and sore throat  Eyes: Negative for pain and visual disturbance  Respiratory: Negative for cough and shortness of breath  Cardiovascular: Negative for chest pain and palpitations  Gastrointestinal: Positive for abdominal pain  Negative for vomiting  Genitourinary: Positive for frequency  Negative for dysuria and hematuria  Musculoskeletal: Negative for arthralgias and back pain  Skin: Negative for color change and rash  Neurological: Negative for syncope and headaches  All other systems reviewed and are negative          Current Medications       Current Outpatient Medications:     cetirizine (ZyrTEC) 10 mg tablet, Take 10 mg by mouth daily, Disp: , Rfl:     hydrochlorothiazide (HYDRODIURIL) 12 5 mg tablet, Take 12 5 mg by mouth daily, Disp: , Rfl:     multivitamin (THERAGRAN) TABS, Take 1 tablet by mouth daily, Disp: , Rfl:     acetaminophen (TYLENOL) 650 mg CR tablet, Take 1 tablet (650 mg total) by mouth every 8 (eight) hours as needed for mild pain (Patient not taking: Reported on 1/4/2022 ), Disp: 30 tablet, Rfl: 0    naproxen sodium (ALEVE) 220 MG tablet, Take 1 tablet (220 mg total) by mouth 2 (two) times a day with meals (Patient not taking: Reported on 1/4/2022 ), Disp: 20 tablet, Rfl: 0    Current Allergies     Allergies as of 02/20/2022 - Reviewed 02/20/2022   Allergen Reaction Noted    Penicillins  02/08/2014            The following portions of the patient's history were reviewed and updated as appropriate: allergies, current 74 yo woman with h/o COPD,DVT on AC  s/p fall/passing out  ? seizure  s/p respiratory failure -mechanical ventilation  Low grade fever but resolved  Hypoxia improving   CXR clear   Cultures as above  ? Aspiration  ? COPD exacerbation  resolving   No s/s any other foci-workup for CNS infection and LP negative  Recommend: medications, past family history, past medical history, past social history, past surgical history and problem list      Past Medical History:   Diagnosis Date    Asthma        Past Surgical History:   Procedure Laterality Date    AK WRIST Edgar Webb LIG Right 10/12/2021    Procedure: Right endoscopic carpal tunnel release;  Surgeon: Jacque Kaye MD;  Location: [facility];  Service: Orthopedics    AK WRIST Edgar Webb LIG Left 10/26/2021    Procedure: RELEASE CARPAL TUNNEL ENDOSCOPIC;  Surgeon: Jacque Kaye MD;  Location: [facility];  Service: Orthopedics       History reviewed  No pertinent family history  Medications have been verified  Objective   /85   Pulse 101   Temp 97 9 °F (36 6 °C)   Resp 18   SpO2 98%        Physical Exam     Physical Exam  Vitals and nursing note reviewed  Constitutional:       General: He is not in acute distress  Appearance: Normal appearance  He is not ill-appearing  HENT:      Head: Normocephalic and atraumatic  Right Ear: Tympanic membrane normal       Left Ear: Tympanic membrane normal       Nose: Nose normal  No congestion or rhinorrhea  Mouth/Throat:      Mouth: Mucous membranes are moist       Pharynx: Oropharynx is clear  No oropharyngeal exudate or posterior oropharyngeal erythema  Eyes:      Extraocular Movements: Extraocular movements intact  Conjunctiva/sclera: Conjunctivae normal       Pupils: Pupils are equal, round, and reactive to light  Cardiovascular:      Rate and Rhythm: Normal rate and regular rhythm  Pulses: Normal pulses  Heart sounds: Normal heart sounds  No murmur heard  No friction rub  No gallop  Pulmonary:      Effort: Pulmonary effort is normal  No respiratory distress  Breath sounds: Normal breath sounds  No stridor  No wheezing, rhonchi or rales  Abdominal:      General: Bowel sounds are normal       Palpations: Abdomen is soft        Tenderness: There is no abdominal tenderness  There is no right CVA tenderness, left CVA tenderness, guarding or rebound  Hernia: No hernia is present  Musculoskeletal:         General: Normal range of motion  Cervical back: Normal range of motion and neck supple  Skin:     General: Skin is warm and dry  Capillary Refill: Capillary refill takes less than 2 seconds  Neurological:      General: No focal deficit present  Mental Status: He is alert and oriented to person, place, and time     Psychiatric:         Mood and Affect: Mood normal          Behavior: Behavior normal

## 2022-04-08 NOTE — DIETITIAN INITIAL EVALUATION ADULT. - WEIGHT IN LBS
94.3 Bilobed Transposition Flap Text: The defect edges were debeveled with a #15 scalpel blade.  Given the location of the defect and the proximity to free margins a bilobed transposition flap was deemed most appropriate.  Using a sterile surgical marker, an appropriate bilobe flap drawn around the defect.    The area thus outlined was incised deep to adipose tissue with a #15 scalpel blade.  The skin margins were undermined to an appropriate distance in all directions utilizing iris scissors.

## 2022-05-24 ENCOUNTER — APPOINTMENT (OUTPATIENT)
Dept: PHYSICAL MEDICINE AND REHAB | Facility: CLINIC | Age: 77
End: 2022-05-24
Payer: MEDICARE

## 2022-05-24 VITALS — OXYGEN SATURATION: 90 % | DIASTOLIC BLOOD PRESSURE: 75 MMHG | SYSTOLIC BLOOD PRESSURE: 154 MMHG | HEART RATE: 82 BPM

## 2022-05-24 PROCEDURE — 99213 OFFICE O/P EST LOW 20 MIN: CPT

## 2022-05-24 RX ORDER — ONABOTULINUMTOXINA 100 [USP'U]/1
100 INJECTION, POWDER, LYOPHILIZED, FOR SOLUTION INTRADERMAL; INTRAMUSCULAR
Qty: 2 | Refills: 0 | Status: ACTIVE | OUTPATIENT
Start: 2022-05-24

## 2022-05-24 NOTE — ASSESSMENT
[FreeTextEntry1] : Patient is a 76-year-old female history of left spastic hemiparesis/aphasia with excellent tone reduction post Botox injection.  Patient is 6 months post Botox injection and the only muscle group with significant tone is the FDS, possible FDP.  Patient tolerating hand roll and stretching and will hold a Botox injection for now.  Will drop the wrist flexors and FPL in the future and consider dropping the gastrocsoleus as well.  New Botox injection protocol:\par \par Left FDS------------------------- 85 units\par Left FDP------------------------- 40 units\par Left lumbricals------------------ 40 units\par \par Total------------------------------ 165 units, would consider adding the gastric soleus muscle at time of injection     \par \par I spent a total of 20 minutes on the date of encounter evaluating treating the patient including a review of the x-ray results and discussion of treatment options.

## 2022-05-24 NOTE — PHYSICAL EXAM
[FreeTextEntry1] : General: Well developed female in no apparent distress. Patient is awake, alert, follows two-step commands. Cooperative.\par \par Left hand: no erythema, warmth or edema at all digits and wrist. (-)TTP at MCP/PIP/DIP joints and able to passively ext digits with no pain. (-)TTP at 1st CMC joint. \par \par Motor:\par Left upper extremity: Synergistic movement of the shoulder flexors/elbow flexors less than 3/5 motor power. No active in movement at hand or wrist\par Tone:\par Left wrist flexors MAS = 1, passive extension to 70-80 deg. Maintains wrist in ~10 extension. \par Left FDS MAS= 2 with a wrist flexed, MAS = 2+ with the wrist extended at digits 3/4/5, MAS = 2+-3 at digits 2, with the wrist extended.\par Left FDP MAS = 1 with a wrist flexed, MAS = 1+-2 with a wrist extended.\par Left FPL MAS = 0 with a wrist flexed, MAS = 2 with wrist extended.\par Left MCP flexors MAS = 2.\par \par Left lower extremity: Able to passively dorsiflexed ankle 5-10° above neutral with the knee flexed and extended.\par Left ankle plantar flexors  MAS = 2.\par \par Functional status: Patient ambulated with left hinged AFO with shallow heel strike, no genu recurvatum with use of a left heel lift. AFO fits well. (+)uncompensated gluteus medius\par \par \par

## 2022-05-24 NOTE — HISTORY OF PRESENT ILLNESS
[FreeTextEntry1] : Patient is a 76-year-old female history of left spastic hemiparesis/aphasia who underwent a Botox injection on Nov. 9, 2021. Patient's left thumb pain has improved and well controlled by Voltaren cream PRN. No apparent pain at rest.   reports excellent tone reduction after last Botox injection.  Patient's left wrist is doing very well with reduced tone.  Patient has been using a hand roll and has some increased tone at the fingers but she is tolerating stretching well.  No hygiene issues.  Patient tolerating hand roll well, no longer uses hand splint. Good tone reduction noted at the ankle plantarflexors and patient is tolerating her AFO. Patient ambulates with SAC/AFO with supervision. No falls reported.

## 2022-07-27 ENCOUNTER — APPOINTMENT (OUTPATIENT)
Dept: PHYSICAL MEDICINE AND REHAB | Facility: CLINIC | Age: 77
End: 2022-07-27

## 2022-07-27 VITALS — OXYGEN SATURATION: 96 % | SYSTOLIC BLOOD PRESSURE: 121 MMHG | DIASTOLIC BLOOD PRESSURE: 68 MMHG | HEART RATE: 88 BPM

## 2022-07-27 PROCEDURE — 95874 GUIDE NERV DESTR NEEDLE EMG: CPT

## 2022-07-27 PROCEDURE — 64642 CHEMODENERV 1 EXTREMITY 1-4: CPT

## 2022-09-07 ENCOUNTER — APPOINTMENT (OUTPATIENT)
Dept: PHYSICAL MEDICINE AND REHAB | Facility: CLINIC | Age: 77
End: 2022-09-07

## 2022-09-07 DIAGNOSIS — R26.9 UNSPECIFIED ABNORMALITIES OF GAIT AND MOBILITY: ICD-10-CM

## 2022-09-07 PROCEDURE — 99213 OFFICE O/P EST LOW 20 MIN: CPT

## 2022-09-07 RX ORDER — ONABOTULINUMTOXINA 100 [USP'U]/1
100 INJECTION, POWDER, LYOPHILIZED, FOR SOLUTION INTRADERMAL; INTRAMUSCULAR
Qty: 2 | Refills: 0 | Status: ACTIVE | OUTPATIENT
Start: 2022-09-07

## 2022-09-07 NOTE — PHYSICAL EXAM
[FreeTextEntry1] : General: Well developed female in no apparent distress. Patient is awake, alert, follows two-step commands. Cooperative.\par \par Motor:\par Left upper extremity: Synergistic movement of the shoulder flexors/elbow flexors less than 3/5 motor power. No active in movement at hand or wrist\par Tone:\par Left wrist flexors MAS = 1, passive extension to 70-80 deg. Maintains wrist in ~10 extension. \par Left FDS MAS= 1+-2 at digit 2 with a wrist flexed, MAS =0 at digits 3/4/5 with the wrist flexed. MAS= 2 with the wrist extended at digits 3/4/5, MAS = 2+ at digits 2, with the wrist extended.\par Left FDP MAS = 0 with a wrist flexed, MAS = 1 with a wrist extended.\par Left FPL MAS = 0 with a wrist flexed, MAS = 2 with wrist extended.\par Left MCP flexors MAS = 0-1.\par \par Left lower extremity: Able to passively dorsiflexed ankle 5-10° above neutral with the knee flexed and extended.\par Left ankle plantar flexors  MAS = 2.\par \par Functional status: Patient ambulated with left hinged AFO with shallow heel strike, no genu recurvatum with use of a left heel lift-3/8". (+)uncompensated gluteus medius.  Left hinged AFO tight at lower leg.\par \par \par

## 2022-09-07 NOTE — ASSESSMENT
[FreeTextEntry1] : Patient is a 77-year-old female history of left spastic hemiparesis/aphasia with excellent tone reduction post Botox injection.  Patient with good tone reduction at the lumbricals and FDP.  Some increased tone noted especially at digit 2.  In addition the patient's AFO is greater than 5 years old and tight and worn and requires replacement.  Patient requires a custom molded AFO to prevent foot drop and for safe ambulation.  Patient will require the brace on a permanent basis.  Prescription provided for a left custom molded AFO with [place] ankle joints, varus control, adjustable posterior stop, instep strap, full footplate, and flexible toe. \par \par  New Botox injection protocol:\par \par Left FDS------------------------- 85 units (will consider more radial approach with injection versus increasing to 90 units at time of injection)\par Left FDP------------------------- 40 units\par Left lumbricals------------------ 40 units\par \par Total------------------------------ 165 units\par \par I spent a total of 20 minutes on the date of encounter evaluating treating the patient including a review of the x-ray results and discussion of treatment options.

## 2022-09-07 NOTE — HISTORY OF PRESENT ILLNESS
[FreeTextEntry1] : Patient is a 77-year-old female history of left spastic hemiparesis/aphasia who underwent a Botox injection on July 27, 2022. Patient's spouse reports tone reduction at the fingers however not as loose as previous injections.  Patient has been using a hand roll and has some increased tone at the fingers but she is tolerating stretching well.  No hygiene issues.  Patient tolerating hand roll well, no longer uses hand splint.  Gastrocnemius and soleus were discontinued last injection and patient and spouse feel she continues to have good tone reduction noted at the ankle plantarflexors and patient is tolerating her AFO. Patient ambulates with SAC/AFO with supervision. No falls reported.  Spouse reports that the patient has gained approximately 10 pounds over the last year and the brace is somewhat tight at the lower leg.

## 2022-12-26 ENCOUNTER — APPOINTMENT (OUTPATIENT)
Dept: CT IMAGING | Facility: IMAGING CENTER | Age: 77
End: 2022-12-26
Payer: MEDICARE

## 2022-12-26 ENCOUNTER — OUTPATIENT (OUTPATIENT)
Dept: OUTPATIENT SERVICES | Facility: HOSPITAL | Age: 77
LOS: 1 days | End: 2022-12-26
Payer: MEDICARE

## 2022-12-26 DIAGNOSIS — R51.9 HEADACHE, UNSPECIFIED: ICD-10-CM

## 2022-12-26 DIAGNOSIS — M54.2 CERVICALGIA: ICD-10-CM

## 2022-12-26 PROCEDURE — 70450 CT HEAD/BRAIN W/O DYE: CPT | Mod: 26

## 2022-12-26 PROCEDURE — 72125 CT NECK SPINE W/O DYE: CPT | Mod: 26

## 2022-12-26 PROCEDURE — 72125 CT NECK SPINE W/O DYE: CPT

## 2022-12-26 PROCEDURE — 70450 CT HEAD/BRAIN W/O DYE: CPT

## 2023-05-05 ENCOUNTER — APPOINTMENT (OUTPATIENT)
Dept: RADIOLOGY | Facility: IMAGING CENTER | Age: 78
End: 2023-05-05
Payer: MEDICARE

## 2023-05-05 ENCOUNTER — OUTPATIENT (OUTPATIENT)
Dept: OUTPATIENT SERVICES | Facility: HOSPITAL | Age: 78
LOS: 1 days | End: 2023-05-05
Payer: MEDICARE

## 2023-05-05 DIAGNOSIS — Z00.8 ENCOUNTER FOR OTHER GENERAL EXAMINATION: ICD-10-CM

## 2023-05-05 PROCEDURE — 71046 X-RAY EXAM CHEST 2 VIEWS: CPT

## 2023-05-05 PROCEDURE — 71046 X-RAY EXAM CHEST 2 VIEWS: CPT | Mod: 26

## 2023-07-17 NOTE — SWALLOW BEDSIDE ASSESSMENT ADULT - SLP GENERAL OBSERVATIONS
[x] ? ROM                      [x] ? Strength  [x] ? Flexibility:              [x] ? Function: LEFS 34% functional   [] ? Balance  [] Edema  [] Postural Deviations  [x] Gait Deviations  [] Other     STG: (to be met in 10 treatments)  ? Pain: 2/10 L ankle pain with ambulation. Ongoing, 5/10 along outside of foot  ? ROM:L ankle DF to 20 degrees to improve gait pattern. Not MET 2 degrees of DF  ? Strength: 5/5 L ankle all planes of motion to improve joint stability in standing. MET 5/5, except IV 4/5 with pain  ? Function:Patient is ambulating with normal gait pattern. 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 Return to work full duty. Patient goals: Walk as normal as possible. Rehab Potential:  [x] Good  [] Fair  [] Poor              Suggested Professional Referral:  [x] No  [] Yes:  Barriers to Goal Achievement[de-identified]  [x] No  [] Yes:  Domestic Concerns:  [x] No  [] Yes:    Pt. Education:  [x] Yes  [] No  [x] Reviewed Prior HEP/Ed  Method of Education: [x] Verbal  [x] Demo  [] Written  Comprehension of Education:  [x] Verbalizes understanding. [x] Demonstrates understanding. [] Needs review. [x] Demonstrates/verbalizes HEP/Ed previously given. Access Code: [email or web address]  URL: [email or web address]  Date: 07/07/2023  Prepared by: [id number]  - 1 x daily - 7 x weekly - 3 sets - 10 reps  - Heel raises  - 1 x daily - 7 x weekly - 3 sets - 10 reps  - Toe raises  - 1 x daily - 7 x weekly - 3 sets - 10 reps  - Standing March  - 1 x daily - 7 x weekly - 3 sets - 10 reps  - Hip abd  - 1 x daily - 7 x weekly - 3 sets - 10 reps  - Standing Hip Extension  - 1 x daily - 7 x weekly - 3 sets - 10 reps  - Hip flexion  - 1 x daily - 7 x weekly - 3 sets - 10 reps  - Pt seen - with acute change in MS< does not recognize family members, not oriented, restless and continually moving in bed, perseverative, confused, attempting to remove O2. D/w Daughter (who stated that she is concerned that pt is not "acting right") that it would be unsafe to feed her at present. Daughter agreed and provided hx - stated that pt still getting home based speech tx and that pt still with s/s of non-fluent aphasia. When questioned re: swallow status, stated that Pt was on a regular diet, no swallow assessment/tx since being seen at [facility] a few years prior. Reported chronic cough associated with COPD, but that Pt sometimes does cough "when she drinks too quickly"

## 2023-07-28 NOTE — CONSULT NOTE ADULT - NSPROBSELRECBLANK_5_GEN
Caller: Bill Umana    Relationship to patient: Self    Best call back number: [phone]    Chief complaint: PT WAS IN A FIB ON TUESDAY - PT IS NOW FEELS LIKE HOS HEART IS SKIPPING SOME BEATS. WHEN HE IS SITTING DOWN ITS IN THE LOW 50S AND WHEN HE STANDS UP ITS UP IN THE 100S. WHEN HE IS WALKING ITS IN -130S.     Type of visit: FOLLOW UP     Requested date: ASAP -  AUG 1ST IF POSSIBLE      Additional notes:P[T WILL BE IN TOWN ON TUESDAY          
Can you call him with a f/u appointment with Dr. Downs? You can also offer him an appointment with Will.    Thanks!  
Encounter originally under Dr. Sousa. Changed encounter to Dr. Downs, who patient sees for a-fib.   
LVM FOR PT TO CALL BACK TO [facility]  
DISPLAY PLAN FREE TEXT

## 2023-09-05 ENCOUNTER — APPOINTMENT (OUTPATIENT)
Dept: PHYSICAL MEDICINE AND REHAB | Facility: CLINIC | Age: 78
End: 2023-09-05
Payer: MEDICARE

## 2023-09-05 PROCEDURE — 99213 OFFICE O/P EST LOW 20 MIN: CPT

## 2023-09-05 RX ORDER — POTASSIUM CHLORIDE 750 MG/1
10 TABLET, FILM COATED, EXTENDED RELEASE ORAL
Qty: 12 | Refills: 0 | Status: ACTIVE | COMMUNITY
Start: 2023-06-01

## 2023-09-05 RX ORDER — ESCITALOPRAM OXALATE 20 MG/1
20 TABLET ORAL
Qty: 30 | Refills: 0 | Status: ACTIVE | COMMUNITY
Start: 2023-08-21

## 2023-09-05 RX ORDER — SPIRONOLACTONE 25 MG/1
25 TABLET ORAL
Qty: 90 | Refills: 0 | Status: ACTIVE | COMMUNITY
Start: 2023-07-11

## 2023-09-05 RX ORDER — ROSUVASTATIN CALCIUM 20 MG/1
20 TABLET, FILM COATED ORAL
Qty: 90 | Refills: 0 | Status: ACTIVE | COMMUNITY
Start: 2023-03-28

## 2023-09-05 RX ORDER — ONABOTULINUMTOXINA 100 [USP'U]/1
100 INJECTION, POWDER, LYOPHILIZED, FOR SOLUTION INTRADERMAL; INTRAMUSCULAR
Qty: 2 | Refills: 0 | Status: ACTIVE | OUTPATIENT
Start: 2023-09-05

## 2023-09-05 RX ORDER — POTASSIUM CHLORIDE 1.5 G/1
20 POWDER, FOR SOLUTION ORAL
Qty: 30 | Refills: 0 | Status: ACTIVE | COMMUNITY
Start: 2023-05-25

## 2023-09-05 RX ORDER — CLINDAMYCIN HYDROCHLORIDE 300 MG/1
300 CAPSULE ORAL
Qty: 14 | Refills: 0 | Status: COMPLETED | COMMUNITY
Start: 2023-05-08

## 2023-09-05 NOTE — HISTORY OF PRESENT ILLNESS
[FreeTextEntry1] : Patient is a 78-year-old female history of left spastic hemiparesis/aphasia who underwent a Botox injection on July 27, 2022. Patient's spouse reports tone reduction at the fingers however not as loose as previous injections.  The Botox injection has worn off and has increased tone at the hand causing increased stiffness at the fingers and pain with stretching. No hygiene issues.  Patient tolerating hand roll well, no longer uses hand splint.  Gastrocnemius and soleus were discontinued last injection and patient and spouse feel she continues to have good tone reduction noted at the ankle plantarflexors and patient is tolerating her AFO. Patient ambulates with SAC/AFO with supervision. No falls reported.  Patient received her new AFO in September, 2023 and fits well, no skin issues.

## 2023-09-05 NOTE — ASSESSMENT
[FreeTextEntry1] : Patient is a 78-year-old female history of left spastic hemiparesis/aphasia with increased tone noted at the FDS/FDP since last Botox injection in July, 2022.  Will mildly increase the dose to the FDS.  Patient with right heel lift 1/2 inch and discussed benefits of an increased height to improve clearance of her left lower extremity.  Prescription provided for a right outer sole lift- 5/8" and will consider further heel lift inside the shoe on the right in the future. New Botox injection protocol:  Left FDS------------------------- 90 units Left FDP------------------------- 40 units Left lumbricals------------------ 40 units  Total------------------------------ 170 units  I spent a total of 20 minutes on the date of encounter evaluating treating the patient including a review of the x-ray results and discussion of treatment options.

## 2023-09-05 NOTE — PHYSICAL EXAM
[FreeTextEntry1] : General: Well developed female in no apparent distress. Patient is awake, alert, follows two-step commands. Cooperative.  Motor: Left upper extremity: Synergistic movement of the shoulder flexors/elbow flexors less than 3/5 motor power. No active in movement at hand or wrist Tone: Left wrist flexors MAS = 1, passive extension to 70-80 deg. Maintains wrist in ~10 extension.  Left FDS MAS= 1+ with wrist flexed, MAS= 2+  with the wrist extended. Left FDP MAS = 1 with a wrist flexed, MAS = 2 with a wrist extended. Left FPL MAS = 0 with a wrist flexed, MAS = 2 with wrist extended. Left MCP flexors MAS = 2 digits 2/5, MAS = 2+-3 digits 3/4.  Left lower extremity: Able to passively dorsiflexed ankle 5-10 above neutral with the knee flexed and extended. Left ankle plantar flexors  MAS = 2.  Functional status: Patient ambulated with left hinged AFO with shallow heel strike, mild genu recurvatum with use of a left heel lift-3/8", right 1/2 inch heel lift.  Episodic foot catch on the left. (+) left uncompensated gluteus medius.

## 2023-10-17 ENCOUNTER — APPOINTMENT (OUTPATIENT)
Dept: PHYSICAL MEDICINE AND REHAB | Facility: CLINIC | Age: 78
End: 2023-10-17
Payer: MEDICARE

## 2023-10-17 DIAGNOSIS — R13.10 DYSPHAGIA, UNSPECIFIED: ICD-10-CM

## 2023-10-17 PROCEDURE — 95874 GUIDE NERV DESTR NEEDLE EMG: CPT

## 2023-10-17 PROCEDURE — 64642 CHEMODENERV 1 EXTREMITY 1-4: CPT

## 2023-11-06 NOTE — DISCHARGE NOTE ADULT - MEDICATION SUMMARY - MEDICATIONS TO TAKE
normal... I will START or STAY ON the medications listed below when I get home from the hospital:    aspirin 81 mg oral tablet, chewable  -- 1 tab(s) by mouth once a day  -- Indication: For CVA (cerebral vascular accident)    losartan 25 mg oral tablet  -- 1 tab(s) by mouth once a day  -- Indication: For HTN (hypertension)    tamsulosin 0.4 mg oral capsule  -- 1 cap(s) by mouth once a day (at bedtime)  -- Indication: For Urinary retention    warfarin 1 mg oral tablet  -- 1 tab by mouth once a day (at bedtime)  Check INR in am     -- Indication: For Chronic atrial fibrillation    levETIRAcetam 500 mg oral tablet  -- 1 tab(s) by mouth 2 times a day  -- Indication: For Seizure    atorvastatin 10 mg oral tablet  -- 1 tab(s) by mouth once a day  -- Indication: For CVA (cerebral vascular accident)    alendronate 70 mg oral tablet  -- 1 tab(s) by mouth once a week  -- Indication: For Osteoporosis    ProAir HFA 90 mcg/inh inhalation aerosol  -- 2 puff(s) inhaled 4 times a day, As Needed - for shortness of breath and/or wheezing  -- Indication: For Chronic obstructive pulmonary disease, unspecified COPD type    nystatin 100,000 units/g topical powder  -- 1 application on skin every 12 hours  -- Indication: For Incontinence dermatitis    montelukast 10 mg oral tablet  -- 1 tab(s) by mouth once a day  -- Indication: For Chronic obstructive pulmonary disease, unspecified COPD type    buPROPion 150 mg/24 hours (XL) oral tablet, extended release  -- 1 tab(s) by mouth every 24 hours  -- Indication: For Depression I will START or STAY ON the medications listed below when I get home from the hospital:    aspirin 81 mg oral tablet, chewable  -- 1 tab(s) by mouth once a day  -- Indication: For CVA (cerebral vascular accident)    tamsulosin 0.4 mg oral capsule  -- 1 cap(s) by mouth once a day (at bedtime)  -- Indication: For Urinary retention    dilTIAZem 30 mg oral tablet  -- 1 tab(s) by mouth every 6 hours  -- Indication: For Cardiac    Coumadin 2.5 mg oral tablet  -- 1 tab(s) by mouth once a day (at bedtime)  follow up INR to dose coumadin  -- Indication: For Anticoagulant    levETIRAcetam 500 mg oral tablet  -- 1 tab(s) by mouth 2 times a day  -- Indication: For Seizure    atorvastatin 10 mg oral tablet  -- 1 tab(s) by mouth once a day  -- Indication: For CVA (cerebral vascular accident)    alendronate 70 mg oral tablet  -- 1 tab(s) by mouth once a week  -- Indication: For Osteoporosis    ProAir HFA 90 mcg/inh inhalation aerosol  -- 2 puff(s) inhaled 4 times a day, As Needed - for shortness of breath and/or wheezing  -- Indication: For Chronic obstructive pulmonary disease, unspecified COPD type    nystatin 100,000 units/g topical powder  -- 1 application on skin every 12 hours  -- Indication: For Incontinence dermatitis    montelukast 10 mg oral tablet  -- 1 tab(s) by mouth once a day  -- Indication: For Chronic obstructive pulmonary disease, unspecified COPD type    buPROPion 150 mg/24 hours (XL) oral tablet, extended release  -- 1 tab(s) by mouth every 24 hours  -- Indication: For Depression I will START or STAY ON the medications listed below when I get home from the hospital:    aspirin 81 mg oral tablet, chewable  -- 1 tab(s) by mouth once a day  -- Indication: For CVA (cerebral vascular accident)    tamsulosin 0.4 mg oral capsule  -- 1 cap(s) by mouth once a day (at bedtime)  -- Indication: For Urinary retention    Cardizem  mg/24 hours oral capsule, extended release  -- 1 cap(s) by mouth once a day  -- Indication: For Cardiac    warfarin 1 mg oral tablet  -- 1 tab(s) by mouth once a day (at bedtime)  -- Indication: For Anticoagulant    levETIRAcetam 500 mg oral tablet  -- 1 tab(s) by mouth 2 times a day  -- Indication: For Seizure    atorvastatin 10 mg oral tablet  -- 1 tab(s) by mouth once a day  -- Indication: For CVA (cerebral vascular accident)    alendronate 70 mg oral tablet  -- 1 tab(s) by mouth once a week  -- Indication: For Osteoporosis    ProAir HFA 90 mcg/inh inhalation aerosol  -- 2 puff(s) inhaled 4 times a day, As Needed - for shortness of breath and/or wheezing  -- Indication: For Chronic obstructive pulmonary disease, unspecified COPD type    nystatin 100,000 units/g topical powder  -- 1 application on skin every 12 hours  -- Indication: For Incontinence dermatitis    montelukast 10 mg oral tablet  -- 1 tab(s) by mouth once a day  -- Indication: For Chronic obstructive pulmonary disease, unspecified COPD type    buPROPion 150 mg/24 hours (XL) oral tablet, extended release  -- 1 tab(s) by mouth every 24 hours  -- Indication: For Depression

## 2023-11-21 ENCOUNTER — APPOINTMENT (OUTPATIENT)
Dept: PHYSICAL MEDICINE AND REHAB | Facility: CLINIC | Age: 78
End: 2023-11-21
Payer: MEDICARE

## 2023-11-21 DIAGNOSIS — G81.14 SPASTIC HEMIPLEGIA AFFECTING LEFT NONDOMINANT SIDE: ICD-10-CM

## 2023-11-21 DIAGNOSIS — M21.862 OTHER SPECIFIED ACQUIRED DEFORMITIES OF LEFT LOWER LEG: ICD-10-CM

## 2023-11-21 DIAGNOSIS — R26.1 PARALYTIC GAIT: ICD-10-CM

## 2023-11-21 PROCEDURE — 99213 OFFICE O/P EST LOW 20 MIN: CPT

## 2023-11-21 RX ORDER — ONABOTULINUMTOXINA 100 [USP'U]/1
100 INJECTION, POWDER, LYOPHILIZED, FOR SOLUTION INTRADERMAL; INTRAMUSCULAR
Qty: 2 | Refills: 0 | Status: ACTIVE | OUTPATIENT
Start: 2023-11-21

## 2024-04-02 ENCOUNTER — NON-APPOINTMENT (OUTPATIENT)
Age: 79
End: 2024-04-02

## 2024-04-02 ENCOUNTER — APPOINTMENT (OUTPATIENT)
Dept: THORACIC SURGERY | Facility: CLINIC | Age: 79
End: 2024-04-02
Payer: MEDICARE

## 2024-04-02 VITALS — WEIGHT: 110 LBS | BODY MASS INDEX: 21.6 KG/M2 | HEIGHT: 60 IN

## 2024-04-02 DIAGNOSIS — Z87.891 PERSONAL HISTORY OF NICOTINE DEPENDENCE: ICD-10-CM

## 2024-04-02 PROCEDURE — G0296 VISIT TO DETERM LDCT ELIG: CPT

## 2024-04-09 NOTE — PLAN
[FreeTextEntry1] : Her LDCT is scheduled for 4/25/24 at the [facility] location.

## 2024-04-09 NOTE — HISTORY OF PRESENT ILLNESS
[TextBox_13] : She is a former smoker with a 51 pack year smoking history (1PPD x 51 years).  She quit 9 years ago.  She denies family h/o lung cancer.  She is referred by Dr. Omari Donahue.  This is a virtual visit, done via telephone with patient's  present as patient has had two strokes. [YearQuit] : 2015 [[facility]] : [facility]

## 2024-04-25 ENCOUNTER — APPOINTMENT (OUTPATIENT)
Dept: CT IMAGING | Facility: IMAGING CENTER | Age: 79
End: 2024-04-25
Payer: MEDICARE

## 2024-04-25 ENCOUNTER — OUTPATIENT (OUTPATIENT)
Dept: OUTPATIENT SERVICES | Facility: HOSPITAL | Age: 79
LOS: 1 days | End: 2024-04-25
Payer: MEDICARE

## 2024-04-25 DIAGNOSIS — Z13.9 ENCOUNTER FOR SCREENING, UNSPECIFIED: ICD-10-CM

## 2024-04-25 DIAGNOSIS — Z00.8 ENCOUNTER FOR OTHER GENERAL EXAMINATION: ICD-10-CM

## 2024-04-25 PROCEDURE — 71271 CT THORAX LUNG CANCER SCR C-: CPT

## 2024-04-25 PROCEDURE — 71271 CT THORAX LUNG CANCER SCR C-: CPT | Mod: 26

## 2024-05-13 ENCOUNTER — NON-APPOINTMENT (OUTPATIENT)
Age: 79
End: 2024-05-13

## 2024-07-10 ENCOUNTER — APPOINTMENT (OUTPATIENT)
Dept: PHYSICAL MEDICINE AND REHAB | Facility: CLINIC | Age: 79
End: 2024-07-10
Payer: MEDICARE

## 2024-07-10 VITALS
DIASTOLIC BLOOD PRESSURE: 70 MMHG | SYSTOLIC BLOOD PRESSURE: 119 MMHG | OXYGEN SATURATION: 94 % | RESPIRATION RATE: 14 BRPM | HEART RATE: 72 BPM | TEMPERATURE: 97.6 F

## 2024-07-10 DIAGNOSIS — G81.14 SPASTIC HEMIPLEGIA AFFECTING LEFT NONDOMINANT SIDE: ICD-10-CM

## 2024-07-10 DIAGNOSIS — M21.862 OTHER SPECIFIED ACQUIRED DEFORMITIES OF LEFT LOWER LEG: ICD-10-CM

## 2024-07-10 DIAGNOSIS — R26.1 PARALYTIC GAIT: ICD-10-CM

## 2024-07-10 PROCEDURE — 99213 OFFICE O/P EST LOW 20 MIN: CPT

## 2024-07-10 RX ORDER — ONABOTULINUMTOXINA 100 [USP'U]/1
100 INJECTION, POWDER, LYOPHILIZED, FOR SOLUTION INTRADERMAL; INTRAMUSCULAR
Qty: 2 | Refills: 0 | Status: ACTIVE | OUTPATIENT
Start: 2024-07-10

## 2024-07-30 ENCOUNTER — APPOINTMENT (OUTPATIENT)
Dept: OTOLARYNGOLOGY | Facility: CLINIC | Age: 79
End: 2024-07-30
Payer: MEDICARE

## 2024-07-30 VITALS
BODY MASS INDEX: 21.6 KG/M2 | OXYGEN SATURATION: 94 % | HEIGHT: 60 IN | HEART RATE: 76 BPM | WEIGHT: 110 LBS | SYSTOLIC BLOOD PRESSURE: 120 MMHG | DIASTOLIC BLOOD PRESSURE: 68 MMHG

## 2024-07-30 DIAGNOSIS — R05.3 CHRONIC COUGH: ICD-10-CM

## 2024-07-30 DIAGNOSIS — R13.10 DYSPHAGIA, UNSPECIFIED: ICD-10-CM

## 2024-07-30 PROCEDURE — 31575 DIAGNOSTIC LARYNGOSCOPY: CPT

## 2024-07-30 PROCEDURE — 99204 OFFICE O/P NEW MOD 45 MIN: CPT | Mod: 25

## 2024-07-30 RX ORDER — GUAIFENESIN 400 MG/1
TABLET ORAL
Refills: 0 | Status: ACTIVE | COMMUNITY

## 2024-07-30 RX ORDER — PREDNISONE 10 MG/1
10 TABLET ORAL
Qty: 42 | Refills: 0 | Status: ACTIVE | COMMUNITY
Start: 2024-07-30 | End: 1900-01-01

## 2024-07-30 RX ORDER — ALBUTEROL 90 MCG
AEROSOL (GRAM) INHALATION
Refills: 0 | Status: ACTIVE | COMMUNITY

## 2024-07-30 NOTE — CONSULT LETTER
[Dear  ___] : Dear  [unfilled], [Consult Letter:] : I had the pleasure of evaluating your patient, [unfilled]. [Please see my note below.] : Please see my note below. [Consult Closing:] : Thank you very much for allowing me to participate in the care of this patient.  If you have any questions, please do not hesitate to contact me. [Sincerely,] : Sincerely, [FreeTextEntry2] : Dr. Sylvester Head [FreeTextEntry3] : Omari Arroyo M.D.  Division of Laryngology | Department of Otolaryngology  [address] [address]

## 2024-07-30 NOTE — ASSESSMENT
[FreeTextEntry1] : Assessment/Plan: #1 Dysphagia #2 Chronic cough #3 Emphysema   After a thorough discussion of our findings today, the patient understands we need to do further workup to determine the potential cause of their dysphagia.  As such I have ordered them for a Modified Barium Swallow (MBS) to be performed by our SLP team.  I would like to follow up with them in clinic following this swallow study to go over the results and next steps.  The patient expressed understanding and agreement with this plan. I will call with results. Will likely continue who swallow therapy.   Currently on Breo once in AM and Spiiva 1 puff in AM and 1 puff in PM.  They have agreed to start my Chronic Cough Protocol. They are to take 30 mg of prednisone daily for a total of two weeks.  Should they fail the Chronic Cough Protocol and still have a cough after 2 weeks of prednisone they are to call or message my office to update me.  At that point we would start them on gabapentin, and schedule a follow up with me.    The patient expressed understanding with this plan and received a handout explaining it in detail.  The risks, benefits, and alternatives were discussed.  Patient will reach out with any questions.

## 2024-07-30 NOTE — PHYSICAL EXAM
[Normal] : mucosa is normal [Midline] : trachea located in midline position Patient requests all Lab, Cardiology, and Radiology Results on their Discharge Instructions

## 2024-07-30 NOTE — PROCEDURE
[de-identified] : Procedure: Transnasal flexible laryngoscopy  Description: Informed consent was obtained from the patient prior to the procedure. The patient was seated in the clinic chair. Topical anesthesia was achieved by first spraying the nasal cavities with 4% lidocaine and 1% phenylephrine.   Exam: This demonstrates a clear vallecula and crisp epiglottis. The aryepiglottic folds are intact and symmetric bilaterally. The hypopharynx does not demonstrate pooling. The interarytenoid space demonstrates no lesions. It does not demonstrate pachydermia. The false vocal folds are symmetric and without lesions or masses. False fold voicing (plica ventricularis) is not noted today. The true vocal folds show normal and symmetric motion bilaterally. There is no paradoxical motion. The right medial edge is crisp and shows no lesions or masses. The left medial edge is crisp and shows no lesions or masses. The mucosal covering is minimally edematous. There is not erythema present today. There are no obvious vascular ectasias present. The vocal processes do not demonstrate granulomas. The subglottis and proximal trachea is clear and unobstructed to the limits of the examination today.

## 2024-07-30 NOTE — CONSULT LETTER
[Dear  ___] : Dear  [unfilled], [Consult Letter:] : I had the pleasure of evaluating your patient, [unfilled]. [Please see my note below.] : Please see my note below. [Consult Closing:] : Thank you very much for allowing me to participate in the care of this patient.  If you have any questions, please do not hesitate to contact me. [Sincerely,] : Sincerely, [FreeTextEntry2] : Dr. Sylvester Head [FreeTextEntry3] : Omari Arroyo M.D.  Division of Laryngology | Department of Otolaryngology  [address] [address]

## 2024-07-30 NOTE — HISTORY OF PRESENT ILLNESS
[de-identified] : DELTA WADE  is a 79 year old woman who presents to the [facility] with a chief complaint of evaluation of a chronic cough. He is referred by PCP Dr. Head. They note a cough since having a stroke back in 2014 and steadily been getting worse.  Specific triggers for the cough include: Solid food and liquids (nectar thick consistency) They do note severe paroxysms of cough. They do note coughing as they lie down for bed. They do note being awakened from sleep by this cough frequently. They do note specific difficulties with swallowing. They do NOT note changes in their voice. They do NOT note problems with breathing. They do NOT note frequent classic heartburn symptoms. They are NOT currently smoking.  They do NOT note an exposure to someone with Tuberculosis. They do NOT note unintentional weight loss in the past 3 months. They do NOT chills/night sweats in the last month. They have tried the following meds in an attempt to treat the cough: Guaifenesin, inhalers, Flonase, Saline spray They are NOT maintained on an ACE inhibitor. 15 pack year smoking hx quit 2006.  PMH significant for emphysema.   PREVIOUS STUDIES: CXR done at [facility] June 2024  Ct chest performed on 4/25/24 and reviewed by my shows: 1. Emphysema. 2. Bilateral noncalcified pulmonary nodules the largest of which measure 4 mm. 3. In particular, a 4 mm left lower lobe nodule is new and in the right upper lobe are new groundglass opacities that measure less than 6 mm. 4. Lung RADS 5. Recommendation: CT chest without contrast in 12 months.  Has outside PFTs

## 2024-07-30 NOTE — PROCEDURE
[de-identified] : Procedure: Transnasal flexible laryngoscopy  Description: Informed consent was obtained from the patient prior to the procedure. The patient was seated in the clinic chair. Topical anesthesia was achieved by first spraying the nasal cavities with 4% lidocaine and 1% phenylephrine.   Exam: This demonstrates a clear vallecula and crisp epiglottis. The aryepiglottic folds are intact and symmetric bilaterally. The hypopharynx does not demonstrate pooling. The interarytenoid space demonstrates no lesions. It does not demonstrate pachydermia. The false vocal folds are symmetric and without lesions or masses. False fold voicing (plica ventricularis) is not noted today. The true vocal folds show normal and symmetric motion bilaterally. There is no paradoxical motion. The right medial edge is crisp and shows no lesions or masses. The left medial edge is crisp and shows no lesions or masses. The mucosal covering is minimally edematous. There is not erythema present today. There are no obvious vascular ectasias present. The vocal processes do not demonstrate granulomas. The subglottis and proximal trachea is clear and unobstructed to the limits of the examination today.

## 2024-07-30 NOTE — HISTORY OF PRESENT ILLNESS
[de-identified] : DELTA WADE  is a 79 year old woman who presents to the [facility] with a chief complaint of evaluation of a chronic cough. He is referred by PCP Dr. Head. They note a cough since having a stroke back in 2014 and steadily been getting worse.  Specific triggers for the cough include: Solid food and liquids (nectar thick consistency) They do note severe paroxysms of cough. They do note coughing as they lie down for bed. They do note being awakened from sleep by this cough frequently. They do note specific difficulties with swallowing. They do NOT note changes in their voice. They do NOT note problems with breathing. They do NOT note frequent classic heartburn symptoms. They are NOT currently smoking.  They do NOT note an exposure to someone with Tuberculosis. They do NOT note unintentional weight loss in the past 3 months. They do NOT chills/night sweats in the last month. They have tried the following meds in an attempt to treat the cough: Guaifenesin, inhalers, Flonase, Saline spray They are NOT maintained on an ACE inhibitor. 15 pack year smoking hx quit 2006.  PMH significant for emphysema.   PREVIOUS STUDIES: CXR done at [facility] June 2024  Ct chest performed on 4/25/24 and reviewed by my shows: 1. Emphysema. 2. Bilateral noncalcified pulmonary nodules the largest of which measure 4 mm. 3. In particular, a 4 mm left lower lobe nodule is new and in the right upper lobe are new groundglass opacities that measure less than 6 mm. 4. Lung RADS 5. Recommendation: CT chest without contrast in 12 months.  Has outside PFTs

## 2024-07-31 ENCOUNTER — OUTPATIENT (OUTPATIENT)
Dept: OUTPATIENT SERVICES | Facility: HOSPITAL | Age: 79
LOS: 1 days | End: 2024-07-31
Payer: MEDICARE

## 2024-07-31 ENCOUNTER — APPOINTMENT (OUTPATIENT)
Dept: CT IMAGING | Facility: IMAGING CENTER | Age: 79
End: 2024-07-31
Payer: MEDICARE

## 2024-07-31 ENCOUNTER — TRANSCRIPTION ENCOUNTER (OUTPATIENT)
Age: 79
End: 2024-07-31

## 2024-07-31 DIAGNOSIS — R91.8 OTHER NONSPECIFIC ABNORMAL FINDING OF LUNG FIELD: ICD-10-CM

## 2024-07-31 DIAGNOSIS — Z00.8 ENCOUNTER FOR OTHER GENERAL EXAMINATION: ICD-10-CM

## 2024-07-31 PROCEDURE — 71250 CT THORAX DX C-: CPT

## 2024-07-31 PROCEDURE — 71250 CT THORAX DX C-: CPT | Mod: 26

## 2024-08-05 ENCOUNTER — APPOINTMENT (OUTPATIENT)
Dept: PHYSICAL MEDICINE AND REHAB | Facility: CLINIC | Age: 79
End: 2024-08-05

## 2024-08-05 PROCEDURE — 64642 CHEMODENERV 1 EXTREMITY 1-4: CPT

## 2024-08-05 PROCEDURE — 95874 GUIDE NERV DESTR NEEDLE EMG: CPT

## 2024-08-05 NOTE — ASSESSMENT
[FreeTextEntry1] : Patient is a 79-year-old female history left spastic hemiparesis/aphasia who underwent a Botox injection to the muscles listed above. Tolerated well.  Lumbrical dose was increased.  Will assess elbow flexion tone and pronator tone next visit. Yes

## 2024-08-05 NOTE — PROCEDURE
[Consent] : consent was given by patient or guardian [Site Verification] : the injection site was verified [Post-Injection Instructions Provided] : post-injection instructions were provided [Total Units: ___] : [unfilled] units [Total Vials: ___] : [unfilled] vials were used [Total Waste: ___] : with [unfilled] wasted [de-identified] : Procedural note for Botox injection:  Under sterile conditions the following muscles were injected with Botox in a 2 mL dilution with EMG guidance:  Left FDS --------------------------------90 units (3 sites) Left FDP --------------------------------40 units (2 sites) Left lumbricals-------------------------- 50 units (12.5 units x4)  Total ---------------------------------------180 units

## 2024-08-13 ENCOUNTER — APPOINTMENT (OUTPATIENT)
Dept: OTOLARYNGOLOGY | Facility: CLINIC | Age: 79
End: 2024-08-13
Payer: MEDICARE

## 2024-08-13 DIAGNOSIS — R13.10 DYSPHAGIA, UNSPECIFIED: ICD-10-CM

## 2024-08-13 DIAGNOSIS — R05.3 CHRONIC COUGH: ICD-10-CM

## 2024-08-13 PROCEDURE — 99212 OFFICE O/P EST SF 10 MIN: CPT

## 2024-08-13 RX ORDER — BECLOMETHASONE DIPROPIONATE HFA 80 UG/1
80 AEROSOL, METERED RESPIRATORY (INHALATION) TWICE DAILY
Qty: 1 | Refills: 5 | Status: ACTIVE | COMMUNITY
Start: 2024-08-13 | End: 1900-01-01

## 2024-08-13 NOTE — ASSESSMENT
[FreeTextEntry1] : Assessment/Plan: #1 Dysphagia #2 Chronic cough #3 Emphysema  Should add 1 puff of inhaled steroid I prescribed to AM and continue Breo once in AM and Spiiva 1 puff in AM and 1 puff in PM. Should follow up with me after MBS on 8/28/24.  This appointment was conducted via real-time audio technology from [facility] to the patient in their home. I spent a combined total of 15 minutes on this patient's care today, including face-to-face and non face-to-face time.

## 2024-08-13 NOTE — HISTORY OF PRESENT ILLNESS
[de-identified] : DELTA WADE is a 79 year old female who presents for follow up of her chronic cough, dysphagia, and emphysema.  I last saw the patient on 7/30/24.  At that time started on my chronic cough protocol and ordered for MBS. MBS scheduled for 8/28/24. States her cough is significantly better.   states she is chocking on saliva   Previously reported: chronic cough. He is referred by PCP Dr. Head. They note a cough since having a stroke back in 2014 and steadily been getting worse. Specific triggers for the cough include: Solid food and liquids (nectar thick consistency) They do note severe paroxysms of cough. They do note coughing as they lie down for bed. They do note being awakened from sleep by this cough frequently. They do note specific difficulties with swallowing. They do NOT note changes in their voice. They do NOT note problems with breathing. They do NOT note frequent classic heartburn symptoms. They are NOT currently smoking. They do NOT note an exposure to someone with Tuberculosis. They do NOT note unintentional weight loss in the past 3 months. They do NOT chills/night sweats in the last month. They have tried the following meds in an attempt to treat the cough: Guaifenesin, inhalers, Flonase, Saline spray They are NOT maintained on an ACE inhibitor. 15 pack year smoking hx quit 2006. PMH significant for emphysema.  PREVIOUS STUDIES: CXR done at [facility] June 2024  Ct chest performed on 4/25/24 and reviewed by my shows: 1. Emphysema. 2. Bilateral noncalcified pulmonary nodules the largest of which measure 4 mm. 3. In particular, a 4 mm left lower lobe nodule is new and in the right upper lobe are new groundglass opacities that measure less than 6 mm. 4. Lung RADS 5. Recommendation: CT chest without contrast in 12 months.  Has outside PFTs

## 2024-08-27 RX ORDER — ONABOTULINUMTOXINA 100 [USP'U]/1
100 INJECTION, POWDER, LYOPHILIZED, FOR SOLUTION INTRADERMAL; INTRAMUSCULAR
Qty: 1 | Refills: 0 | Status: COMPLETED | OUTPATIENT
Start: 2024-08-27

## 2024-08-27 RX ORDER — SODIUM CHLORIDE 9 MG/ML
0.9 INJECTION, SOLUTION INTRAMUSCULAR; INTRAVENOUS; SUBCUTANEOUS
Refills: 0 | Status: COMPLETED | OUTPATIENT
Start: 2024-08-27

## 2024-08-28 ENCOUNTER — OUTPATIENT (OUTPATIENT)
Dept: OUTPATIENT SERVICES | Facility: HOSPITAL | Age: 79
LOS: 1 days | Discharge: HOME | End: 2024-08-28

## 2024-08-28 ENCOUNTER — APPOINTMENT (OUTPATIENT)
Dept: SPEECH THERAPY | Facility: HOSPITAL | Age: 79
End: 2024-08-28
Payer: MEDICARE

## 2024-08-28 ENCOUNTER — APPOINTMENT (OUTPATIENT)
Dept: RADIOLOGY | Facility: HOSPITAL | Age: 79
End: 2024-08-28
Payer: MEDICARE

## 2024-08-28 ENCOUNTER — OUTPATIENT (OUTPATIENT)
Dept: OUTPATIENT SERVICES | Facility: HOSPITAL | Age: 79
LOS: 1 days | End: 2024-08-28

## 2024-08-28 DIAGNOSIS — R13.10 DYSPHAGIA, UNSPECIFIED: ICD-10-CM

## 2024-08-28 PROCEDURE — 74230 X-RAY XM SWLNG FUNCJ C+: CPT | Mod: 26

## 2024-08-29 NOTE — HISTORY OF PRESENT ILLNESS
[FreeTextEntry1] : Patient arrived to Radiology for an OutPatient Modified Barium Swallow Study. Patient was accompanied by her  (Sergio). Patient arrived via wheelchair status and utilizes a cane for walking/balance. 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 (433.21) (I63.111) CMC DJD(carpometacarpal degenerative joint disease), localized primary, left (715.14) (M19.032) Depression (311) (F32.A) Dysphagia (787.20) (R13.10) Dysphagia, unspecified type (787.20) (R13.10) Encounter for screening for lung cancer (V76.0) (Z12.2) Gait abnormality (781.2) (R26.9) Genu recurvatum, 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) Hand pain, left (729.5) (M79.642) Hematoma (924.9) (T14.8XXA) Hemiplegic gait (781.2) (R26.1) History of atrial fibrillation (V12.59) (Z86.79) Left wrist pain (719.43) (M25.532) Refractory chronic cough (786.2) (R05.3) Spastic hemiplegia affecting left nondominant side (342.12) (G81.14) Spasticity (781.0) (R25.2) Urinary retention (788.20) (R33.9)     Past Medical History History of atrial fibrillation (V12.59) (Z86.79) History of chronic obstructive lung disease (V12.69) (Z87.09) History of stroke (V12.54) (Z86.73)   ENT Note 8/13/2024 - 79 year old female who presents for follow up of her chronic cough, dysphagia, and emphysema. I last saw the patient on 7/30/24. At that time started on my chronic cough protocol and ordered for MBS. MBS scheduled for 8/28/24. States her cough is significantly better.  states she is chocking on saliva Previously reported: chronic cough. He is referred by PCP Dr. Head. They note a cough since having a stroke back in 2014 and steadily been getting worse. Specific triggers for the cough include: Solid food and liquids (nectar thick consistency) They do note severe paroxysms of cough. They do note coughing as they lie down for bed. They do note being awakened from sleep by this cough frequently. They do note specific difficulties with swallowing. They do NOT note changes in their voice. They do NOT note problems with breathing. They do NOT note frequent classic heartburn symptoms. They are NOT currently smoking. They do NOT note an exposure to someone with Tuberculosis. They do NOT note unintentional weight loss in the past 3 months. They do NOT chills/night sweats in the last month. They have tried the following meds in an attempt to treat the cough: Guaifenesin, inhalers, Flonase, Saline spray They are NOT maintained on an ACE inhibitor. 15 pack year smoking hx quit 2006. PMH significant for emphysema. PREVIOUS STUDIES: CXR done at [facility] June 2024   Today, Patient's  reports baseline diet at home is "Regular cut up into small pieces with [place] Thick Liquids" since stroke in November 2014. Patient utilizes a "sippy cup" no/avoid straws. Patient's  reports "coughing with food and liquid" Patient's  reports having a Private Speech Therapist to come to the home for Speech Therapy but recently is taking a break but plans to reinitiate. Patient's  also reports his wife having had multiple previous swallow studies in the past (last one performed Pre-Covid Pandemic). Patient's  also reports baseline expressive aphasia as Patient is with better auditory comprehension then expressive ability and may not always be accurate with yes/no questions. No current/repeated pneumonia. Patient follows a Pulmonologist for medical management.  This Modified Barium Swallow Study is to objectively assess the Physiology of the Oral and Pharyngeal Stage swallowing mechanism for treatment plan for least restrictive diet.    Referring MD: Dr. Omari Arroyo        
[FreeTextEntry1] : Patient arrived to Radiology for an OutPatient Modified Barium Swallow Study. Patient was accompanied by her  (Serigo). Patient arrived via wheelchair status and utilizes a cane for walking/balance. 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 (433.21) (I63.111) CMC DJD(carpometacarpal degenerative joint disease), localized primary, left (715.14) (M19.032) Depression (311) (F32.A) Dysphagia (787.20) (R13.10) Dysphagia, unspecified type (787.20) (R13.10) Encounter for screening for lung cancer (V76.0) (Z12.2) Gait abnormality (781.2) (R26.9) Genu recurvatum, 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) Hand pain, left (729.5) (M79.642) Hematoma (924.9) (T14.8XXA) Hemiplegic gait (781.2) (R26.1) History of atrial fibrillation (V12.59) (Z86.79) Left wrist pain (719.43) (M25.532) Refractory chronic cough (786.2) (R05.3) Spastic hemiplegia affecting left nondominant side (342.12) (G81.14) Spasticity (781.0) (R25.2) Urinary retention (788.20) (R33.9)     Past Medical History History of atrial fibrillation (V12.59) (Z86.79) History of chronic obstructive lung disease (V12.69) (Z87.09) History of stroke (V12.54) (Z86.73)   ENT Note 8/13/2024 - 79 year old female who presents for follow up of her chronic cough, dysphagia, and emphysema. I last saw the patient on 7/30/24. At that time started on my chronic cough protocol and ordered for MBS. MBS scheduled for 8/28/24. States her cough is significantly better.  states she is chocking on saliva Previously reported: chronic cough. He is referred by PCP Dr. Head. They note a cough since having a stroke back in 2014 and steadily been getting worse. Specific triggers for the cough include: Solid food and liquids (nectar thick consistency) They do note severe paroxysms of cough. They do note coughing as they lie down for bed. They do note being awakened from sleep by this cough frequently. They do note specific difficulties with swallowing. They do NOT note changes in their voice. They do NOT note problems with breathing. They do NOT note frequent classic heartburn symptoms. They are NOT currently smoking. They do NOT note an exposure to someone with Tuberculosis. They do NOT note unintentional weight loss in the past 3 months. They do NOT chills/night sweats in the last month. They have tried the following meds in an attempt to treat the cough: Guaifenesin, inhalers, Flonase, Saline spray They are NOT maintained on an ACE inhibitor. 15 pack year smoking hx quit 2006. PMH significant for emphysema. PREVIOUS STUDIES: CXR done at [facility] June 2024   Today, Patient's  reports baseline diet at home is "Regular cut up into small pieces with [place] Thick Liquids" since stroke in November 2014. Patient utilizes a "sippy cup" no/avoid straws. Patient's  reports "coughing with food and liquid" Patient's  reports having a Private Speech Therapist to come to the home for Speech Therapy but recently is taking a break but plans to reinitiate. Patient's  also reports his wife having had multiple previous swallow studies in the past (last one performed Pre-Covid Pandemic). Patient's  also reports baseline expressive aphasia as Patient is with better auditory comprehension then expressive ability and may not always be accurate with yes/no questions. No current/repeated pneumonia. Patient follows a Pulmonologist for medical management.  This Modified Barium Swallow Study is to objectively assess the Physiology of the Oral and Pharyngeal Stage swallowing mechanism for treatment plan for least restrictive diet.    Referring MD: Dr. Omari Arroyo        
Carolynn Mcleod

## 2024-08-29 NOTE — PLAN
[FreeTextEntry2] :  1.) Soft Bite Size with Mildly Thick Liquids (Nectar Thick Liquids) 2.) Feeding/Swallowing Guidelines: Upright position, encourage/provide small bites, encourage/allow patient to chew and swallow prior to next presentation, encourage/provide small single cup sip of mildly thick liquids. 3.) Aspiration Precautions 4.) Reflux Precautions 5.) Maintain Good Oral Hygiene Care 6.) Follow up with Physician 7.) Follow up with Treating Speech Pathologist  SLP provided Patient/ education regarding preliminary results. Patient's  verbalized understanding and will follow up with Physician and Treating Speech Pathologist.

## 2024-08-29 NOTE — ASSESSMENT
[FreeTextEntry1] : Patient presents with Mild Oral Stage and Moderate Pharyngeal Stage Dysphagia. The Oral Stage is characterized by adequate oral containment, slow/prolonged chewing for soft bite size solid/solids, slow bolus manipulation, slow tongue motion with slow anterior to posterior transfer of the bolus for soft bite size solid/puree; piecemeal transfer/deglutition for soft bite size solids; trace premature spillage/loss to the hypopharynx for Thin Liquids and Mildly Thick Liquids due to reduced tongue to palate seal; with adequate oral clearance post swallow.  The Pharyngeal Stage is characterized by delayed initiation of the pharyngeal swallow (Bolus head is at the vallecular for Thin Liquids), reduced laryngeal elevation with incomplete laryngeal vestibular closure, adequate tongue base retraction and adequate pharyngeal constriction. There is adequate pharyngeal clearance post swallow for puree/soft bite size solids. There was Deep Laryngeal Penetration during the swallow for Thin Liquids via Teaspoon presentation without retrieval migrating down to the level of the vocal folds. Patient is sensate to the Deep Penetration given a cough response.  There was No Aspiration observed before, during or after the swallow for puree, soft bite size, moderately thick liquids and mildly thick liquids.   RESULTS:  1.) No Aspiration observed before, during or after the swallow for puree, soft bite size, moderately thick liquids and mildly thick liquids. 2.)  Deep Laryngeal Penetration during the swallow for Thin Liquids via Teaspoon presentation without retrieval migrating down to the level of the vocal folds. Patient is sensate to the Deep Penetration given a cough response.  Of Note: An Esophageal Screen could not be performed. Patient is unable to stand independently due to concerns for fall/balance.

## 2024-09-11 DIAGNOSIS — R13.12 DYSPHAGIA, OROPHARYNGEAL PHASE: ICD-10-CM

## 2024-09-19 ENCOUNTER — APPOINTMENT (OUTPATIENT)
Dept: PHYSICAL MEDICINE AND REHAB | Facility: CLINIC | Age: 79
End: 2024-09-19
Payer: MEDICARE

## 2024-09-19 VITALS
RESPIRATION RATE: 14 BRPM | DIASTOLIC BLOOD PRESSURE: 76 MMHG | HEART RATE: 86 BPM | TEMPERATURE: 97.8 F | OXYGEN SATURATION: 96 % | SYSTOLIC BLOOD PRESSURE: 125 MMHG

## 2024-09-19 DIAGNOSIS — M21.862 OTHER SPECIFIED ACQUIRED DEFORMITIES OF LEFT LOWER LEG: ICD-10-CM

## 2024-09-19 DIAGNOSIS — G81.14 SPASTIC HEMIPLEGIA AFFECTING LEFT NONDOMINANT SIDE: ICD-10-CM

## 2024-09-19 DIAGNOSIS — R26.1 PARALYTIC GAIT: ICD-10-CM

## 2024-09-19 PROCEDURE — 99213 OFFICE O/P EST LOW 20 MIN: CPT

## 2024-09-19 RX ORDER — ONABOTULINUMTOXINA 100 [USP'U]/1
100 INJECTION, POWDER, LYOPHILIZED, FOR SOLUTION INTRADERMAL; INTRAMUSCULAR
Qty: 2 | Refills: 0 | Status: ACTIVE | OUTPATIENT
Start: 2024-09-19

## 2024-09-19 NOTE — PHYSICAL EXAM
[FreeTextEntry1] : General: Well developed female in no apparent distress. Patient is awake, alert, follows two-step commands. Cooperative.  Motor: Left upper extremity: Synergistic movement of the shoulder flexors/elbow flexors <3/5 motor power. No active in movement at hand or wrist Tone: Left elbow flexors MAS = 0-1 Left elbow extensors MAS = 2+ to 3, passive elbow flexion to 95 degrees. Left pronators MAS = 2, full passive supination.  No pain Left wrist flexors MAS = 1, passive extension to 70-80 deg. Maintains wrist in ~10 extension.  Left FDS MAS= 0 with wrist flexed, MAS= 0-1  with the wrist extended. Left FDP MAS = 0 with a wrist flexed, MAS = 0 with a wrist extended. Left FPL MAS = 0 with a wrist flexed, MAS = 1+ with wrist extended. Left MCP flexors MAS = 0, patient with MCP flexion contracturee at digit 3, (-)30 deg from neutral.   Left lower extremity: Able to passively dorsiflexed ankle 5-10 above neutral with the knee flexed and extended..  Functional status: Patient ambulated with left hinged AFO with shallow heel strike, mild genu recurvatum with use of a left heel lift-3/8", right 5/8 inch heel lift. (+) left uncompensated gluteus medius.  Patient noted to be ambulating with her right knee in flexed posture.

## 2024-09-19 NOTE — REVIEW OF SYSTEMS
[Muscle Weakness] : muscle weakness [Difficulty Walking] : difficulty walking [Negative] : Gastrointestinal [Fever] : no fever [Chills] : no chills [Joint Pain] : no joint pain [Skin Rash] : no skin rash [Skin Wound] : no skin wound

## 2024-09-19 NOTE — HISTORY OF PRESENT ILLNESS
[FreeTextEntry1] : Patient is a 79-year-old female history of left spastic hemiparesis/aphasia who underwent a Botox injection on Aug. 5, 2024. Patient's spouse reports excellent tone reduction at the fingers with improved ease of stretching. No hygiene issues.  No pain with stretching.  Patient has a hand roll that she tolerates well, in fact her hand is so loose that they have held using the hand roll.   reports not obtaining the right outer sole lift and has been using a right heel lift of 5/8 of an inch which she does report has improved the clearance of her left lower extremity.  No falls reported. Patient ambulates with SAC/AFO with supervision.

## 2024-09-19 NOTE — ASSESSMENT
[FreeTextEntry1] : Patient is a 79-year-old female history of left spastic hemiparesis/aphasia with excellent tone reduction after Botox injection on Aug. 5, 2024.  Will continue the Botox injection protocol of August 5, 2024 (2 vials).  Patient with some increased tone noted at the left triceps and elbow flexion limited to approximately 95 degrees passively.  Patient's  reports that this has been present for many years and will not include the triceps muscles for now.  Discussed with patient's  that extra height on the right lower extremity would be helpful to clear her left lower extremity especially since she ambulates with a right knee in a flexed posture.  Patient's current right 5/8 inch heel lift is somewhat soft.  Recommend a firmer heel lift, website provided.  I spent a total of 20 minutes on the date of encounter evaluating treating the patient including a review of the x-ray results and discussion of treatment options.

## 2024-10-01 ENCOUNTER — APPOINTMENT (OUTPATIENT)
Dept: OTOLARYNGOLOGY | Facility: CLINIC | Age: 79
End: 2024-10-01

## 2024-10-14 ENCOUNTER — APPOINTMENT (OUTPATIENT)
Dept: OTOLARYNGOLOGY | Facility: CLINIC | Age: 79
End: 2024-10-14
Payer: MEDICARE

## 2024-10-14 VITALS — BODY MASS INDEX: 21.6 KG/M2 | HEIGHT: 60 IN | WEIGHT: 110 LBS

## 2024-10-14 DIAGNOSIS — R47.01 APHASIA: ICD-10-CM

## 2024-10-14 DIAGNOSIS — R05.3 CHRONIC COUGH: ICD-10-CM

## 2024-10-14 DIAGNOSIS — I63.411 CEREBRAL INFARCTION DUE TO EMBOLISM OF RIGHT MIDDLE CEREBRAL ARTERY: ICD-10-CM

## 2024-10-14 DIAGNOSIS — R13.10 DYSPHAGIA, UNSPECIFIED: ICD-10-CM

## 2024-10-14 PROCEDURE — 99213 OFFICE O/P EST LOW 20 MIN: CPT

## 2024-10-14 RX ORDER — RISPERIDONE 0.5 MG/1
TABLET, FILM COATED ORAL
Refills: 0 | Status: ACTIVE | COMMUNITY

## 2024-10-19 ENCOUNTER — APPOINTMENT (OUTPATIENT)
Dept: MRI IMAGING | Facility: CLINIC | Age: 79
End: 2024-10-19

## 2024-10-19 ENCOUNTER — OUTPATIENT (OUTPATIENT)
Dept: OUTPATIENT SERVICES | Facility: HOSPITAL | Age: 79
LOS: 1 days | End: 2024-10-19
Payer: MEDICARE

## 2024-10-19 DIAGNOSIS — Z00.8 ENCOUNTER FOR OTHER GENERAL EXAMINATION: ICD-10-CM

## 2024-10-19 PROCEDURE — 70544 MR ANGIOGRAPHY HEAD W/O DYE: CPT | Mod: 26,XU

## 2024-10-19 PROCEDURE — 70551 MRI BRAIN STEM W/O DYE: CPT | Mod: 26

## 2024-10-19 PROCEDURE — 70544 MR ANGIOGRAPHY HEAD W/O DYE: CPT

## 2024-10-19 PROCEDURE — 70551 MRI BRAIN STEM W/O DYE: CPT

## 2024-10-28 ENCOUNTER — APPOINTMENT (OUTPATIENT)
Dept: OTOLARYNGOLOGY | Facility: CLINIC | Age: 79
End: 2024-10-28
Payer: MEDICARE

## 2024-10-28 PROCEDURE — 92610 EVALUATE SWALLOWING FUNCTION: CPT | Mod: GN

## 2024-11-06 ENCOUNTER — APPOINTMENT (OUTPATIENT)
Dept: OTOLARYNGOLOGY | Facility: CLINIC | Age: 79
End: 2024-11-06

## 2024-11-15 ENCOUNTER — APPOINTMENT (OUTPATIENT)
Dept: OTOLARYNGOLOGY | Facility: CLINIC | Age: 79
End: 2024-11-15
Payer: MEDICARE

## 2024-11-15 PROCEDURE — 92526 ORAL FUNCTION THERAPY: CPT | Mod: GN

## 2024-11-21 ENCOUNTER — APPOINTMENT (OUTPATIENT)
Dept: OTOLARYNGOLOGY | Facility: CLINIC | Age: 79
End: 2024-11-21
Payer: MEDICARE

## 2024-11-21 PROCEDURE — 92526 ORAL FUNCTION THERAPY: CPT | Mod: GN

## 2024-11-27 ENCOUNTER — APPOINTMENT (OUTPATIENT)
Dept: OTOLARYNGOLOGY | Facility: CLINIC | Age: 79
End: 2024-11-27
Payer: MEDICARE

## 2024-11-27 PROCEDURE — 92526 ORAL FUNCTION THERAPY: CPT | Mod: GN

## 2024-12-04 ENCOUNTER — APPOINTMENT (OUTPATIENT)
Dept: OTOLARYNGOLOGY | Facility: CLINIC | Age: 79
End: 2024-12-04
Payer: MEDICARE

## 2024-12-04 PROCEDURE — 92526 ORAL FUNCTION THERAPY: CPT | Mod: GN

## 2024-12-11 ENCOUNTER — APPOINTMENT (OUTPATIENT)
Dept: OTOLARYNGOLOGY | Facility: CLINIC | Age: 79
End: 2024-12-11

## 2024-12-18 ENCOUNTER — APPOINTMENT (OUTPATIENT)
Dept: OTOLARYNGOLOGY | Facility: CLINIC | Age: 79
End: 2024-12-18
Payer: MEDICARE

## 2024-12-18 PROCEDURE — 92526 ORAL FUNCTION THERAPY: CPT | Mod: GN

## 2024-12-23 ENCOUNTER — APPOINTMENT (OUTPATIENT)
Dept: OTOLARYNGOLOGY | Facility: CLINIC | Age: 79
End: 2024-12-23
Payer: MEDICARE

## 2024-12-23 PROCEDURE — 92526 ORAL FUNCTION THERAPY: CPT | Mod: GN

## 2025-01-02 ENCOUNTER — APPOINTMENT (OUTPATIENT)
Dept: OTOLARYNGOLOGY | Facility: CLINIC | Age: 80
End: 2025-01-02
Payer: MEDICARE

## 2025-01-02 PROCEDURE — 92526 ORAL FUNCTION THERAPY: CPT | Mod: GN

## 2025-01-08 ENCOUNTER — APPOINTMENT (OUTPATIENT)
Dept: OTOLARYNGOLOGY | Facility: CLINIC | Age: 80
End: 2025-01-08
Payer: MEDICARE

## 2025-01-08 PROCEDURE — 92526 ORAL FUNCTION THERAPY: CPT | Mod: GN

## 2025-01-16 ENCOUNTER — APPOINTMENT (OUTPATIENT)
Dept: OTOLARYNGOLOGY | Facility: CLINIC | Age: 80
End: 2025-01-16
Payer: MEDICARE

## 2025-01-16 PROCEDURE — 92526 ORAL FUNCTION THERAPY: CPT | Mod: GN

## 2025-01-30 ENCOUNTER — APPOINTMENT (OUTPATIENT)
Dept: OTOLARYNGOLOGY | Facility: CLINIC | Age: 80
End: 2025-01-30
Payer: MEDICARE

## 2025-01-30 PROCEDURE — 92526 ORAL FUNCTION THERAPY: CPT | Mod: GN

## 2025-02-13 ENCOUNTER — APPOINTMENT (OUTPATIENT)
Dept: OTOLARYNGOLOGY | Facility: CLINIC | Age: 80
End: 2025-02-13

## 2025-03-05 ENCOUNTER — APPOINTMENT (OUTPATIENT)
Dept: PHYSICAL MEDICINE AND REHAB | Facility: CLINIC | Age: 80
End: 2025-03-05
Payer: MEDICARE

## 2025-03-05 DIAGNOSIS — G81.14 SPASTIC HEMIPLEGIA AFFECTING LEFT NONDOMINANT SIDE: ICD-10-CM

## 2025-03-05 PROCEDURE — 95874 GUIDE NERV DESTR NEEDLE EMG: CPT

## 2025-03-05 PROCEDURE — 64642 CHEMODENERV 1 EXTREMITY 1-4: CPT

## 2025-03-05 RX ORDER — ONABOTULINUMTOXINA 100 [USP'U]/1
100 INJECTION, POWDER, LYOPHILIZED, FOR SOLUTION INTRADERMAL; INTRAMUSCULAR
Qty: 1 | Refills: 0 | Status: COMPLETED | OUTPATIENT
Start: 2025-03-05

## 2025-03-05 RX ADMIN — ONABOTULINUMTOXINA 0 UNIT: 100 INJECTION, POWDER, LYOPHILIZED, FOR SOLUTION INTRADERMAL; INTRAMUSCULAR at 00:00

## 2025-03-17 ENCOUNTER — NON-APPOINTMENT (OUTPATIENT)
Age: 80
End: 2025-03-17

## 2025-04-29 ENCOUNTER — APPOINTMENT (OUTPATIENT)
Dept: OTOLARYNGOLOGY | Facility: CLINIC | Age: 80
End: 2025-04-29
Payer: MEDICARE

## 2025-04-29 VITALS
HEIGHT: 60 IN | BODY MASS INDEX: 21.6 KG/M2 | DIASTOLIC BLOOD PRESSURE: 64 MMHG | WEIGHT: 110 LBS | HEART RATE: 77 BPM | SYSTOLIC BLOOD PRESSURE: 66 MMHG

## 2025-04-29 DIAGNOSIS — R05.3 CHRONIC COUGH: ICD-10-CM

## 2025-04-29 DIAGNOSIS — R13.10 DYSPHAGIA, UNSPECIFIED: ICD-10-CM

## 2025-04-29 PROCEDURE — 99213 OFFICE O/P EST LOW 20 MIN: CPT | Mod: 25

## 2025-04-29 PROCEDURE — 31575 DIAGNOSTIC LARYNGOSCOPY: CPT

## 2025-05-27 NOTE — ED PROCEDURE NOTE - CPROC ED PHYSICIAN PRESENCE1
Detail Level: Simple
Render Risk Assessment In Note?: no
Comment: Mother, “had radiation on something on her face”
Comment: PMH-Actinic keratosis right superior medial malar cheek.-deferred [place]-5-2022: Actinic cheilitis-left inferior vermillion lip-deferred [place] 5-22\\nNER\\n\\nPMH AKs; No Skin CA
I was present during the key portion of the procedure.

## 2025-08-27 ENCOUNTER — APPOINTMENT (OUTPATIENT)
Dept: PHYSICAL MEDICINE AND REHAB | Facility: CLINIC | Age: 80
End: 2025-08-27

## 2025-09-03 ENCOUNTER — NON-APPOINTMENT (OUTPATIENT)
Age: 80
End: 2025-09-03

## 2025-09-04 ENCOUNTER — APPOINTMENT (OUTPATIENT)
Dept: PHYSICAL MEDICINE AND REHAB | Facility: CLINIC | Age: 80
End: 2025-09-04
Payer: MEDICARE

## 2025-09-04 DIAGNOSIS — M21.862 OTHER SPECIFIED ACQUIRED DEFORMITIES OF LEFT LOWER LEG: ICD-10-CM

## 2025-09-04 DIAGNOSIS — G81.14 SPASTIC HEMIPLEGIA AFFECTING LEFT NONDOMINANT SIDE: ICD-10-CM

## 2025-09-04 DIAGNOSIS — R26.1 PARALYTIC GAIT: ICD-10-CM

## 2025-09-04 PROCEDURE — 99214 OFFICE O/P EST MOD 30 MIN: CPT

## 2025-09-04 RX ORDER — ONABOTULINUMTOXINA 100 [USP'U]/1
100 INJECTION, POWDER, LYOPHILIZED, FOR SOLUTION INTRADERMAL; INTRAMUSCULAR
Qty: 2 | Refills: 0 | Status: ACTIVE | OUTPATIENT
Start: 2025-09-04

## 2025-09-16 ENCOUNTER — APPOINTMENT (OUTPATIENT)
Dept: PHYSICAL MEDICINE AND REHAB | Facility: CLINIC | Age: 80
End: 2025-09-16
Payer: MEDICARE

## 2025-09-16 DIAGNOSIS — G81.14 SPASTIC HEMIPLEGIA AFFECTING LEFT NONDOMINANT SIDE: ICD-10-CM

## 2025-09-16 PROCEDURE — 95874 GUIDE NERV DESTR NEEDLE EMG: CPT

## 2025-09-16 PROCEDURE — 64642 CHEMODENERV 1 EXTREMITY 1-4: CPT
